# Patient Record
Sex: MALE | Race: WHITE | NOT HISPANIC OR LATINO | Employment: OTHER | URBAN - METROPOLITAN AREA
[De-identification: names, ages, dates, MRNs, and addresses within clinical notes are randomized per-mention and may not be internally consistent; named-entity substitution may affect disease eponyms.]

---

## 2021-04-07 ENCOUNTER — HOSPITAL ENCOUNTER (INPATIENT)
Facility: HOSPITAL | Age: 56
LOS: 2 days | Discharge: HOME | DRG: 392 | End: 2021-04-09
Attending: EMERGENCY MEDICINE | Admitting: STUDENT IN AN ORGANIZED HEALTH CARE EDUCATION/TRAINING PROGRAM

## 2021-04-07 DIAGNOSIS — K57.92 ACUTE DIVERTICULITIS: ICD-10-CM

## 2021-04-07 DIAGNOSIS — K57.20 DIVERTICULITIS OF LARGE INTESTINE WITH ABSCESS WITHOUT BLEEDING: Primary | ICD-10-CM

## 2021-04-07 DIAGNOSIS — R07.9 CHEST PAIN: ICD-10-CM

## 2021-04-07 DIAGNOSIS — R10.31 RIGHT LOWER QUADRANT ABDOMINAL PAIN: ICD-10-CM

## 2021-04-07 LAB
ALBUMIN SERPL BCP-MCNC: 4 G/DL (ref 3.5–5.2)
ALP SERPL-CCNC: 89 U/L (ref 55–135)
ALT SERPL W/O P-5'-P-CCNC: 20 U/L (ref 10–44)
ANION GAP SERPL CALC-SCNC: 10 MMOL/L (ref 8–16)
AST SERPL-CCNC: 13 U/L (ref 10–40)
BASOPHILS # BLD AUTO: 0.03 K/UL (ref 0–0.2)
BASOPHILS NFR BLD: 0.2 % (ref 0–1.9)
BILIRUB SERPL-MCNC: 0.6 MG/DL (ref 0.1–1)
BILIRUB UR QL STRIP: NEGATIVE
BUN SERPL-MCNC: 9 MG/DL (ref 6–20)
CALCIUM SERPL-MCNC: 9.1 MG/DL (ref 8.7–10.5)
CHLORIDE SERPL-SCNC: 100 MMOL/L (ref 95–110)
CLARITY UR: CLEAR
CO2 SERPL-SCNC: 26 MMOL/L (ref 23–29)
COLOR UR: YELLOW
CREAT SERPL-MCNC: 0.8 MG/DL (ref 0.5–1.4)
DIFFERENTIAL METHOD: ABNORMAL
EOSINOPHIL # BLD AUTO: 0.1 K/UL (ref 0–0.5)
EOSINOPHIL NFR BLD: 0.6 % (ref 0–8)
ERYTHROCYTE [DISTWIDTH] IN BLOOD BY AUTOMATED COUNT: 12.3 % (ref 11.5–14.5)
EST. GFR  (AFRICAN AMERICAN): >60 ML/MIN/1.73 M^2
EST. GFR  (NON AFRICAN AMERICAN): >60 ML/MIN/1.73 M^2
GLUCOSE SERPL-MCNC: 132 MG/DL (ref 70–110)
GLUCOSE UR QL STRIP: NEGATIVE
HCT VFR BLD AUTO: 42 % (ref 40–54)
HGB BLD-MCNC: 14.1 G/DL (ref 14–18)
HGB UR QL STRIP: NEGATIVE
IMM GRANULOCYTES # BLD AUTO: 0.05 K/UL (ref 0–0.04)
IMM GRANULOCYTES NFR BLD AUTO: 0.4 % (ref 0–0.5)
KETONES UR QL STRIP: NEGATIVE
LEUKOCYTE ESTERASE UR QL STRIP: NEGATIVE
LIPASE SERPL-CCNC: 5 U/L (ref 4–60)
LYMPHOCYTES # BLD AUTO: 1.4 K/UL (ref 1–4.8)
LYMPHOCYTES NFR BLD: 10.2 % (ref 18–48)
MCH RBC QN AUTO: 31.4 PG (ref 27–31)
MCHC RBC AUTO-ENTMCNC: 33.6 G/DL (ref 32–36)
MCV RBC AUTO: 94 FL (ref 82–98)
MONOCYTES # BLD AUTO: 1 K/UL (ref 0.3–1)
MONOCYTES NFR BLD: 7.3 % (ref 4–15)
NEUTROPHILS # BLD AUTO: 11 K/UL (ref 1.8–7.7)
NEUTROPHILS NFR BLD: 81.3 % (ref 38–73)
NITRITE UR QL STRIP: NEGATIVE
NRBC BLD-RTO: 0 /100 WBC
PH UR STRIP: 7 [PH] (ref 5–8)
PLATELET # BLD AUTO: 214 K/UL (ref 150–450)
PMV BLD AUTO: 9.4 FL (ref 9.2–12.9)
POTASSIUM SERPL-SCNC: 3.8 MMOL/L (ref 3.5–5.1)
PROT SERPL-MCNC: 6.7 G/DL (ref 6–8.4)
PROT UR QL STRIP: NEGATIVE
RBC # BLD AUTO: 4.49 M/UL (ref 4.6–6.2)
SARS-COV-2 RDRP RESP QL NAA+PROBE: NEGATIVE
SODIUM SERPL-SCNC: 136 MMOL/L (ref 136–145)
SP GR UR STRIP: <=1.005 (ref 1–1.03)
URN SPEC COLLECT METH UR: ABNORMAL
UROBILINOGEN UR STRIP-ACNC: NEGATIVE EU/DL
WBC # BLD AUTO: 13.56 K/UL (ref 3.9–12.7)

## 2021-04-07 PROCEDURE — U0002 COVID-19 LAB TEST NON-CDC: HCPCS | Performed by: EMERGENCY MEDICINE

## 2021-04-07 PROCEDURE — 63600175 PHARM REV CODE 636 W HCPCS: Performed by: EMERGENCY MEDICINE

## 2021-04-07 PROCEDURE — 80053 COMPREHEN METABOLIC PANEL: CPT | Performed by: NURSE PRACTITIONER

## 2021-04-07 PROCEDURE — 83690 ASSAY OF LIPASE: CPT | Performed by: NURSE PRACTITIONER

## 2021-04-07 PROCEDURE — 25500020 PHARM REV CODE 255

## 2021-04-07 PROCEDURE — 96365 THER/PROPH/DIAG IV INF INIT: CPT

## 2021-04-07 PROCEDURE — 63600175 PHARM REV CODE 636 W HCPCS: Performed by: STUDENT IN AN ORGANIZED HEALTH CARE EDUCATION/TRAINING PROGRAM

## 2021-04-07 PROCEDURE — 96375 TX/PRO/DX INJ NEW DRUG ADDON: CPT

## 2021-04-07 PROCEDURE — 36415 COLL VENOUS BLD VENIPUNCTURE: CPT | Performed by: EMERGENCY MEDICINE

## 2021-04-07 PROCEDURE — 85025 COMPLETE CBC W/AUTO DIFF WBC: CPT | Performed by: NURSE PRACTITIONER

## 2021-04-07 PROCEDURE — 12000002 HC ACUTE/MED SURGE SEMI-PRIVATE ROOM

## 2021-04-07 PROCEDURE — 96361 HYDRATE IV INFUSION ADD-ON: CPT

## 2021-04-07 PROCEDURE — 87040 BLOOD CULTURE FOR BACTERIA: CPT | Mod: 59 | Performed by: EMERGENCY MEDICINE

## 2021-04-07 PROCEDURE — 99222 1ST HOSP IP/OBS MODERATE 55: CPT | Mod: ,,, | Performed by: SURGERY

## 2021-04-07 PROCEDURE — 99222 PR INITIAL HOSPITAL CARE,LEVL II: ICD-10-PCS | Mod: ,,, | Performed by: SURGERY

## 2021-04-07 PROCEDURE — 36415 COLL VENOUS BLD VENIPUNCTURE: CPT | Performed by: NURSE PRACTITIONER

## 2021-04-07 PROCEDURE — 25000003 PHARM REV CODE 250: Performed by: EMERGENCY MEDICINE

## 2021-04-07 PROCEDURE — 81003 URINALYSIS AUTO W/O SCOPE: CPT | Performed by: NURSE PRACTITIONER

## 2021-04-07 PROCEDURE — 99285 EMERGENCY DEPT VISIT HI MDM: CPT | Mod: 25

## 2021-04-07 RX ORDER — MORPHINE SULFATE 4 MG/ML
4 INJECTION, SOLUTION INTRAMUSCULAR; INTRAVENOUS EVERY 4 HOURS PRN
Status: DISCONTINUED | OUTPATIENT
Start: 2021-04-07 | End: 2021-04-09 | Stop reason: HOSPADM

## 2021-04-07 RX ORDER — IBUPROFEN 200 MG
16 TABLET ORAL
Status: DISCONTINUED | OUTPATIENT
Start: 2021-04-07 | End: 2021-04-09 | Stop reason: HOSPADM

## 2021-04-07 RX ORDER — SODIUM CHLORIDE, SODIUM LACTATE, POTASSIUM CHLORIDE, CALCIUM CHLORIDE 600; 310; 30; 20 MG/100ML; MG/100ML; MG/100ML; MG/100ML
INJECTION, SOLUTION INTRAVENOUS CONTINUOUS
Status: DISCONTINUED | OUTPATIENT
Start: 2021-04-07 | End: 2021-04-09 | Stop reason: HOSPADM

## 2021-04-07 RX ORDER — GLUCAGON 1 MG
1 KIT INJECTION
Status: DISCONTINUED | OUTPATIENT
Start: 2021-04-07 | End: 2021-04-09 | Stop reason: HOSPADM

## 2021-04-07 RX ORDER — SODIUM CHLORIDE 0.9 % (FLUSH) 0.9 %
10 SYRINGE (ML) INJECTION
Status: DISCONTINUED | OUTPATIENT
Start: 2021-04-07 | End: 2021-04-09 | Stop reason: HOSPADM

## 2021-04-07 RX ORDER — HYDROMORPHONE HYDROCHLORIDE 2 MG/ML
1 INJECTION, SOLUTION INTRAMUSCULAR; INTRAVENOUS; SUBCUTANEOUS
Status: COMPLETED | OUTPATIENT
Start: 2021-04-07 | End: 2021-04-07

## 2021-04-07 RX ORDER — IBUPROFEN 200 MG
24 TABLET ORAL
Status: DISCONTINUED | OUTPATIENT
Start: 2021-04-07 | End: 2021-04-09 | Stop reason: HOSPADM

## 2021-04-07 RX ORDER — ONDANSETRON 2 MG/ML
4 INJECTION INTRAMUSCULAR; INTRAVENOUS EVERY 8 HOURS PRN
Status: DISCONTINUED | OUTPATIENT
Start: 2021-04-07 | End: 2021-04-09 | Stop reason: HOSPADM

## 2021-04-07 RX ORDER — HYDROMORPHONE HYDROCHLORIDE 1 MG/ML
1 INJECTION, SOLUTION INTRAMUSCULAR; INTRAVENOUS; SUBCUTANEOUS EVERY 6 HOURS PRN
Status: DISCONTINUED | OUTPATIENT
Start: 2021-04-07 | End: 2021-04-09 | Stop reason: HOSPADM

## 2021-04-07 RX ORDER — MORPHINE SULFATE 4 MG/ML
4 INJECTION, SOLUTION INTRAMUSCULAR; INTRAVENOUS
Status: COMPLETED | OUTPATIENT
Start: 2021-04-07 | End: 2021-04-07

## 2021-04-07 RX ADMIN — MORPHINE SULFATE 4 MG: 4 INJECTION, SOLUTION INTRAMUSCULAR; INTRAVENOUS at 03:04

## 2021-04-07 RX ADMIN — IOHEXOL 100 ML: 350 INJECTION, SOLUTION INTRAVENOUS at 02:04

## 2021-04-07 RX ADMIN — SODIUM CHLORIDE, SODIUM LACTATE, POTASSIUM CHLORIDE, AND CALCIUM CHLORIDE: .6; .31; .03; .02 INJECTION, SOLUTION INTRAVENOUS at 08:04

## 2021-04-07 RX ADMIN — SODIUM CHLORIDE 1000 ML: 0.9 INJECTION, SOLUTION INTRAVENOUS at 03:04

## 2021-04-07 RX ADMIN — MORPHINE SULFATE 4 MG: 4 INJECTION, SOLUTION INTRAMUSCULAR; INTRAVENOUS at 09:04

## 2021-04-07 RX ADMIN — HYDROMORPHONE HYDROCHLORIDE 1 MG: 2 INJECTION INTRAMUSCULAR; INTRAVENOUS; SUBCUTANEOUS at 05:04

## 2021-04-07 RX ADMIN — PIPERACILLIN AND TAZOBACTAM 4.5 G: 4; .5 INJECTION, POWDER, LYOPHILIZED, FOR SOLUTION INTRAVENOUS; PARENTERAL at 05:04

## 2021-04-08 LAB
ALBUMIN SERPL BCP-MCNC: 3.4 G/DL (ref 3.5–5.2)
ALP SERPL-CCNC: 87 U/L (ref 55–135)
ALT SERPL W/O P-5'-P-CCNC: 18 U/L (ref 10–44)
ANION GAP SERPL CALC-SCNC: 9 MMOL/L (ref 8–16)
AST SERPL-CCNC: 12 U/L (ref 10–40)
BASOPHILS # BLD AUTO: 0.03 K/UL (ref 0–0.2)
BASOPHILS NFR BLD: 0.3 % (ref 0–1.9)
BILIRUB SERPL-MCNC: 0.7 MG/DL (ref 0.1–1)
BUN SERPL-MCNC: 8 MG/DL (ref 6–20)
CALCIUM SERPL-MCNC: 8.7 MG/DL (ref 8.7–10.5)
CHLORIDE SERPL-SCNC: 106 MMOL/L (ref 95–110)
CO2 SERPL-SCNC: 25 MMOL/L (ref 23–29)
CREAT SERPL-MCNC: 0.8 MG/DL (ref 0.5–1.4)
DIFFERENTIAL METHOD: ABNORMAL
EOSINOPHIL # BLD AUTO: 0.1 K/UL (ref 0–0.5)
EOSINOPHIL NFR BLD: 0.9 % (ref 0–8)
ERYTHROCYTE [DISTWIDTH] IN BLOOD BY AUTOMATED COUNT: 12.5 % (ref 11.5–14.5)
EST. GFR  (AFRICAN AMERICAN): >60 ML/MIN/1.73 M^2
EST. GFR  (NON AFRICAN AMERICAN): >60 ML/MIN/1.73 M^2
GLUCOSE SERPL-MCNC: 97 MG/DL (ref 70–110)
HCT VFR BLD AUTO: 39.7 % (ref 40–54)
HGB BLD-MCNC: 13.1 G/DL (ref 14–18)
IMM GRANULOCYTES # BLD AUTO: 0.04 K/UL (ref 0–0.04)
IMM GRANULOCYTES NFR BLD AUTO: 0.4 % (ref 0–0.5)
LYMPHOCYTES # BLD AUTO: 1 K/UL (ref 1–4.8)
LYMPHOCYTES NFR BLD: 9.7 % (ref 18–48)
MAGNESIUM SERPL-MCNC: 2 MG/DL (ref 1.6–2.6)
MCH RBC QN AUTO: 31.2 PG (ref 27–31)
MCHC RBC AUTO-ENTMCNC: 33 G/DL (ref 32–36)
MCV RBC AUTO: 95 FL (ref 82–98)
MONOCYTES # BLD AUTO: 0.9 K/UL (ref 0.3–1)
MONOCYTES NFR BLD: 8.6 % (ref 4–15)
NEUTROPHILS # BLD AUTO: 8.3 K/UL (ref 1.8–7.7)
NEUTROPHILS NFR BLD: 80.1 % (ref 38–73)
NRBC BLD-RTO: 0 /100 WBC
PLATELET # BLD AUTO: 209 K/UL (ref 150–450)
PMV BLD AUTO: 10.2 FL (ref 9.2–12.9)
POTASSIUM SERPL-SCNC: 3.9 MMOL/L (ref 3.5–5.1)
PROT SERPL-MCNC: 6.1 G/DL (ref 6–8.4)
RBC # BLD AUTO: 4.2 M/UL (ref 4.6–6.2)
SODIUM SERPL-SCNC: 140 MMOL/L (ref 136–145)
WBC # BLD AUTO: 10.38 K/UL (ref 3.9–12.7)

## 2021-04-08 PROCEDURE — 85025 COMPLETE CBC W/AUTO DIFF WBC: CPT | Performed by: STUDENT IN AN ORGANIZED HEALTH CARE EDUCATION/TRAINING PROGRAM

## 2021-04-08 PROCEDURE — 63600175 PHARM REV CODE 636 W HCPCS: Performed by: STUDENT IN AN ORGANIZED HEALTH CARE EDUCATION/TRAINING PROGRAM

## 2021-04-08 PROCEDURE — 83735 ASSAY OF MAGNESIUM: CPT | Performed by: STUDENT IN AN ORGANIZED HEALTH CARE EDUCATION/TRAINING PROGRAM

## 2021-04-08 PROCEDURE — 25000003 PHARM REV CODE 250: Performed by: STUDENT IN AN ORGANIZED HEALTH CARE EDUCATION/TRAINING PROGRAM

## 2021-04-08 PROCEDURE — 80053 COMPREHEN METABOLIC PANEL: CPT | Performed by: STUDENT IN AN ORGANIZED HEALTH CARE EDUCATION/TRAINING PROGRAM

## 2021-04-08 PROCEDURE — 12000002 HC ACUTE/MED SURGE SEMI-PRIVATE ROOM

## 2021-04-08 PROCEDURE — 36415 COLL VENOUS BLD VENIPUNCTURE: CPT | Performed by: STUDENT IN AN ORGANIZED HEALTH CARE EDUCATION/TRAINING PROGRAM

## 2021-04-08 RX ADMIN — HYDROMORPHONE HYDROCHLORIDE 1 MG: 1 INJECTION, SOLUTION INTRAMUSCULAR; INTRAVENOUS; SUBCUTANEOUS at 08:04

## 2021-04-08 RX ADMIN — HYDROMORPHONE HYDROCHLORIDE 1 MG: 1 INJECTION, SOLUTION INTRAMUSCULAR; INTRAVENOUS; SUBCUTANEOUS at 02:04

## 2021-04-08 RX ADMIN — PIPERACILLIN AND TAZOBACTAM 4.5 G: 4; .5 INJECTION, POWDER, LYOPHILIZED, FOR SOLUTION INTRAVENOUS; PARENTERAL at 05:04

## 2021-04-08 RX ADMIN — PIPERACILLIN AND TAZOBACTAM 4.5 G: 4; .5 INJECTION, POWDER, LYOPHILIZED, FOR SOLUTION INTRAVENOUS; PARENTERAL at 09:04

## 2021-04-08 RX ADMIN — PIPERACILLIN AND TAZOBACTAM 4.5 G: 4; .5 INJECTION, POWDER, LYOPHILIZED, FOR SOLUTION INTRAVENOUS; PARENTERAL at 02:04

## 2021-04-09 VITALS
HEIGHT: 72 IN | TEMPERATURE: 99 F | HEART RATE: 56 BPM | SYSTOLIC BLOOD PRESSURE: 114 MMHG | DIASTOLIC BLOOD PRESSURE: 66 MMHG | BODY MASS INDEX: 27.17 KG/M2 | RESPIRATION RATE: 16 BRPM | WEIGHT: 200.63 LBS | OXYGEN SATURATION: 97 %

## 2021-04-09 PROBLEM — R10.31 RIGHT LOWER QUADRANT ABDOMINAL PAIN: Status: RESOLVED | Noted: 2021-04-07 | Resolved: 2021-04-09

## 2021-04-09 PROBLEM — K57.20 DIVERTICULITIS OF LARGE INTESTINE WITH ABSCESS WITHOUT BLEEDING: Status: ACTIVE | Noted: 2021-04-07

## 2021-04-09 LAB
ALBUMIN SERPL BCP-MCNC: 3 G/DL (ref 3.5–5.2)
ALP SERPL-CCNC: 71 U/L (ref 55–135)
ALT SERPL W/O P-5'-P-CCNC: 15 U/L (ref 10–44)
ANION GAP SERPL CALC-SCNC: 9 MMOL/L (ref 8–16)
AST SERPL-CCNC: 8 U/L (ref 10–40)
BASOPHILS # BLD AUTO: 0.03 K/UL (ref 0–0.2)
BASOPHILS NFR BLD: 0.3 % (ref 0–1.9)
BILIRUB SERPL-MCNC: 0.5 MG/DL (ref 0.1–1)
BUN SERPL-MCNC: 8 MG/DL (ref 6–20)
CALCIUM SERPL-MCNC: 8.6 MG/DL (ref 8.7–10.5)
CHLORIDE SERPL-SCNC: 107 MMOL/L (ref 95–110)
CO2 SERPL-SCNC: 25 MMOL/L (ref 23–29)
CREAT SERPL-MCNC: 0.8 MG/DL (ref 0.5–1.4)
DIFFERENTIAL METHOD: ABNORMAL
EOSINOPHIL # BLD AUTO: 0.2 K/UL (ref 0–0.5)
EOSINOPHIL NFR BLD: 1.9 % (ref 0–8)
ERYTHROCYTE [DISTWIDTH] IN BLOOD BY AUTOMATED COUNT: 12.4 % (ref 11.5–14.5)
EST. GFR  (AFRICAN AMERICAN): >60 ML/MIN/1.73 M^2
EST. GFR  (NON AFRICAN AMERICAN): >60 ML/MIN/1.73 M^2
GLUCOSE SERPL-MCNC: 135 MG/DL (ref 70–110)
HCT VFR BLD AUTO: 38.3 % (ref 40–54)
HGB BLD-MCNC: 12.4 G/DL (ref 14–18)
IMM GRANULOCYTES # BLD AUTO: 0.06 K/UL (ref 0–0.04)
IMM GRANULOCYTES NFR BLD AUTO: 0.6 % (ref 0–0.5)
LYMPHOCYTES # BLD AUTO: 1 K/UL (ref 1–4.8)
LYMPHOCYTES NFR BLD: 10.4 % (ref 18–48)
MAGNESIUM SERPL-MCNC: 1.8 MG/DL (ref 1.6–2.6)
MCH RBC QN AUTO: 31.1 PG (ref 27–31)
MCHC RBC AUTO-ENTMCNC: 32.4 G/DL (ref 32–36)
MCV RBC AUTO: 96 FL (ref 82–98)
MONOCYTES # BLD AUTO: 0.8 K/UL (ref 0.3–1)
MONOCYTES NFR BLD: 7.9 % (ref 4–15)
NEUTROPHILS # BLD AUTO: 7.8 K/UL (ref 1.8–7.7)
NEUTROPHILS NFR BLD: 78.9 % (ref 38–73)
NRBC BLD-RTO: 0 /100 WBC
PLATELET # BLD AUTO: 205 K/UL (ref 150–450)
PMV BLD AUTO: 10 FL (ref 9.2–12.9)
POTASSIUM SERPL-SCNC: 3.7 MMOL/L (ref 3.5–5.1)
PROT SERPL-MCNC: 5.9 G/DL (ref 6–8.4)
RBC # BLD AUTO: 3.99 M/UL (ref 4.6–6.2)
SODIUM SERPL-SCNC: 141 MMOL/L (ref 136–145)
WBC # BLD AUTO: 9.86 K/UL (ref 3.9–12.7)

## 2021-04-09 PROCEDURE — 36415 COLL VENOUS BLD VENIPUNCTURE: CPT | Performed by: STUDENT IN AN ORGANIZED HEALTH CARE EDUCATION/TRAINING PROGRAM

## 2021-04-09 PROCEDURE — 25000003 PHARM REV CODE 250: Performed by: NURSE PRACTITIONER

## 2021-04-09 PROCEDURE — 99232 PR SUBSEQUENT HOSPITAL CARE,LEVL II: ICD-10-PCS | Mod: ,,, | Performed by: PHYSICIAN ASSISTANT

## 2021-04-09 PROCEDURE — 80053 COMPREHEN METABOLIC PANEL: CPT | Performed by: STUDENT IN AN ORGANIZED HEALTH CARE EDUCATION/TRAINING PROGRAM

## 2021-04-09 PROCEDURE — 63600175 PHARM REV CODE 636 W HCPCS: Performed by: STUDENT IN AN ORGANIZED HEALTH CARE EDUCATION/TRAINING PROGRAM

## 2021-04-09 PROCEDURE — 99232 SBSQ HOSP IP/OBS MODERATE 35: CPT | Mod: ,,, | Performed by: PHYSICIAN ASSISTANT

## 2021-04-09 PROCEDURE — 25000003 PHARM REV CODE 250: Performed by: STUDENT IN AN ORGANIZED HEALTH CARE EDUCATION/TRAINING PROGRAM

## 2021-04-09 PROCEDURE — 85025 COMPLETE CBC W/AUTO DIFF WBC: CPT | Performed by: STUDENT IN AN ORGANIZED HEALTH CARE EDUCATION/TRAINING PROGRAM

## 2021-04-09 PROCEDURE — 83735 ASSAY OF MAGNESIUM: CPT | Performed by: STUDENT IN AN ORGANIZED HEALTH CARE EDUCATION/TRAINING PROGRAM

## 2021-04-09 RX ORDER — SIMETHICONE 80 MG
2 TABLET,CHEWABLE ORAL 3 TIMES DAILY PRN
Status: DISCONTINUED | OUTPATIENT
Start: 2021-04-09 | End: 2021-04-09 | Stop reason: HOSPADM

## 2021-04-09 RX ORDER — CIPROFLOXACIN 500 MG/1
500 TABLET ORAL EVERY 12 HOURS
Qty: 10 TABLET | Refills: 0 | Status: SHIPPED | OUTPATIENT
Start: 2021-04-09 | End: 2023-01-13

## 2021-04-09 RX ORDER — METRONIDAZOLE 500 MG/1
500 TABLET ORAL EVERY 6 HOURS
Qty: 20 TABLET | Refills: 0 | Status: SHIPPED | OUTPATIENT
Start: 2021-04-09 | End: 2023-01-13

## 2021-04-09 RX ORDER — SIMETHICONE 80 MG
160 TABLET,CHEWABLE ORAL 3 TIMES DAILY PRN
Qty: 30 TABLET | Refills: 0 | Status: SHIPPED | OUTPATIENT
Start: 2021-04-09 | End: 2023-01-13

## 2021-04-09 RX ADMIN — PIPERACILLIN AND TAZOBACTAM 4.5 G: 4; .5 INJECTION, POWDER, LYOPHILIZED, FOR SOLUTION INTRAVENOUS; PARENTERAL at 10:04

## 2021-04-09 RX ADMIN — SIMETHICONE 160 MG: 80 TABLET, CHEWABLE ORAL at 02:04

## 2021-04-09 RX ADMIN — HYDROMORPHONE HYDROCHLORIDE 1 MG: 1 INJECTION, SOLUTION INTRAMUSCULAR; INTRAVENOUS; SUBCUTANEOUS at 08:04

## 2021-04-09 RX ADMIN — PIPERACILLIN AND TAZOBACTAM 4.5 G: 4; .5 INJECTION, POWDER, LYOPHILIZED, FOR SOLUTION INTRAVENOUS; PARENTERAL at 01:04

## 2021-04-12 LAB
BACTERIA BLD CULT: NORMAL
BACTERIA BLD CULT: NORMAL

## 2023-01-12 ENCOUNTER — OFFICE VISIT (OUTPATIENT)
Dept: FAMILY MEDICINE | Facility: CLINIC | Age: 58
End: 2023-01-12
Payer: COMMERCIAL

## 2023-01-12 VITALS
HEART RATE: 60 BPM | WEIGHT: 208.69 LBS | RESPIRATION RATE: 18 BRPM | BODY MASS INDEX: 28.27 KG/M2 | HEIGHT: 72 IN | OXYGEN SATURATION: 95 % | TEMPERATURE: 98 F

## 2023-01-12 DIAGNOSIS — Z13.1 SCREENING FOR DIABETES MELLITUS: ICD-10-CM

## 2023-01-12 DIAGNOSIS — M25.50 ARTHRALGIA, UNSPECIFIED JOINT: ICD-10-CM

## 2023-01-12 DIAGNOSIS — R03.0 ELEVATED BLOOD PRESSURE READING: ICD-10-CM

## 2023-01-12 DIAGNOSIS — R53.83 FATIGUE, UNSPECIFIED TYPE: ICD-10-CM

## 2023-01-12 DIAGNOSIS — Z12.5 SCREENING FOR PROSTATE CANCER: ICD-10-CM

## 2023-01-12 DIAGNOSIS — Z12.11 SCREENING FOR COLON CANCER: ICD-10-CM

## 2023-01-12 DIAGNOSIS — Z00.00 ANNUAL PHYSICAL EXAM: Primary | ICD-10-CM

## 2023-01-12 DIAGNOSIS — M54.40 LOW BACK PAIN WITH SCIATICA, SCIATICA LATERALITY UNSPECIFIED, UNSPECIFIED BACK PAIN LATERALITY, UNSPECIFIED CHRONICITY: ICD-10-CM

## 2023-01-12 DIAGNOSIS — Z13.220 SCREENING FOR HYPERLIPIDEMIA: ICD-10-CM

## 2023-01-12 PROCEDURE — 3008F BODY MASS INDEX DOCD: CPT | Mod: CPTII,S$GLB,,

## 2023-01-12 PROCEDURE — 1160F RVW MEDS BY RX/DR IN RCRD: CPT | Mod: CPTII,S$GLB,,

## 2023-01-12 PROCEDURE — 1159F PR MEDICATION LIST DOCUMENTED IN MEDICAL RECORD: ICD-10-PCS | Mod: CPTII,S$GLB,,

## 2023-01-12 PROCEDURE — 3008F PR BODY MASS INDEX (BMI) DOCUMENTED: ICD-10-PCS | Mod: CPTII,S$GLB,,

## 2023-01-12 PROCEDURE — 3044F PR MOST RECENT HEMOGLOBIN A1C LEVEL <7.0%: ICD-10-PCS | Mod: CPTII,S$GLB,,

## 2023-01-12 PROCEDURE — 99386 PREV VISIT NEW AGE 40-64: CPT | Mod: S$GLB,,,

## 2023-01-12 PROCEDURE — 1159F MED LIST DOCD IN RCRD: CPT | Mod: CPTII,S$GLB,,

## 2023-01-12 PROCEDURE — 3044F HG A1C LEVEL LT 7.0%: CPT | Mod: CPTII,S$GLB,,

## 2023-01-12 PROCEDURE — 99386 PR PREVENTIVE VISIT,NEW,40-64: ICD-10-PCS | Mod: S$GLB,,,

## 2023-01-12 PROCEDURE — 1160F PR REVIEW ALL MEDS BY PRESCRIBER/CLIN PHARMACIST DOCUMENTED: ICD-10-PCS | Mod: CPTII,S$GLB,,

## 2023-01-12 NOTE — PROGRESS NOTES
"Subjective:       Patient ID: Jerod Barrientos is a 57 y.o. male.    Chief Complaint: Establish Care    New patient presents to clinic to establish care.  He complains of muscle spasms in his right arm.  He also has frequent "yvonne horses" in his right leg.  He had an issue in his right leg with a yvonne horse he felt something pop in his leg and he developed a bruise in his thigh and behind his knee. This has since resolved.  He does continue to get cramps in his legs at night.  Cramping is happening in both leg with right greater than left.  He has lower back pain that does occasionally radiate down his legs. He also complains of dry patches of skin on his elbows that comes and goes. He states he has pain in his joints when this happens. He is concerned he has psoriatic arthritis.    PMHx  Diverticulitis    Surgical History  Neck surgery    No known allergies    Family History  Mother () lung cancer  Father () alcohol abuse  Maternal Grandfather () throat cancer    Social History    Tobacco: smokes cigarettes occasionally  ETOH: never  Career: self employed in construction      Review of patient's allergies indicates:  No Known Allergies  Social Determinants of Health     Tobacco Use: High Risk    Smoking Tobacco Use: Some Days    Smokeless Tobacco Use: Never    Passive Exposure: Not on file   Alcohol Use: Not on file   Financial Resource Strain: Not on file   Food Insecurity: Not on file   Transportation Needs: Not on file   Physical Activity: Not on file   Stress: Not on file   Social Connections: Not on file   Housing Stability: Not on file   Depression: Low Risk     Last PHQ Score: 0      History reviewed. No pertinent past medical history.   Past Surgical History:   Procedure Laterality Date    NECK SURGERY        Social History     Socioeconomic History    Marital status:     Number of children: 2         Current Outpatient Medications:     ciprofloxacin HCl " (CIPRO) 500 MG tablet, Take 1 tablet (500 mg total) by mouth every 12 (twelve) hours., Disp: 10 tablet, Rfl: 0    metroNIDAZOLE (FLAGYL) 500 MG tablet, Take 1 tablet (500 mg total) by mouth every 6 (six) hours., Disp: 20 tablet, Rfl: 0    simethicone (MYLICON) 80 MG chewable tablet, Take 2 tablets (160 mg total) by mouth 3 (three) times daily as needed for Flatulence., Disp: 30 tablet, Rfl: 0    Lab Results   Component Value Date    WBC 5.90 01/13/2023    HGB 15.6 01/13/2023    HCT 47.3 01/13/2023     01/13/2023    CHOL 213 (H) 01/13/2023    TRIG 126 01/13/2023    HDL 40 01/13/2023    ALT 21 01/13/2023    AST 15 01/13/2023     01/13/2023    K 4.2 01/13/2023     01/13/2023    CREATININE 0.8 01/13/2023    BUN 10 01/13/2023    CO2 26 01/13/2023    TSH 1.540 01/13/2023    PSA 0.23 01/13/2023    HGBA1C 5.0 01/13/2023       Review of Systems   Constitutional:  Negative for chills and fever.   Respiratory:  Negative for shortness of breath.    Cardiovascular:  Negative for chest pain.   Musculoskeletal:  Positive for arthralgias, back pain, leg pain and myalgias.   Integumentary:  Positive for rash.   Neurological: Negative.    Psychiatric/Behavioral: Negative.       Objective:      Physical Exam  Vitals reviewed.   Constitutional:       Appearance: Normal appearance.   HENT:      Head: Normocephalic and atraumatic.      Right Ear: Tympanic membrane normal.      Left Ear: Tympanic membrane normal.      Nose: Nose normal.      Mouth/Throat:      Mouth: Mucous membranes are moist.   Eyes:      Conjunctiva/sclera: Conjunctivae normal.      Pupils: Pupils are equal, round, and reactive to light.   Cardiovascular:      Rate and Rhythm: Normal rate and regular rhythm.      Pulses: Normal pulses.           Radial pulses are 2+ on the right side and 2+ on the left side.        Dorsalis pedis pulses are 2+ on the right side and 2+ on the left side.      Heart sounds: Normal heart sounds, S1 normal and S2 normal.    Pulmonary:      Effort: Pulmonary effort is normal.      Breath sounds: Normal breath sounds.   Abdominal:      General: Abdomen is flat. Bowel sounds are normal.      Palpations: Abdomen is soft.      Tenderness: There is no abdominal tenderness.   Musculoskeletal:         General: Normal range of motion.      Lumbar back: Normal.      Right lower leg: Normal. No tenderness.      Left lower leg: Normal. No tenderness.   Skin:     General: Skin is warm and dry.      Capillary Refill: Capillary refill takes less than 2 seconds.      Comments: Dry scaly patches to elbows.     Neurological:      Mental Status: He is alert and oriented to person, place, and time.   Psychiatric:         Mood and Affect: Mood normal.         Behavior: Behavior normal.         Thought Content: Thought content normal.         Judgment: Judgment normal.       Assessment:       1. Annual physical exam    2. Low back pain with sciatica, sciatica laterality unspecified, unspecified back pain laterality, unspecified chronicity    3. Arthralgia, unspecified joint    4. Elevated blood pressure reading    5. Fatigue, unspecified type    6. Screening for diabetes mellitus    7. Screening for hyperlipidemia    8. Screening for prostate cancer    9. Screening for colon cancer          Plan:       Jerod was seen today for [facility] care.    Diagnoses and all orders for this visit:    Annual physical exam  -     CBC Auto Differential; Future  -     Comprehensive Metabolic Panel; Future  -     Hepatitis C Antibody; Future  -     HIV 1/2 Ag/Ab (4th Gen); Future    Low back pain with sciatica, sciatica laterality unspecified, unspecified back pain laterality, unspecified chronicity  -     X-Ray Lumbar Spine AP And Lateral; Future    Arthralgia, unspecified joint  -     C-REACTIVE PROTEIN; Future  -     Sedimentation rate; Future    Elevated blood pressure reading    Fatigue, unspecified type  -     TSH; Future    Screening for diabetes mellitus  -      Hemoglobin A1C; Future    Screening for hyperlipidemia  -     Lipid Panel; Future    Screening for prostate cancer  -     PSA, Screening; Future    Screening for colon cancer  -     Case Request Endoscopy: COLONOSCOPY     Have fasting labs done.  X-ray of lumbar spine to assess lower back pain.  Possible referral to Orthopedics.   Will check inflammatory markers for joint pain.  Elevated B/P reading today.  Patient's states he doesn't usually have high blood pressure. Patient to monitor blood pressures at home and keep a log then return to clinic in 2 weeks for recheck  Colonoscopy ordered  PSA testing discussed with patient  The meaning of a false positive PSA and a false negative PSA has been discussed. He indicates understanding of the limitations of this screening test and wishes  to proceed with screening PSA testing.

## 2023-01-13 ENCOUNTER — HOSPITAL ENCOUNTER (OUTPATIENT)
Dept: RADIOLOGY | Facility: HOSPITAL | Age: 58
Discharge: HOME | End: 2023-01-13
Payer: COMMERCIAL

## 2023-01-13 ENCOUNTER — TELEPHONE (OUTPATIENT)
Dept: GASTROENTEROLOGY | Facility: CLINIC | Age: 58
End: 2023-01-13
Payer: COMMERCIAL

## 2023-01-13 ENCOUNTER — PATIENT MESSAGE (OUTPATIENT)
Dept: GASTROENTEROLOGY | Facility: CLINIC | Age: 58
End: 2023-01-13
Payer: COMMERCIAL

## 2023-01-13 DIAGNOSIS — M54.40 LOW BACK PAIN WITH SCIATICA, SCIATICA LATERALITY UNSPECIFIED, UNSPECIFIED BACK PAIN LATERALITY, UNSPECIFIED CHRONICITY: ICD-10-CM

## 2023-01-13 DIAGNOSIS — M54.40 LOW BACK PAIN WITH SCIATICA, SCIATICA LATERALITY UNSPECIFIED, UNSPECIFIED BACK PAIN LATERALITY, UNSPECIFIED CHRONICITY: Primary | ICD-10-CM

## 2023-01-13 PROCEDURE — 72100 X-RAY EXAM L-S SPINE 2/3 VWS: CPT | Mod: 26,,, | Performed by: RADIOLOGY

## 2023-01-13 PROCEDURE — 72100 XR LUMBAR SPINE AP AND LATERAL: ICD-10-PCS | Mod: 26,,, | Performed by: RADIOLOGY

## 2023-01-13 PROCEDURE — 72100 X-RAY EXAM L-S SPINE 2/3 VWS: CPT | Mod: TC,FY

## 2023-01-16 ENCOUNTER — PATIENT MESSAGE (OUTPATIENT)
Dept: FAMILY MEDICINE | Facility: CLINIC | Age: 58
End: 2023-01-16
Payer: COMMERCIAL

## 2023-01-17 ENCOUNTER — PATIENT MESSAGE (OUTPATIENT)
Dept: GASTROENTEROLOGY | Facility: CLINIC | Age: 58
End: 2023-01-17
Payer: COMMERCIAL

## 2023-01-26 ENCOUNTER — OFFICE VISIT (OUTPATIENT)
Dept: FAMILY MEDICINE | Facility: CLINIC | Age: 58
End: 2023-01-26
Payer: COMMERCIAL

## 2023-01-26 ENCOUNTER — PATIENT MESSAGE (OUTPATIENT)
Dept: FAMILY MEDICINE | Facility: CLINIC | Age: 58
End: 2023-01-26

## 2023-01-26 VITALS
OXYGEN SATURATION: 99 % | TEMPERATURE: 98 F | WEIGHT: 205.38 LBS | HEIGHT: 72 IN | RESPIRATION RATE: 18 BRPM | HEART RATE: 71 BPM | BODY MASS INDEX: 27.82 KG/M2

## 2023-01-26 DIAGNOSIS — F41.9 ANXIETY: ICD-10-CM

## 2023-01-26 DIAGNOSIS — I10 ESSENTIAL HYPERTENSION: Primary | ICD-10-CM

## 2023-01-26 PROCEDURE — 99214 OFFICE O/P EST MOD 30 MIN: CPT | Mod: S$GLB,,,

## 2023-01-26 PROCEDURE — 1159F PR MEDICATION LIST DOCUMENTED IN MEDICAL RECORD: ICD-10-PCS | Mod: CPTII,S$GLB,,

## 2023-01-26 PROCEDURE — 3008F BODY MASS INDEX DOCD: CPT | Mod: CPTII,S$GLB,,

## 2023-01-26 PROCEDURE — 4010F PR ACE/ARB THEARPY RXD/TAKEN: ICD-10-PCS | Mod: CPTII,S$GLB,,

## 2023-01-26 PROCEDURE — 1160F RVW MEDS BY RX/DR IN RCRD: CPT | Mod: CPTII,S$GLB,,

## 2023-01-26 PROCEDURE — 3044F HG A1C LEVEL LT 7.0%: CPT | Mod: CPTII,S$GLB,,

## 2023-01-26 PROCEDURE — 1160F PR REVIEW ALL MEDS BY PRESCRIBER/CLIN PHARMACIST DOCUMENTED: ICD-10-PCS | Mod: CPTII,S$GLB,,

## 2023-01-26 PROCEDURE — 99214 PR OFFICE/OUTPT VISIT, EST, LEVL IV, 30-39 MIN: ICD-10-PCS | Mod: S$GLB,,,

## 2023-01-26 PROCEDURE — 3044F PR MOST RECENT HEMOGLOBIN A1C LEVEL <7.0%: ICD-10-PCS | Mod: CPTII,S$GLB,,

## 2023-01-26 PROCEDURE — 4010F ACE/ARB THERAPY RXD/TAKEN: CPT | Mod: CPTII,S$GLB,,

## 2023-01-26 PROCEDURE — 1159F MED LIST DOCD IN RCRD: CPT | Mod: CPTII,S$GLB,,

## 2023-01-26 PROCEDURE — 3008F PR BODY MASS INDEX (BMI) DOCUMENTED: ICD-10-PCS | Mod: CPTII,S$GLB,,

## 2023-01-26 RX ORDER — LOSARTAN POTASSIUM 25 MG/1
25 TABLET ORAL DAILY
Qty: 30 TABLET | Refills: 1 | Status: SHIPPED | OUTPATIENT
Start: 2023-01-26 | End: 2023-02-14 | Stop reason: DRUGHIGH

## 2023-01-26 RX ORDER — ESCITALOPRAM OXALATE 5 MG/1
5 TABLET ORAL DAILY
Qty: 30 TABLET | Refills: 1 | Status: SHIPPED | OUTPATIENT
Start: 2023-01-26 | End: 2023-03-23

## 2023-01-26 RX ORDER — BUSPIRONE HYDROCHLORIDE 7.5 MG/1
7.5 TABLET ORAL 3 TIMES DAILY
Qty: 90 TABLET | Refills: 1 | Status: SHIPPED | OUTPATIENT
Start: 2023-01-26 | End: 2023-03-23

## 2023-01-26 NOTE — PROGRESS NOTES
Subjective:       Patient ID: Jerod Barrientos is a 57 y.o. male.    Chief Complaint: Follow-up (2 week)    Patient presents to clinic for blood pressure follow up.  Blood pressures at home have been 151/80, 156/80, and 131/82.  Blood pressure in clinic today is elevated at 146/90.  He denies chest pain and shortness of breath.    Has been feeling stressed due to a problem with a neighbor which has led to a law suit.  He feels very overwhelmed. He states he has been at odds with his neighbor who often calls the police on him for no reason. He is feeling on edge all the time.  He denies thoughts of suicide.                  Review of patient's allergies indicates:  No Known Allergies  Social Determinants of Health     Tobacco Use: High Risk    Smoking Tobacco Use: Some Days    Smokeless Tobacco Use: Never    Passive Exposure: Not on file   Alcohol Use: Not on file   Financial Resource Strain: Not on file   Food Insecurity: Not on file   Transportation Needs: Not on file   Physical Activity: Not on file   Stress: Not on file   Social Connections: Not on file   Housing Stability: Not on file   Depression: Low Risk     Last PHQ Score: 0      No past medical history on file.   Past Surgical History:   Procedure Laterality Date    NECK SURGERY        Social History     Socioeconomic History    Marital status:     Number of children: 2         Current Outpatient Medications:     busPIRone (BUSPAR) 7.5 MG tablet, Take 1 tablet (7.5 mg total) by mouth 3 (three) times daily., Disp: 90 tablet, Rfl: 1    EScitalopram oxalate (LEXAPRO) 5 MG Tab, Take 1 tablet (5 mg total) by mouth once daily., Disp: 30 tablet, Rfl: 1    losartan (COZAAR) 25 MG tablet, Take 1 tablet (25 mg total) by mouth once daily., Disp: 30 tablet, Rfl: 1    Lab Results   Component Value Date    WBC 5.90 01/13/2023    HGB 15.6 01/13/2023    HCT 47.3 01/13/2023     01/13/2023    CHOL 213 (H) 01/13/2023    TRIG 126 01/13/2023    HDL 40  01/13/2023    ALT 21 01/13/2023    AST 15 01/13/2023     01/13/2023    K 4.2 01/13/2023     01/13/2023    CREATININE 0.8 01/13/2023    BUN 10 01/13/2023    CO2 26 01/13/2023    TSH 1.540 01/13/2023    PSA 0.23 01/13/2023    HGBA1C 5.0 01/13/2023       Review of Systems   Constitutional:  Negative for chills and fever.   Respiratory:  Negative for shortness of breath.    Cardiovascular:  Negative for chest pain and palpitations.   Psychiatric/Behavioral:  Negative for suicidal ideas. The patient is nervous/anxious.      Objective:      Physical Exam  Vitals reviewed.   Constitutional:       Appearance: Normal appearance.   Cardiovascular:      Rate and Rhythm: Normal rate and regular rhythm.      Pulses: Normal pulses.           Radial pulses are 2+ on the right side and 2+ on the left side.        Dorsalis pedis pulses are 2+ on the right side and 2+ on the left side.      Heart sounds: Normal heart sounds, S1 normal and S2 normal.   Pulmonary:      Effort: Pulmonary effort is normal.      Breath sounds: Normal breath sounds.   Musculoskeletal:      Right lower leg: No edema.      Left lower leg: No edema.   Skin:     General: Skin is warm and dry.   Neurological:      Mental Status: He is alert and oriented to person, place, and time.   Psychiatric:         Attention and Perception: Attention normal.         Mood and Affect: Mood and affect normal.         Speech: Speech normal.         Behavior: Behavior normal. Behavior is cooperative.         Thought Content: Thought content normal. Thought content does not include homicidal or suicidal ideation.         Judgment: Judgment normal.       Assessment:       1. Essential hypertension    2. Anxiety        Plan:       Jerod was seen today for follow-up.    Diagnoses and all orders for this visit:    Essential hypertension  -     losartan (COZAAR) 25 MG tablet; Take 1 tablet (25 mg total) by mouth once daily.    Anxiety  -     EScitalopram oxalate (LEXAPRO)  5 MG Tab; Take 1 tablet (5 mg total) by mouth once daily.  -     busPIRone (BUSPAR) 7.5 MG tablet; Take 1 tablet (7.5 mg total) by mouth 3 (three) times daily.           Hypertension: Will start losartan 25 mg once daily today.   Anxiety: Will start lexapro and Buspar as prescribed.  Potential side effects discussed with patient.  Patient instructed to seek emergency help if he has thoughts of suicide or homicide.   Follow up in 2 weeks.

## 2023-01-26 NOTE — TELEPHONE ENCOUNTER
Let try with just the lexapro and see if that helps.  We will talk about at his 2 week follow up if lexapro isn't helping.

## 2023-01-30 ENCOUNTER — OFFICE VISIT (OUTPATIENT)
Dept: ORTHOPEDICS | Facility: CLINIC | Age: 58
End: 2023-01-30
Payer: COMMERCIAL

## 2023-01-30 VITALS
HEIGHT: 72 IN | SYSTOLIC BLOOD PRESSURE: 112 MMHG | WEIGHT: 207 LBS | BODY MASS INDEX: 28.04 KG/M2 | DIASTOLIC BLOOD PRESSURE: 64 MMHG

## 2023-01-30 DIAGNOSIS — M51.36 DDD (DEGENERATIVE DISC DISEASE), LUMBAR: ICD-10-CM

## 2023-01-30 DIAGNOSIS — M47.816 FACET ARTHRITIS OF LUMBAR REGION: Primary | ICD-10-CM

## 2023-01-30 PROCEDURE — 4010F ACE/ARB THERAPY RXD/TAKEN: CPT | Mod: CPTII,S$GLB,, | Performed by: ORTHOPAEDIC SURGERY

## 2023-01-30 PROCEDURE — 3074F PR MOST RECENT SYSTOLIC BLOOD PRESSURE < 130 MM HG: ICD-10-PCS | Mod: CPTII,S$GLB,, | Performed by: ORTHOPAEDIC SURGERY

## 2023-01-30 PROCEDURE — 3044F PR MOST RECENT HEMOGLOBIN A1C LEVEL <7.0%: ICD-10-PCS | Mod: CPTII,S$GLB,, | Performed by: ORTHOPAEDIC SURGERY

## 2023-01-30 PROCEDURE — 3074F SYST BP LT 130 MM HG: CPT | Mod: CPTII,S$GLB,, | Performed by: ORTHOPAEDIC SURGERY

## 2023-01-30 PROCEDURE — 99203 OFFICE O/P NEW LOW 30 MIN: CPT | Mod: S$GLB,,, | Performed by: ORTHOPAEDIC SURGERY

## 2023-01-30 PROCEDURE — 3078F DIAST BP <80 MM HG: CPT | Mod: CPTII,S$GLB,, | Performed by: ORTHOPAEDIC SURGERY

## 2023-01-30 PROCEDURE — 3008F PR BODY MASS INDEX (BMI) DOCUMENTED: ICD-10-PCS | Mod: CPTII,S$GLB,, | Performed by: ORTHOPAEDIC SURGERY

## 2023-01-30 PROCEDURE — 99203 PR OFFICE/OUTPT VISIT, NEW, LEVL III, 30-44 MIN: ICD-10-PCS | Mod: S$GLB,,, | Performed by: ORTHOPAEDIC SURGERY

## 2023-01-30 PROCEDURE — 4010F PR ACE/ARB THEARPY RXD/TAKEN: ICD-10-PCS | Mod: CPTII,S$GLB,, | Performed by: ORTHOPAEDIC SURGERY

## 2023-01-30 PROCEDURE — 3078F PR MOST RECENT DIASTOLIC BLOOD PRESSURE < 80 MM HG: ICD-10-PCS | Mod: CPTII,S$GLB,, | Performed by: ORTHOPAEDIC SURGERY

## 2023-01-30 PROCEDURE — 3008F BODY MASS INDEX DOCD: CPT | Mod: CPTII,S$GLB,, | Performed by: ORTHOPAEDIC SURGERY

## 2023-01-30 PROCEDURE — 3044F HG A1C LEVEL LT 7.0%: CPT | Mod: CPTII,S$GLB,, | Performed by: ORTHOPAEDIC SURGERY

## 2023-01-30 RX ORDER — MELOXICAM 15 MG/1
15 TABLET ORAL DAILY
Qty: 30 TABLET | Refills: 2 | Status: SHIPPED | OUTPATIENT
Start: 2023-01-30 | End: 2023-02-14

## 2023-01-30 NOTE — PROGRESS NOTES
Subjective:       Patient ID: Jerod Barrientos is a 57 y.o. male.    Chief Complaint: Pain of the Lumbar Spine (Lumbar pain for a few months, states he doesn't have true radiating pain but gets charley horse/cramps in javier legs w/ extended walking. Pain w/ extended sitting, standing. No treatment. Pain is mainly on left side of l/sp. Muscle tightness)      History of Present Illness    Prior to meeting with the patient I reviewed the medical chart in [facility]. This included reviewing the previous progress notes from our office, review of the patient's last appointment with their primary care provider, review of any visits to the emergency room, and review of any pain management appointments or procedures.   57-year-old male, presents to clinic today as a new patient for evaluation of complaints of chronic low back pain.   endorses only a several month duration however the wife states that he has been having back pain for about 6 months or greater.  He does have some bilateral lower extremity pain which is primarily described as cramping or Charley horses but will endorse some numbness and tingling all the way down to the foot.    Current Medications  Current Outpatient Medications   Medication Sig Dispense Refill    busPIRone (BUSPAR) 7.5 MG tablet Take 1 tablet (7.5 mg total) by mouth 3 (three) times daily. 90 tablet 1    EScitalopram oxalate (LEXAPRO) 5 MG Tab Take 1 tablet (5 mg total) by mouth once daily. 30 tablet 1    losartan (COZAAR) 25 MG tablet Take 1 tablet (25 mg total) by mouth once daily. 30 tablet 1     No current facility-administered medications for this visit.       Allergies  Review of patient's allergies indicates:  No Known Allergies    Past Medical History  History reviewed. No pertinent past medical history.    Surgical History  Past Surgical History:   Procedure Laterality Date    NECK SURGERY         Family History:   Family History   Problem Relation Age of Onset    Cancer Mother          lung cancer    Alcohol abuse Father     Cancer Maternal Grandfather         Throat cancer       Social History:   Social History     Socioeconomic History    Marital status:     Number of children: 2   Occupational History    Occupation: Self Employed in Contruction   Tobacco Use    Smoking status: Some Days     Types: Cigarettes    Smokeless tobacco: Never   Substance and Sexual Activity    Alcohol use: Never    Drug use: Never    Sexual activity: Yes     Partners: Female   Social History Narrative        2 dogs living in home       Hospitalization/Major Diagnostic Procedure:     Review of Systems     General/Constitutional:  Chills denies. Fatigue denies. Fever denies. Weight gain denies. Weight loss denies.    Respiratory:  Shortness of breath denies.    Cardiovascular:  Chest pain denies.    Gastrointestinal:  Constipation denies. Diarrhea denies. Nausea denies. Vomiting denies.     Hematology:  Easy bruising denies. Prolonged bleeding denies.     Genitourinary:  Frequent urination denies. Pain in lower back denies. Painful urination denies.     Musculoskeletal:  See HPI for details    Skin:  Rash denies.    Neurologic:  Dizziness denies. Gait abnormalities denies. Seizures denies. Tingling/Numbess denies.    Psychiatric:  Anxiety denies. Depressed mood denies.     Objective:   Vital Signs:   Vitals:    01/30/23 1325   BP: 112/64        Physical Exam      General Examination:     Constitutional: The patient is alert and oriented to lace person and time. Mood is pleasant.     Head/Face: Normal facial features normal eyebrows    Eyes: Normal extraocular motion bilaterally    Lungs: Respirations are equal and unlabored    Gait is coordinated.    Cardiovascular: There are no swelling or varicosities present.    Lymphatic: Negative for adenopathy    Skin: Normal    Neurological: Level of consciousness normal. Oriented to place person and time and situation    Psychiatric: Oriented to time place  person and situation    Examination of the lumbar spine, patient with a nonantalgic gait, not antalgic sit to stand.  No restrictions in range of motion of the lumbar spine.  Forward flexion extension rotation lateral bending without limitation.  She bilateral straight leg raise is negative.  Muscle strength 5+ and symmetrical in the bilateral lower extremities.  Distally neurovascularly intact.    XRAY Report/ Interpretation :   Narrative & Impression  EXAMINATION:  XR LUMBAR SPINE AP AND LATERAL     CLINICAL HISTORY:  Lumbago with sciatica, unspecified side     TECHNIQUE:  AP, lateral and spot images were performed of the lumbar spine.     COMPARISON:  None     FINDINGS:  Alignment: Alignment is maintained.     Vertebrae: Vertebral body heights are maintained.  No suspicious appearing lytic or blastic lesions.     Discs and facets: Degenerative anterior marginal osteophyte formation at L3-4 through L5-S1.  Intervertebral disc space narrowing at L5-S1.  Lower lumbar facet arthropathy.     Miscellaneous: Mild aortic atherosclerosis.     Impression:     Lower lumbar degenerative change without evidence of acute radiographic abnormality.        Electronically signed by: Humphrey Garcia  Date:                                            01/13/2023  Time:                                           08:53           Exam Ended: 01/13/23 08:23 Last Resulted: 01/13/23 08:53              AP pelvis taken today in the office demonstrates no acute osseous abnormalities.  Visualized soft tissues appear normal.      Assessment:       1. Facet arthritis of lumbar region    2. DDD (degenerative disc disease), lumbar          Plan:       Jerod was seen today for pain.    Diagnoses and all orders for this visit:    Facet arthritis of lumbar region    DDD (degenerative disc disease), lumbar  -     Ambulatory referral/consult to Orthopedics  -     X-Ray Pelvis Routine AP         Follow up in about 2 months (around 3/30/2023) for Re-check  "symptoms, PT/OT follow-up, Mon/Wed with Dr. Long.  This is to attest that the physician's assistant Sergio Jin served in the capacity as a scribe" for this patient encounter.  This is also to verify that I have reviewed the patient's history and helped formulate the treatment plan and discussed it with the physician's assistant.  I have actively participated in the evaluation and treatment plan for this patient is visit.  The treatment plan and medical decision-making is outlined below:  57-year-old male presenting to clinic today as a new patient for complaints of low back pain.  He has facet arthritis L3, L4-S1 noted on his x-ray.  I think his primary complaints are axial back pain and most likely it is facet mediated.  Plan would be to do some physical therapy we will treat him with NSAIDs.  Meloxicam daily, follow-up in 2 months.  We discussed MRI if symptoms not responding to conservative measures and possible referral to Pain Management.    Treatment options were discussed with regards to the nature of the medical condition. Conservative pain intervention and surgical options were discussed in detail. The probability of success of each separate treatment option was discussed. The patient expressed a clear understanding of the treatment options. With regards to surgery, the procedure risk, benefits, complications, and outcomes were discussed. No guarantees were given with regards to surgical outcome.   The risk of complications, morbidity, and mortality of patient management decisions have been made at the time of this visit. These are associated with the patient's problems, diagnostic procedures and treatment options. This includes the possible management options selected and those considered but not selected by the patient after shared medical decision making we discussed with the patient.   This note was created using Dragon voice recognition software that occasionally misinterpreted phrases or " words.

## 2023-02-06 ENCOUNTER — PATIENT MESSAGE (OUTPATIENT)
Dept: ORTHOPEDICS | Facility: CLINIC | Age: 58
End: 2023-02-06

## 2023-02-06 ENCOUNTER — CLINICAL SUPPORT (OUTPATIENT)
Dept: REHABILITATION | Facility: HOSPITAL | Age: 58
End: 2023-02-06
Attending: ORTHOPAEDIC SURGERY
Payer: COMMERCIAL

## 2023-02-06 DIAGNOSIS — M51.36 DDD (DEGENERATIVE DISC DISEASE), LUMBAR: Primary | ICD-10-CM

## 2023-02-06 DIAGNOSIS — M47.816 FACET ARTHRITIS OF LUMBAR REGION: ICD-10-CM

## 2023-02-06 DIAGNOSIS — M51.36 DDD (DEGENERATIVE DISC DISEASE), LUMBAR: ICD-10-CM

## 2023-02-06 DIAGNOSIS — Z74.09 IMPAIRED FUNCTIONAL MOBILITY AND ACTIVITY TOLERANCE: ICD-10-CM

## 2023-02-06 DIAGNOSIS — G89.29 CHRONIC LEFT-SIDED LOW BACK PAIN WITHOUT SCIATICA: ICD-10-CM

## 2023-02-06 DIAGNOSIS — M54.50 CHRONIC LEFT-SIDED LOW BACK PAIN WITHOUT SCIATICA: ICD-10-CM

## 2023-02-06 PROCEDURE — 97110 THERAPEUTIC EXERCISES: CPT | Mod: PN

## 2023-02-06 PROCEDURE — 97161 PT EVAL LOW COMPLEX 20 MIN: CPT | Mod: PN

## 2023-02-06 RX ORDER — ORPHENADRINE CITRATE 100 MG/1
100 TABLET, EXTENDED RELEASE ORAL 2 TIMES DAILY
Qty: 60 TABLET | Refills: 0 | Status: SHIPPED | OUTPATIENT
Start: 2023-02-06 | End: 2023-02-14

## 2023-02-06 NOTE — PLAN OF CARE
[facility]   Physical Therapy Initial Evaluation     Name: Jerod Barrientos  Clinic Number: [id number]    Therapy Diagnosis:   Encounter Diagnoses   Name Primary?    DDD (degenerative disc disease), lumbar     Facet arthritis of lumbar region     Chronic left-sided low back pain without sciatica     Impaired functional mobility and activity tolerance      Physician: Siva Long MD     Physician Orders: PT Eval and Treat  Medical Diagnosis from Referral:   M51.36 (ICD-10-CM) - DDD (degenerative disc disease), lumbar   M47.816 (ICD-10-CM) - Facet arthritis of lumbar region     Evaluation Date: 2/6/2023  Authorization Period Expiration: 12/31/23  Plan of Care Expiration: 4/10/2023    Progress Update: 3/4/2023    Visit # / Visits authorized: 1 / 1     FOTO: Visit #1 - 1 / 3     PRECAUTIONS: Standard Precautions     MD Visit: 4/2023    Time In: 710  Time Out: 800  Total Appointment Time (timed & untimed codes): 50 minutes    SUBJECTIVE     Date of onset: Few months    History of current condition - Jerod is a 57 y.o. male whom reports left sided low back pain with an insidious onset. Has constant ache that gets worse with activity.Job requires a heavy physical demand level and awkward positions at times. Medication is not helping. 1st time he has dealt with this issue. He is trying massage, heat stretches at home, that so far is not helping  Jerod's current exercise regiment includes: home stretches not helping.  Seeking Physical Therapy for less pain with work/activity    QIAN: Insidious  Falls: None  Physician Instructions (per patient): none  Other concerns: none    Imaging: No updated imaging for this episode of care:     Prior Therapy: N/A  Social History: Pt lives with their spouse  Living Environment: Stairs   ADLs unable to complete: None just increased pain and effort  Gym/Home Equipment: none  Occupation: Pt is Walking, climbing ladders, 5 gallon buckets of pain, awkward  positions  Prior Level of Function: Independent with all ADLs  Current Level of Function: 70% of PLOF    Pain:  Current 5 /10, worst 9 /10, best 4 /10   Location: Left side lumbar  Description: Aching, Tight, Deep, and Sharp  Aggravating Factors: Activity  Easing Factors: rest    Pts goals: Pt reported goals are less pain with work/activity    _______________________________________________________    Medical History:   No past medical history on file.    Surgical History:   Jerod Barrientos  has a past surgical history that includes Neck surgery.    Medications:   Jerod has a current medication list which includes the following prescription(s): buspirone, escitalopram oxalate, losartan, and meloxicam.    Allergies:   Review of patient's allergies indicates:  No Known Allergies     OBJECTIVE   (x = not tested due to pain and/or inability to obtain test position)    RANGE OF MOTION:    Lumbar AROM/PROM Right  (spine) Left   Goal   Lumbar Flexion (60) 30  55     Lumbar Extension (30) 15  30     Lumbar Side Bending (25) 15 15 20     Lumbar Rotation 10 10 4       Hip AROM/PROM Right   Left   Goal   Hip Flexion (120) 100 100 115     Hip IR (45) 20 20 40     Hip ER (45) 40 40 40           Ankle/Foot AROM/PROM Right   Left   Goal   Dorsiflexion (20) 5 5 15     Plantarflexion (50) 40 40 45       STRENGTH:    L/E MMT Right   Goal   Hip Flexion  5/5 5/5 B    Hip Extension  5/5 5/5 B   Hip Abduction  4+/5 5/5 B   Hip IR 5/5 5/5 B   Hip ER 5/5 5/5 B   Knee Flexion 5/5 5/5 B   Knee Extension 5/5 5/5 B   Ankle DF 5/5 5/5 B   Ankle PF 5/5 5/5 B     L/E MMT Left   Goal   Hip Flexion  5/5 5/5 B    Hip Extension  5/5 5/5 B   Hip Abduction  4+/5 5/5 B   Hip IR 5/5 5/5 B   Hip ER 5/5 5/5 B   Knee Flexion 5/5 5/5 B   Knee Extension 5/5 5/5 B   Ankle DF 5/5 5/5 B   Ankle PF 5/5 5/5 B     MUSCLE LENGTH:     Muscle Tested  Right   Left    Goal   Hip Flexors  decreased decreased Normal B   Quadriceps decreased decreased Normal B    Hamstrings  decreased decreased Normal B   Piriformis  decreased decreased Normal B   Gastrocnemius  decreased decreased Normal B   Soleus  decreased decreased Normal B       SPECIAL TESTS:     Right Left    Goal   Slump negative negative Negative B    SLR negative negative Negative B    Sacral Provocation negative negative Negative B        Sensation:  Sensation is intact to light touch    Palpation: Increased tone and tenderness noted with palpation to: Lumbar paraspinals and QL     Posture:  Pt presents with postural abnormalities which include: forward head, rounded shoulders, and ankle/foot pronation    Forward Round Rounded Shoulder and Increased Thoracic Kyphosis    Gait Analysis: The patient ambulated with the following assistive device: none; the pt presents with the following gait abnormalities: decreased step length, susan, and arm swing; increased forward flexed posture, trunk sway -     Movement Analysis:   Functional Test  Outcome   Double Leg Squat Half depth, with increased pain   Single Leg Step Down NT       Balance: Balance:    RIGHT   LEFT   Goal   Closed NT --- 60 seconds     Tandem 10 10 20 seconds     Single Leg 10 4 20 seconds         FUNCTION:     CMS Impairment/Limitation/Restriction for FOTO Oswestry Survey    Therapist reviewed FOTO scores for Jerod Barrientos on 2/6/2023.   FOTO documents entered into [employer] - see Media section.    Limitation Score: 44%         TREATMENT   Total Treatment time separate from Evaluation: (20) minutes     Jerod received the treatments listed below:      THERAPEUTIC EXERCISES: to develop strength, endurance, ROM, flexibility, posture and core stabilization for (20)  minutes including: x = performed today    TherEx 2/6/2023    Lower trunk rotation with PB     Double Knee To Chest with PhysioBall      Piriformis and hamstring streching     PhysioBall rollout      BOLD = new this visit  Plan for Next Visit:  Dry Needle Lumbar     Posterior pelvic tilt    Open Books  Child pose    Seated hamstring   Seated Piriformis stretch  Seated QL stretch               PATIENT EDUCATION AND HOME EXERCISES     Education/Self-Care provided:  (included in treatment) minutes   Patient educated on the impairments noted above and the effects of physical therapy intervention to improve overall condition and QOL.   Patient was educated on all the above exercise prior/during/after for proper posture, positioning, and execution for safe performance with home exercise program.   Exercise/Activity modifications:   2/6/2023: EVAL:     Written Home Exercises Provided: yes. Prefers: Electronic Copies  Exercises were reviewed and Jerod was able to demonstrate them prior to the end of the session.  Jerod demonstrated good understanding of the education provided. See EMR under Patient Instructions for exercises provided during therapy sessions.    ASSESSMENT     Jerod is a 57 y.o. male referred to outpatient Physical Therapy with a medical diagnosis of chronic low back pain and muscle tightness. Jerod presents with clinical signs and symptoms that support this diagnosis with .     decreased Lumbar ROM, decreased lower extremity strength, Lumbar joint(s) hypomobility, lower extremity neural tension, and impaired functional mobility. Radicular symptoms are not present.Decreased knee and hip ROM, decreased hip girdle, quad, and hamstring Strength, patellar joint hypomobility, increased joint and soft tissue edema, and impaired functional mobility.Decreased foot and ankle ROM, decreased lower extremity strength, impaired joint mobility of talocural, subtalar, and forefoot joints, impaired balance, and impaired functional mobility.    The above impairments will be addressed through manual therapy techniques, therapeutic exercises, functional training, and modalities as necessary. Patient was treated and educated on exercises for home program, progression of therapy, and benefits of therapy to  "achieve full functional mobility. Patient will benefit from skilled physical therapy to meet short and long term goals and return to prior level of function.    Pt prognosis is Fair.   Pt will benefit from skilled outpatient Physical Therapy to address the deficits stated above and in the chart below, provide pt/family education, and to maximize pt's level of independence.     Plan of care discussed with patient: Yes  Pt's spiritual, cultural and educational needs considered and patient is agreeable to the plan of care and goals as stated below:     Anticipated Barriers for therapy: none    Medical Necessity is demonstrated by the following:   History  Co-morbidities and personal factors that may impact the plan of care Co-morbidities:   difficulty sleeping  No past medical history on file.    Personal Factors:   no deficits     low   Examination  Body Structures and Functions, activity limitations and participation restrictions that may impact the plan of care Body Regions:   back  lower extremities  trunk    Body Systems:    gross symmetry  ROM  strength  gross coordinated movement  balance    Participation Restrictions:   See above in "Current Level of Function"     Activity limitations:   Learning and applying knowledge  no deficits    General Tasks and Commands  no deficits    Communication  no deficits    Mobility  lifting and carrying objects  walking    Self care  no deficits    Domestic Life  doing house work (cleaning house, washing dishes, laundry)    Interactions/Relationships  no deficits    Life Areas  no deficits    Community and Social Life  community life  recreation and leisure         low   Clinical Presentation stable and uncomplicated low   Decision Making/ Complexity Score: low       GOALS:  SHORT TERM GOALS: 4 weeks, () Progress   Recent signs and systems trend is improving in order to progress towards LTG's.    Patient will be independent with HEP in order to further progress and return to " maximal function.    Pain rating at Worst: 5/10 in order to progress towards increased independence with activity.    Patient will be able to correct postural deviations in sitting and standing, to decrease pain and promote postural awareness for injury prevention.       LONG TERM GOALS: 8 weeks, () Progress   Patient will return to normal ADL, recreational, and work related activities with less pain and limitation.     Patient will improve AROM to stated goals in order to return to maximal functional potential.     Patient will improve Strength to stated goals of appropriate musculature in order to improve functional independence.     Pain Rating at Best: 1/10 to improve Quality of Life.     Patient will meet predicted functional outcome (FOTO) score: 27% to increase self-worth & perceived functional ability.    Patient will have met/partially met personal goal of: work without increased back pain          PLAN   Plan of care Certification: 2/6/2023 to 4/10/2023    Outpatient Physical Therapy 1 times weekly for 4 weeks to include any combination of the following interventions: virtual visits, dry needling, modalities, electrical stimulation (IFC, Pre-Mod, Attended with Functional Dry Needling), Manual Therapy, Moist Heat/ Ice, Neuromuscular Re-ed, Patient Education, Self Care, Therapeutic Exercise, Functional Training, and Therapeutic Activites     Thank you for this referral.    Eusebio Euceda, PT      I CERTIFY THE NEED FOR THESE SERVICES FURNISHED UNDER THIS PLAN OF TREATMENT AND WHILE UNDER MY CARE   Physician's comments:     Physician's Signature: ___________________________________________________

## 2023-02-09 ENCOUNTER — OFFICE VISIT (OUTPATIENT)
Dept: FAMILY MEDICINE | Facility: CLINIC | Age: 58
End: 2023-02-09
Payer: COMMERCIAL

## 2023-02-09 VITALS
OXYGEN SATURATION: 98 % | WEIGHT: 208 LBS | SYSTOLIC BLOOD PRESSURE: 138 MMHG | DIASTOLIC BLOOD PRESSURE: 83 MMHG | HEIGHT: 72 IN | BODY MASS INDEX: 28.17 KG/M2 | HEART RATE: 62 BPM | TEMPERATURE: 98 F

## 2023-02-09 DIAGNOSIS — F41.9 ANXIETY: ICD-10-CM

## 2023-02-09 DIAGNOSIS — I10 ESSENTIAL HYPERTENSION: Primary | ICD-10-CM

## 2023-02-09 PROCEDURE — 1160F RVW MEDS BY RX/DR IN RCRD: CPT | Mod: CPTII,S$GLB,,

## 2023-02-09 PROCEDURE — 3075F PR MOST RECENT SYSTOLIC BLOOD PRESS GE 130-139MM HG: ICD-10-PCS | Mod: CPTII,S$GLB,,

## 2023-02-09 PROCEDURE — 3008F PR BODY MASS INDEX (BMI) DOCUMENTED: ICD-10-PCS | Mod: CPTII,S$GLB,,

## 2023-02-09 PROCEDURE — 3075F SYST BP GE 130 - 139MM HG: CPT | Mod: CPTII,S$GLB,,

## 2023-02-09 PROCEDURE — 3079F DIAST BP 80-89 MM HG: CPT | Mod: CPTII,S$GLB,,

## 2023-02-09 PROCEDURE — 3044F HG A1C LEVEL LT 7.0%: CPT | Mod: CPTII,S$GLB,,

## 2023-02-09 PROCEDURE — 3044F PR MOST RECENT HEMOGLOBIN A1C LEVEL <7.0%: ICD-10-PCS | Mod: CPTII,S$GLB,,

## 2023-02-09 PROCEDURE — 1159F PR MEDICATION LIST DOCUMENTED IN MEDICAL RECORD: ICD-10-PCS | Mod: CPTII,S$GLB,,

## 2023-02-09 PROCEDURE — 3008F BODY MASS INDEX DOCD: CPT | Mod: CPTII,S$GLB,,

## 2023-02-09 PROCEDURE — 99213 OFFICE O/P EST LOW 20 MIN: CPT | Mod: S$GLB,,,

## 2023-02-09 PROCEDURE — 1160F PR REVIEW ALL MEDS BY PRESCRIBER/CLIN PHARMACIST DOCUMENTED: ICD-10-PCS | Mod: CPTII,S$GLB,,

## 2023-02-09 PROCEDURE — 99213 PR OFFICE/OUTPT VISIT, EST, LEVL III, 20-29 MIN: ICD-10-PCS | Mod: S$GLB,,,

## 2023-02-09 PROCEDURE — 4010F ACE/ARB THERAPY RXD/TAKEN: CPT | Mod: CPTII,S$GLB,,

## 2023-02-09 PROCEDURE — 1159F MED LIST DOCD IN RCRD: CPT | Mod: CPTII,S$GLB,,

## 2023-02-09 PROCEDURE — 3079F PR MOST RECENT DIASTOLIC BLOOD PRESSURE 80-89 MM HG: ICD-10-PCS | Mod: CPTII,S$GLB,,

## 2023-02-09 PROCEDURE — 4010F PR ACE/ARB THEARPY RXD/TAKEN: ICD-10-PCS | Mod: CPTII,S$GLB,,

## 2023-02-09 NOTE — PROGRESS NOTES
Subjective:       Patient ID: Jerod Barrientos is a 57 y.o. male.    Chief Complaint: Follow-up    Patient presents to clinic for 2 week blood pressure follow up.  He has not been taking blood pressures at home because he states he forgot his wrist blood pressure cuff in this office during his last visit. He is not having chest pain or shortness of breath.  He had a very stressful event this morning and feels blood pressure may be do to that.  He was also started on lexapro and Buspar last visit for anxiety since he has been dealing with a stressful situation with a neighbor.  He states he is feeling a little better. He is not suicidal.  Has been on medication for 2 weeks.       Review of patient's allergies indicates:  No Known Allergies  Social Determinants of Health     Tobacco Use: Medium Risk    Smoking Tobacco Use: Former    Smokeless Tobacco Use: Never    Passive Exposure: Not on file   Alcohol Use: Not on file   Financial Resource Strain: Not on file   Food Insecurity: Not on file   Transportation Needs: Not on file   Physical Activity: Not on file   Stress: Not on file   Social Connections: Not on file   Housing Stability: Not on file   Depression: Low Risk     Last PHQ Score: 0      No past medical history on file.   Past Surgical History:   Procedure Laterality Date    NECK SURGERY        Social History     Socioeconomic History    Marital status:     Number of children: 2         Current Outpatient Medications:     busPIRone (BUSPAR) 7.5 MG tablet, Take 1 tablet (7.5 mg total) by mouth 3 (three) times daily., Disp: 90 tablet, Rfl: 1    EScitalopram oxalate (LEXAPRO) 5 MG Tab, Take 1 tablet (5 mg total) by mouth once daily., Disp: 30 tablet, Rfl: 1    losartan (COZAAR) 25 MG tablet, Take 1 tablet (25 mg total) by mouth once daily., Disp: 30 tablet, Rfl: 1    meloxicam (MOBIC) 15 MG tablet, Take 1 tablet (15 mg total) by mouth once daily., Disp: 30 tablet, Rfl: 2    orphenadrine (NORFLEX) 100  mg tablet, Take 1 tablet (100 mg total) by mouth 2 (two) times daily., Disp: 60 tablet, Rfl: 0    Lab Results   Component Value Date    WBC 5.90 01/13/2023    HGB 15.6 01/13/2023    HCT 47.3 01/13/2023     01/13/2023    CHOL 213 (H) 01/13/2023    TRIG 126 01/13/2023    HDL 40 01/13/2023    ALT 21 01/13/2023    AST 15 01/13/2023     01/13/2023    K 4.2 01/13/2023     01/13/2023    CREATININE 0.8 01/13/2023    BUN 10 01/13/2023    CO2 26 01/13/2023    TSH 1.540 01/13/2023    PSA 0.23 01/13/2023    HGBA1C 5.0 01/13/2023       Review of Systems   Constitutional:  Negative for chills and fever.   Respiratory:  Negative for chest tightness and shortness of breath.    Cardiovascular:  Negative for chest pain, palpitations and leg swelling.   Psychiatric/Behavioral:  Negative for suicidal ideas. The patient is nervous/anxious.         Anxiety improving a little     Objective:      Physical Exam  Vitals reviewed.   Constitutional:       Appearance: Normal appearance.   Cardiovascular:      Rate and Rhythm: Normal rate and regular rhythm.      Pulses: Normal pulses.      Heart sounds: Normal heart sounds.   Pulmonary:      Effort: Pulmonary effort is normal.      Breath sounds: Normal breath sounds.   Skin:     General: Skin is warm and dry.   Neurological:      Mental Status: He is alert.   Psychiatric:         Attention and Perception: Attention normal.         Mood and Affect: Mood normal.         Speech: Speech normal.         Behavior: Behavior normal. Behavior is cooperative.         Thought Content: Thought content normal.         Judgment: Judgment normal.       Assessment:       1. Essential hypertension    2. Anxiety        Plan:       Jerod was seen today for follow-up.    Diagnoses and all orders for this visit:    Essential hypertension    Anxiety     Blood pressure still elevated today which could be due to a stressful event at home this morning.  We have no home readings to see if improving.   He agrees to check his blood pressures at home over next 2 weeks and will return to clinic with readings. No medication changes today.  Anxiety is improving.  Will give medication a couple more weeks and will reassess.  Follow up in 2 weeks for blood pressure and anxiety.

## 2023-02-13 ENCOUNTER — CLINICAL SUPPORT (OUTPATIENT)
Dept: REHABILITATION | Facility: HOSPITAL | Age: 58
End: 2023-02-13
Attending: ORTHOPAEDIC SURGERY
Payer: COMMERCIAL

## 2023-02-13 DIAGNOSIS — Z74.09 IMPAIRED FUNCTIONAL MOBILITY AND ACTIVITY TOLERANCE: ICD-10-CM

## 2023-02-13 DIAGNOSIS — G89.29 CHRONIC LEFT-SIDED LOW BACK PAIN WITHOUT SCIATICA: Primary | ICD-10-CM

## 2023-02-13 DIAGNOSIS — M54.50 CHRONIC LEFT-SIDED LOW BACK PAIN WITHOUT SCIATICA: Primary | ICD-10-CM

## 2023-02-13 PROCEDURE — 97140 MANUAL THERAPY 1/> REGIONS: CPT | Mod: PN

## 2023-02-13 PROCEDURE — 97110 THERAPEUTIC EXERCISES: CPT | Mod: PN

## 2023-02-13 PROCEDURE — 20560 NDL INSJ W/O NJX 1 OR 2 MUSC: CPT

## 2023-02-13 NOTE — PROGRESS NOTES
[facility]  Outpatient Physical Therapy Daily Treatment Note      Name: Jerod Barrientos  Clinic Number: [id number]  Visit Date: 2/13/2023    Therapy Diagnosis:   Encounter Diagnoses   Name Primary?    Chronic left-sided low back pain without sciatica Yes    Impaired functional mobility and activity tolerance        Physician: Siva Long MD  Physician Orders: PT Eval and Treat  Medical Diagnosis from Referral:   M51.36 (ICD-10-CM) - DDD (degenerative disc disease), lumbar   M47.816 (ICD-10-CM) - Facet arthritis of lumbar region      Evaluation Date: 2/6/2023  Authorization Period Expiration: 12/31/23  Plan of Care Expiration: 4/10/2023                Progress Update: 3/4/2023                 Visit # / Visits authorized: 1 / 12                       FOTO: Visit #1 - 1 / 3       PTA Visit #: 0/5     FOTO Due Visit 5 -  Follow up  FOTO Due Visit 10 - Follow up    Time In: 0800  Time Out: 0900  Total Billable Timed: 60 minutes  Total Billable Un-timed: 0 minutes    Precautions: Standard    Subjective     The patient presents to therapy Muscle soreness, was sore after first visit. Has muscle relaxer's not helping    Response to previous treatment: Eval  Functional change: None    Pain: 5/10  Location: left back      Objective       Jerod received therapeutic exercises/NMRE to develop strength, endurance, ROM, flexibility, posture, balance, proprioception, muscle coordination and core stabilization for 30 minutes including:    Lower trunk rotation with PhysioBall x 20  Double Knee To Chest with PhysioBall x 20  Ab Isometric with PhysioBall  x 20  Posterior pelvic tilt x 20  +Bridge with ball squeeze  +Child pose    PhysioBall rollout x 20  Hamstring stretch 3 x 30 sec  Piriformis Stretch 3 x 30 sec  +QL stretch  Gastroc slant board 3 x 30 sec      Dry Needling Performed by Eusebio Euceda, PT, Cert. DN: The patient was cleared of all contraindications and educated on the risks and  effects of dry needling, and post needling expectations. The patient was agreeable to dry needling treatment. Pt signed consent form pre needling. Dry Needling was performed using 0.30 x 50mm needles to lumbar paraspinals  with electrical stimulation applied at 2 Hz for 15  minutes . No adverse affects were noted.       Patient Education and HEP     He was compliant with home exercise program.    Education provided:   - DO Home Exercise Program  Stay active after Dry Needle to help with post soreness    Written Home Exercises Provided: Patient instructed to cont prior HEP.  Exercises were reviewed and Jerod was able to demonstrate them prior to the end of the session.  Jerod demonstrated fair  understanding of the education provided.     See EMR under Patient Instructions for exercises provided prior visit.    Assessment     The patient has tightness in bilateral lumbar paraspinals, pain with activity. Increased lordosis. Work is physically demanding. Did tolerate all today, will continue with Dry Needle to elp pain and guarding, need to work on Core to help when he is at work.    Pt will continue to benefit from skilled outpatient physical therapy to address the deficits listed in the problem list box on initial evaluation, provide pt/family education and to maximize pt's level of independence in the home and community environment.     Jerod Is progressing well towards his goals.   Pt prognosis is Fair.     Pt's spiritual, cultural and educational needs considered and pt agreeable to plan of care and goals.    Anticipated barriers to physical therapy: none    Goals:   SHORT TERM GOALS: 4 weeks, () Progress   Recent signs and systems trend is improving in order to progress towards LTG's.  ongoing  2/13/2023     Patient will be independent with HEP in order to further progress and return to maximal function. Ongoing  2/13/2023      Pain rating at Worst: 5/10 in order to progress towards increased independence with  activity. Ongoing  2/13/2023      Patient will be able to correct postural deviations in sitting and standing, to decrease pain and promote postural awareness for injury prevention.  Ongoing  2/13/2023         LONG TERM GOALS: 8 weeks, () Progress   Patient will return to normal ADL, recreational, and work related activities with less pain and limitation.   ongoing  2/13/2023     Patient will improve AROM to stated goals in order to return to maximal functional potential.  Ongoing  2/13/2023      Patient will improve Strength to stated goals of appropriate musculature in order to improve functional independence.  Ongoing  2/13/2023      Pain Rating at Best: 1/10 to improve Quality of Life.   ongoing  2/13/2023     Patient will meet predicted functional outcome (FOTO) score: 27% to increase self-worth & perceived functional ability.  ongoing  2/13/2023     Patient will have met/partially met personal goal of: work without increased back pain Ongoing  2/13/2023         Plan     Continue with the plan of care established per initial evaluation    Eusebio Euceda, PT

## 2023-02-14 ENCOUNTER — PATIENT MESSAGE (OUTPATIENT)
Dept: FAMILY MEDICINE | Facility: CLINIC | Age: 58
End: 2023-02-14
Payer: COMMERCIAL

## 2023-02-14 RX ORDER — TIZANIDINE 4 MG/1
4 TABLET ORAL EVERY 8 HOURS
Qty: 90 TABLET | Refills: 2 | Status: SHIPPED | OUTPATIENT
Start: 2023-02-14 | End: 2023-02-24

## 2023-02-14 RX ORDER — LOSARTAN POTASSIUM 50 MG/1
50 TABLET ORAL DAILY
Qty: 30 TABLET | Refills: 0 | Status: SHIPPED | OUTPATIENT
Start: 2023-02-14 | End: 2023-02-24 | Stop reason: CLARIF

## 2023-02-14 RX ORDER — IBUPROFEN 800 MG/1
800 TABLET ORAL 3 TIMES DAILY
Qty: 90 TABLET | Refills: 2 | Status: SHIPPED | OUTPATIENT
Start: 2023-02-14 | End: 2023-03-10 | Stop reason: CLARIF

## 2023-02-20 ENCOUNTER — CLINICAL SUPPORT (OUTPATIENT)
Dept: REHABILITATION | Facility: HOSPITAL | Age: 58
End: 2023-02-20
Attending: ORTHOPAEDIC SURGERY
Payer: COMMERCIAL

## 2023-02-20 DIAGNOSIS — G89.29 CHRONIC LEFT-SIDED LOW BACK PAIN WITHOUT SCIATICA: Primary | ICD-10-CM

## 2023-02-20 DIAGNOSIS — M54.50 CHRONIC LEFT-SIDED LOW BACK PAIN WITHOUT SCIATICA: Primary | ICD-10-CM

## 2023-02-20 DIAGNOSIS — Z74.09 IMPAIRED FUNCTIONAL MOBILITY AND ACTIVITY TOLERANCE: ICD-10-CM

## 2023-02-20 PROCEDURE — 97140 MANUAL THERAPY 1/> REGIONS: CPT | Mod: PN

## 2023-02-20 PROCEDURE — 20560 NDL INSJ W/O NJX 1 OR 2 MUSC: CPT

## 2023-02-20 PROCEDURE — 97110 THERAPEUTIC EXERCISES: CPT | Mod: PN

## 2023-02-20 NOTE — PROGRESS NOTES
[facility]  Outpatient Physical Therapy Daily Treatment Note      Name: Jerod Barrientos  Clinic Number: [id number]  Visit Date: 2/20/2023    Therapy Diagnosis:   Encounter Diagnoses   Name Primary?    Chronic left-sided low back pain without sciatica Yes    Impaired functional mobility and activity tolerance        Physician: Siva Long MD  Physician Orders: PT Eval and Treat  Medical Diagnosis from Referral:   M51.36 (ICD-10-CM) - DDD (degenerative disc disease), lumbar   M47.816 (ICD-10-CM) - Facet arthritis of lumbar region      Evaluation Date: 2/6/2023  Authorization Period Expiration: 12/31/23  Plan of Care Expiration: 4/10/2023                Progress Update: 3/4/2023                 Visit # / Visits authorized: 2 / 12                       FOTO: Visit #1 - 1 / 3       PTA Visit #: 0/5     FOTO Due Visit 5 -  Follow up  FOTO Due Visit 10 - Follow up    Time In: 0750  Time Out: 0845  Total Billable Timed: 55 minutes  Total Billable Un-timed: 0 minutes    Precautions: Standard    Subjective     The patient presents to therapy had minimal reduction of pain, did have muscle soreness after Dry Needle     Response to previous treatment: Eval  Functional change: None    Pain: 5/10  Location: left back      Objective       Jerod received therapeutic exercises/NMRE to develop strength, endurance, ROM, flexibility, posture, balance, proprioception, muscle coordination and core stabilization for 30 minutes including:    Lower trunk rotation with PhysioBall x 20  Double Knee To Chest with PhysioBall x 20  Ab Isometric with PhysioBall  x 20  Posterior pelvic tilt x 20  +Bridge with ball squeeze  +Child pose    PhysioBall rollout x 20  Hamstring stretch 3 x 30 sec  Piriformis Stretch 3 x 30 sec  +QL stretch  Gastroc slant board 3 x 30 sec      Dry Needling Performed by Eusebio Euceda, PT, Cert. DN: The patient was cleared of all contraindications and educated on the risks and effects of  dry needling, and post needling expectations. The patient was agreeable to dry needling treatment. Pt signed consent form pre needling. Dry Needling was performed using 0.30 x 50mm needles to lumbar paraspinals  with electrical stimulation applied at 2 Hz for 15  minutes . No adverse affects were noted.       Patient Education and HEP     He was compliant with home exercise program.    Education provided:   - DO Home Exercise Program  Stay active after Dry Needle to help with post soreness    Written Home Exercises Provided: Patient instructed to cont prior HEP.  Exercises were reviewed and Jerod was able to demonstrate them prior to the end of the session.  Jerod demonstrated fair  understanding of the education provided.     See EMR under Patient Instructions for exercises provided prior visit.    Assessment     The patient has continued tightness in bilateral lumbar paraspinals, pain with activity. Increased lordosis. Work is physically demanding, with awkward positions, climbing, lifting carrying, stooping, this is contributing to his continued pain    Pt will continue to benefit from skilled outpatient physical therapy to address the deficits listed in the problem list box on initial evaluation, provide pt/family education and to maximize pt's level of independence in the home and community environment.     Jerod Is progressing well towards his goals.   Pt prognosis is Fair.     Pt's spiritual, cultural and educational needs considered and pt agreeable to plan of care and goals.    Anticipated barriers to physical therapy: none    Goals:   SHORT TERM GOALS: 4 weeks, () Progress   Recent signs and systems trend is improving in order to progress towards LTG's.  ongoing  2/20/2023     Patient will be independent with HEP in order to further progress and return to maximal function. Ongoing  2/20/2023      Pain rating at Worst: 5/10 in order to progress towards increased independence with activity.  Ongoing  2/20/2023      Patient will be able to correct postural deviations in sitting and standing, to decrease pain and promote postural awareness for injury prevention.  Ongoing  2/20/2023         LONG TERM GOALS: 8 weeks, () Progress   Patient will return to normal ADL, recreational, and work related activities with less pain and limitation.   ongoing  2/20/2023     Patient will improve AROM to stated goals in order to return to maximal functional potential.  Ongoing  2/20/2023      Patient will improve Strength to stated goals of appropriate musculature in order to improve functional independence.  Ongoing  2/20/2023      Pain Rating at Best: 1/10 to improve Quality of Life.   ongoing  2/20/2023     Patient will meet predicted functional outcome (FOTO) score: 27% to increase self-worth & perceived functional ability.  ongoing  2/20/2023     Patient will have met/partially met personal goal of: work without increased back pain Ongoing  2/20/2023         Plan     Continue with the plan of care established per initial evaluation    Eusebio Euceda, PT

## 2023-02-24 ENCOUNTER — OFFICE VISIT (OUTPATIENT)
Dept: FAMILY MEDICINE | Facility: CLINIC | Age: 58
End: 2023-02-24
Payer: COMMERCIAL

## 2023-02-24 DIAGNOSIS — L40.9 PSORIASIS: ICD-10-CM

## 2023-02-24 DIAGNOSIS — R06.81 APNEA: ICD-10-CM

## 2023-02-24 DIAGNOSIS — I10 ESSENTIAL HYPERTENSION: ICD-10-CM

## 2023-02-24 DIAGNOSIS — R06.83 SNORING: ICD-10-CM

## 2023-02-24 DIAGNOSIS — R53.83 FATIGUE, UNSPECIFIED TYPE: Primary | ICD-10-CM

## 2023-02-24 PROCEDURE — 99214 OFFICE O/P EST MOD 30 MIN: CPT | Mod: S$GLB,,,

## 2023-02-24 PROCEDURE — 1159F MED LIST DOCD IN RCRD: CPT | Mod: CPTII,S$GLB,,

## 2023-02-24 PROCEDURE — 3044F PR MOST RECENT HEMOGLOBIN A1C LEVEL <7.0%: ICD-10-PCS | Mod: CPTII,S$GLB,,

## 2023-02-24 PROCEDURE — 3079F DIAST BP 80-89 MM HG: CPT | Mod: CPTII,S$GLB,,

## 2023-02-24 PROCEDURE — 3079F PR MOST RECENT DIASTOLIC BLOOD PRESSURE 80-89 MM HG: ICD-10-PCS | Mod: CPTII,S$GLB,,

## 2023-02-24 PROCEDURE — 3044F HG A1C LEVEL LT 7.0%: CPT | Mod: CPTII,S$GLB,,

## 2023-02-24 PROCEDURE — 1159F PR MEDICATION LIST DOCUMENTED IN MEDICAL RECORD: ICD-10-PCS | Mod: CPTII,S$GLB,,

## 2023-02-24 PROCEDURE — 3008F BODY MASS INDEX DOCD: CPT | Mod: CPTII,S$GLB,,

## 2023-02-24 PROCEDURE — 3008F PR BODY MASS INDEX (BMI) DOCUMENTED: ICD-10-PCS | Mod: CPTII,S$GLB,,

## 2023-02-24 PROCEDURE — 3075F SYST BP GE 130 - 139MM HG: CPT | Mod: CPTII,S$GLB,,

## 2023-02-24 PROCEDURE — 99214 PR OFFICE/OUTPT VISIT, EST, LEVL IV, 30-39 MIN: ICD-10-PCS | Mod: S$GLB,,,

## 2023-02-24 PROCEDURE — 4010F PR ACE/ARB THEARPY RXD/TAKEN: ICD-10-PCS | Mod: CPTII,S$GLB,,

## 2023-02-24 PROCEDURE — 4010F ACE/ARB THERAPY RXD/TAKEN: CPT | Mod: CPTII,S$GLB,,

## 2023-02-24 PROCEDURE — 3075F PR MOST RECENT SYSTOLIC BLOOD PRESS GE 130-139MM HG: ICD-10-PCS | Mod: CPTII,S$GLB,,

## 2023-02-24 RX ORDER — TRIAMCINOLONE ACETONIDE 1 MG/G
CREAM TOPICAL 2 TIMES DAILY
Qty: 45 G | Refills: 2 | Status: SHIPPED | OUTPATIENT
Start: 2023-02-24

## 2023-02-24 RX ORDER — AMLODIPINE BESYLATE 10 MG/1
10 TABLET ORAL DAILY
Qty: 30 TABLET | Refills: 1 | Status: SHIPPED | OUTPATIENT
Start: 2023-02-24 | End: 2023-03-10 | Stop reason: SDUPTHER

## 2023-02-24 NOTE — PROGRESS NOTES
Subjective:       Patient ID: Jerod Barrientos is a 57 y.o. male.    Chief Complaint: Hypertension, Fatigue, and Snoring    Patient presents to clinic for 2 week follow up on blood pressure.  He was taking losartan 50 mg daily. B/P at home have been 136/80 and has [place] as 152/88.  He states he has been having episodes of dizziness when standing up from a bent over position since his dose was increased to 50 mg.  He denies passing out or falling down.  He was also referred to Dr. Long for lower back pain.  He is doing Physical Therapy along with muscle relaxers and ibuprofen.  He states he is not getting much relief and the muscles in his lower back feel tight all the time.  He also states his wife is concerned because he snores and stops breathing during sleep.  He often falls asleep when sitting down.  They are wanting him tested for sleep apnea.  Patient is concerned because he is not sure if he will be able to tolerate wearing a mask if he does have sleep apnea.  He also complains of dry patches of skin on both his elbows.      Review of patient's allergies indicates:  No Known Allergies  Social Determinants of Health     Tobacco Use: Medium Risk    Smoking Tobacco Use: Former    Smokeless Tobacco Use: Never    Passive Exposure: Not on file   Alcohol Use: Not on file   Financial Resource Strain: Not on file   Food Insecurity: Not on file   Transportation Needs: Not on file   Physical Activity: Not on file   Stress: Not on file   Social Connections: Not on file   Housing Stability: Not on file   Depression: Low Risk     Last PHQ Score: 0      History reviewed. No pertinent past medical history.   Past Surgical History:   Procedure Laterality Date    NECK SURGERY        Social History     Socioeconomic History    Marital status:     Number of children: 2         Current Outpatient Medications:     amLODIPine (NORVASC) 10 MG tablet, Take 1 tablet (10 mg total) by mouth once daily., Disp: 30 tablet,  Rfl: 1    busPIRone (BUSPAR) 7.5 MG tablet, Take 1 tablet (7.5 mg total) by mouth 3 (three) times daily., Disp: 90 tablet, Rfl: 1    EScitalopram oxalate (LEXAPRO) 5 MG Tab, Take 1 tablet (5 mg total) by mouth once daily., Disp: 30 tablet, Rfl: 1    ibuprofen (ADVIL,MOTRIN) 800 MG tablet, Take 1 tablet (800 mg total) by mouth 3 (three) times daily., Disp: 90 tablet, Rfl: 2    triamcinolone acetonide 0.1% (KENALOG) 0.1 % cream, Apply topically 2 (two) times daily., Disp: 45 g, Rfl: 2    Lab Results   Component Value Date    WBC 5.90 01/13/2023    HGB 15.6 01/13/2023    HCT 47.3 01/13/2023     01/13/2023    CHOL 213 (H) 01/13/2023    TRIG 126 01/13/2023    HDL 40 01/13/2023    ALT 21 01/13/2023    AST 15 01/13/2023     01/13/2023    K 4.2 01/13/2023     01/13/2023    CREATININE 0.8 01/13/2023    BUN 10 01/13/2023    CO2 26 01/13/2023    TSH 1.540 01/13/2023    PSA 0.23 01/13/2023    HGBA1C 5.0 01/13/2023       Review of Systems   Constitutional:  Negative for chills and fever.   Respiratory:  Positive for apnea. Negative for shortness of breath.         Snoring   Cardiovascular:  Negative for chest pain.   Musculoskeletal:  Positive for back pain.   Neurological:  Positive for light-headedness.   Psychiatric/Behavioral:  Positive for sleep disturbance.      Objective:      Physical Exam  Vitals reviewed.   Constitutional:       Appearance: Normal appearance.   Cardiovascular:      Rate and Rhythm: Normal rate and regular rhythm.      Pulses: Normal pulses.           Radial pulses are 2+ on the right side and 2+ on the left side.        Posterior tibial pulses are 2+ on the right side and 2+ on the left side.      Heart sounds: Normal heart sounds, S1 normal and S2 normal.      Comments: Orthostatic blood pressures negative  Pulmonary:      Effort: Pulmonary effort is normal.      Breath sounds: Normal breath sounds.   Musculoskeletal:         General: Normal range of motion.      Right lower leg: No  edema.      Left lower leg: No edema.   Skin:     General: Skin is warm and dry.      Findings: Erythema and rash present. Rash is scaling.      Comments: Erythematous scaly plaques to both elbows.    Neurological:      Mental Status: He is alert.       Assessment:       1. Fatigue, unspecified type    2. Snoring    3. Apnea    4. Essential hypertension    5. Psoriasis          Plan:       Jerod was seen today for hypertension, fatigue and snoring.    Diagnoses and all orders for this visit:    Fatigue, unspecified type  -     Home Sleep Study; Future    Snoring  -     Home Sleep Study; Future    Apnea  -     Home Sleep Study; Future    Essential hypertension  -     amLODIPine (NORVASC) 10 MG tablet; Take 1 tablet (10 mg total) by mouth once daily.    Psoriasis  -     triamcinolone acetonide 0.1% (KENALOG) 0.1 % cream; Apply topically 2 (two) times daily.         Stop losartan and start amlodipine as prescribed.  Orthostatic blood pressures were negative.  Will try changing medication as noted.   Sleep study ordered to assess for sleep apnea.  Discussed different mask types with patient if sleep study is positive.  Apply Kenalog to affected areas for psoriasis lesions.    Will follow up in 2 weeks to assess blood pressure.

## 2023-02-26 VITALS
DIASTOLIC BLOOD PRESSURE: 80 MMHG | HEIGHT: 72 IN | RESPIRATION RATE: 16 BRPM | HEART RATE: 62 BPM | BODY MASS INDEX: 27.9 KG/M2 | WEIGHT: 206 LBS | SYSTOLIC BLOOD PRESSURE: 134 MMHG | TEMPERATURE: 98 F

## 2023-03-06 ENCOUNTER — CLINICAL SUPPORT (OUTPATIENT)
Dept: REHABILITATION | Facility: HOSPITAL | Age: 58
End: 2023-03-06
Attending: ORTHOPAEDIC SURGERY
Payer: COMMERCIAL

## 2023-03-06 ENCOUNTER — PATIENT MESSAGE (OUTPATIENT)
Dept: ORTHOPEDICS | Facility: CLINIC | Age: 58
End: 2023-03-06

## 2023-03-06 DIAGNOSIS — M54.50 CHRONIC LEFT-SIDED LOW BACK PAIN WITHOUT SCIATICA: Primary | ICD-10-CM

## 2023-03-06 DIAGNOSIS — G89.29 CHRONIC LEFT-SIDED LOW BACK PAIN WITHOUT SCIATICA: Primary | ICD-10-CM

## 2023-03-06 DIAGNOSIS — Z74.09 IMPAIRED FUNCTIONAL MOBILITY AND ACTIVITY TOLERANCE: ICD-10-CM

## 2023-03-06 PROCEDURE — 97014 ELECTRIC STIMULATION THERAPY: CPT | Mod: PN

## 2023-03-06 PROCEDURE — 97140 MANUAL THERAPY 1/> REGIONS: CPT | Mod: PN

## 2023-03-06 PROCEDURE — 97110 THERAPEUTIC EXERCISES: CPT | Mod: PN

## 2023-03-06 PROCEDURE — 20560 NDL INSJ W/O NJX 1 OR 2 MUSC: CPT | Mod: PN

## 2023-03-06 NOTE — PROGRESS NOTES
[facility]  Outpatient Physical Therapy Daily Treatment Note      Name: Jerod Barrientos  Clinic Number: [id number]  Visit Date: 3/6/2023    Therapy Diagnosis:   Encounter Diagnoses   Name Primary?    Chronic left-sided low back pain without sciatica Yes    Impaired functional mobility and activity tolerance        Physician: Siva Long MD  Physician Orders: PT Eval and Treat  Medical Diagnosis from Referral:   M51.36 (ICD-10-CM) - DDD (degenerative disc disease), lumbar   M47.816 (ICD-10-CM) - Facet arthritis of lumbar region      Evaluation Date: 2/6/2023  Authorization Period Expiration: 12/31/23  Plan of Care Expiration: 4/10/2023                Progress Update: 3/4/2023                 Visit # / Visits authorized: 3 / 12                       FOTO: Visit #1 - 1 / 3       PTA Visit #: 0/5     FOTO Due Visit 5 -  Follow up  FOTO Due Visit 10 - Follow up    Time In: 0800  Time Out: 0855  Total Billable Timed: 55 minutes  Total Billable Un-timed: 0 minutes    Precautions: Standard    Subjective     The patient presents to therapy has increased pain today after going bow [employer] over the weekend    Response to previous treatment: None  Functional change: None    Pain: 7/10  Location: left back      Objective       Jerod received therapeutic exercises/NMRE to develop strength, endurance, ROM, flexibility, posture, balance, proprioception, muscle coordination and core stabilization for 30 minutes including:    Lower trunk rotation with PhysioBall x 20  Double Knee To Chest with PhysioBall x 20  Ab Isometric with PhysioBall  x 20  Posterior pelvic tilt x 20  +Bridge with ball squeeze  +Child pose    PhysioBall rollout x 20  Hamstring stretch 3 x 30 sec  Piriformis Stretch 3 x 30 sec  +QL stretch  Gastroc slant board 3 x 30 sec    Soft Tissue Massage Low back bilateral     Dry Needling Performed by Eusebio Euceda, PT, Cert. DN: The patient was cleared of all contraindications and  educated on the risks and effects of dry needling, and post needling expectations. The patient was agreeable to dry needling treatment. Pt signed consent form pre needling. Dry Needling was performed using 0.30 x 50mm needles to lumbar paraspinals  with electrical stimulation applied at 2 Hz for 15  minutes . No adverse affects were noted.       Patient Education and HEP     He was compliant with home exercise program.    Education provided:   - DO Home Exercise Program  Stay active after Dry Needle to help with post soreness    Written Home Exercises Provided: Patient instructed to cont prior HEP.  Exercises were reviewed and Jerod was able to demonstrate them prior to the end of the session.  Jerod demonstrated fair  understanding of the education provided.     See EMR under Patient Instructions for exercises provided prior visit.    Assessment     The patient has continued tightness in bilateral lumbar paraspinals, pain with activity. Increased lordosis. Presents with increased pain, antalgic gait and  lordosis today. Was able to walk better when leaving today. Work is physically demanding, with awkward positions, climbing, lifting carrying, stooping, this is contributing to his continued pain. Discussed that he may need to follow up with MD sooner as he is not improving and is actually feeling worse.    Pt will continue to benefit from skilled outpatient physical therapy to address the deficits listed in the problem list box on initial evaluation, provide pt/family education and to maximize pt's level of independence in the home and community environment.     Jerod Is progressing well towards his goals.   Pt prognosis is Fair.     Pt's spiritual, cultural and educational needs considered and pt agreeable to plan of care and goals.    Anticipated barriers to physical therapy: none    Goals:   SHORT TERM GOALS: 4 weeks, () Progress   Recent signs and systems trend is improving in order to progress towards LTG's.   ongoing  3/6/2023     Patient will be independent with HEP in order to further progress and return to maximal function. Ongoing  3/6/2023      Pain rating at Worst: 5/10 in order to progress towards increased independence with activity. Ongoing  3/6/2023      Patient will be able to correct postural deviations in sitting and standing, to decrease pain and promote postural awareness for injury prevention.  Ongoing  3/6/2023         LONG TERM GOALS: 8 weeks, () Progress   Patient will return to normal ADL, recreational, and work related activities with less pain and limitation.   ongoing  3/6/2023     Patient will improve AROM to stated goals in order to return to maximal functional potential.  Ongoing  3/6/2023      Patient will improve Strength to stated goals of appropriate musculature in order to improve functional independence.  Ongoing  3/6/2023      Pain Rating at Best: 1/10 to improve Quality of Life.   ongoing  3/6/2023     Patient will meet predicted functional outcome (FOTO) score: 27% to increase self-worth & perceived functional ability.  ongoing  3/6/2023     Patient will have met/partially met personal goal of: work without increased back pain Ongoing  3/6/2023         Plan     Continue with the plan of care established per initial evaluation    Eusebio Euceda, PT

## 2023-03-10 ENCOUNTER — OFFICE VISIT (OUTPATIENT)
Dept: FAMILY MEDICINE | Facility: CLINIC | Age: 58
End: 2023-03-10
Payer: COMMERCIAL

## 2023-03-10 VITALS
OXYGEN SATURATION: 97 % | SYSTOLIC BLOOD PRESSURE: 132 MMHG | WEIGHT: 205.63 LBS | HEART RATE: 68 BPM | HEIGHT: 72 IN | RESPIRATION RATE: 18 BRPM | DIASTOLIC BLOOD PRESSURE: 80 MMHG | BODY MASS INDEX: 27.85 KG/M2 | TEMPERATURE: 99 F

## 2023-03-10 DIAGNOSIS — I10 ESSENTIAL HYPERTENSION: ICD-10-CM

## 2023-03-10 DIAGNOSIS — F41.9 ANXIETY: Primary | ICD-10-CM

## 2023-03-10 DIAGNOSIS — M54.40 LOW BACK PAIN WITH SCIATICA, SCIATICA LATERALITY UNSPECIFIED, UNSPECIFIED BACK PAIN LATERALITY, UNSPECIFIED CHRONICITY: ICD-10-CM

## 2023-03-10 PROCEDURE — 3075F PR MOST RECENT SYSTOLIC BLOOD PRESS GE 130-139MM HG: ICD-10-PCS | Mod: CPTII,S$GLB,,

## 2023-03-10 PROCEDURE — 3079F PR MOST RECENT DIASTOLIC BLOOD PRESSURE 80-89 MM HG: ICD-10-PCS | Mod: CPTII,S$GLB,,

## 2023-03-10 PROCEDURE — 1159F PR MEDICATION LIST DOCUMENTED IN MEDICAL RECORD: ICD-10-PCS | Mod: CPTII,S$GLB,,

## 2023-03-10 PROCEDURE — 3008F PR BODY MASS INDEX (BMI) DOCUMENTED: ICD-10-PCS | Mod: CPTII,S$GLB,,

## 2023-03-10 PROCEDURE — 1159F MED LIST DOCD IN RCRD: CPT | Mod: CPTII,S$GLB,,

## 2023-03-10 PROCEDURE — 4010F ACE/ARB THERAPY RXD/TAKEN: CPT | Mod: CPTII,S$GLB,,

## 2023-03-10 PROCEDURE — 3079F DIAST BP 80-89 MM HG: CPT | Mod: CPTII,S$GLB,,

## 2023-03-10 PROCEDURE — 3008F BODY MASS INDEX DOCD: CPT | Mod: CPTII,S$GLB,,

## 2023-03-10 PROCEDURE — 99214 PR OFFICE/OUTPT VISIT, EST, LEVL IV, 30-39 MIN: ICD-10-PCS | Mod: S$GLB,,,

## 2023-03-10 PROCEDURE — 4010F PR ACE/ARB THEARPY RXD/TAKEN: ICD-10-PCS | Mod: CPTII,S$GLB,,

## 2023-03-10 PROCEDURE — 3044F HG A1C LEVEL LT 7.0%: CPT | Mod: CPTII,S$GLB,,

## 2023-03-10 PROCEDURE — 99214 OFFICE O/P EST MOD 30 MIN: CPT | Mod: S$GLB,,,

## 2023-03-10 PROCEDURE — 3044F PR MOST RECENT HEMOGLOBIN A1C LEVEL <7.0%: ICD-10-PCS | Mod: CPTII,S$GLB,,

## 2023-03-10 PROCEDURE — 3075F SYST BP GE 130 - 139MM HG: CPT | Mod: CPTII,S$GLB,,

## 2023-03-10 RX ORDER — MELOXICAM 15 MG/1
15 TABLET ORAL
COMMUNITY
Start: 2023-02-28 | End: 2023-03-30 | Stop reason: SDUPTHER

## 2023-03-10 RX ORDER — AMLODIPINE BESYLATE 10 MG/1
10 TABLET ORAL DAILY
Qty: 90 TABLET | Refills: 1 | Status: SHIPPED | OUTPATIENT
Start: 2023-03-10 | End: 2023-09-16

## 2023-03-10 NOTE — PROGRESS NOTES
Subjective:       Patient ID: Jerod Barrientos is a 57 y.o. male.    Chief Complaint: Follow-up (2 week)    Patient presents to clinic for 2 week follow up for blood pressure. Was complaining last visit of dizziness so blood pressure medication was changed.  Today he denies and further dizziness.  Blood pressure is improved today. No chest pain or shortness of breath  Back pain: He is doing Physical Therapy along with muscle relaxers and ibuprofen.  He states he is not getting much relief and the muscles in his lower back feel tight all the time. Went fishing recently and was down a whole day due to severe pain. Physical therapist wants him to see Dr. Long sooner than scheduled.  Patient will follow with Dr. Long for appointment.   Hasn't done home sleep study yet. Had to cancel but will reschedule  He is still complaining of an ongoing issue with his neighbor that is causing him stress.  He is taking lexapro and buspar which does help. No thoughts of suicide or homicide.                 Review of patient's allergies indicates:  No Known Allergies  Social Determinants of Health     Tobacco Use: Medium Risk    Smoking Tobacco Use: Former    Smokeless Tobacco Use: Never    Passive Exposure: Not on file   Alcohol Use: Not on file   Financial Resource Strain: Not on file   Food Insecurity: Not on file   Transportation Needs: Not on file   Physical Activity: Not on file   Stress: Not on file   Social Connections: Not on file   Housing Stability: Not on file   Depression: Low Risk     Last PHQ Score: 0      No past medical history on file.   Past Surgical History:   Procedure Laterality Date    NECK SURGERY        Social History     Socioeconomic History    Marital status:     Number of children: 2         Current Outpatient Medications:     busPIRone (BUSPAR) 7.5 MG tablet, Take 1 tablet (7.5 mg total) by mouth 3 (three) times daily., Disp: 90 tablet, Rfl: 1    EScitalopram oxalate (LEXAPRO) 5 MG Tab,  Take 1 tablet (5 mg total) by mouth once daily., Disp: 30 tablet, Rfl: 1    meloxicam (MOBIC) 15 MG tablet, Take 15 mg by mouth., Disp: , Rfl:     triamcinolone acetonide 0.1% (KENALOG) 0.1 % cream, Apply topically 2 (two) times daily., Disp: 45 g, Rfl: 2    amLODIPine (NORVASC) 10 MG tablet, Take 1 tablet (10 mg total) by mouth once daily., Disp: 90 tablet, Rfl: 1    Lab Results   Component Value Date    WBC 5.90 01/13/2023    HGB 15.6 01/13/2023    HCT 47.3 01/13/2023     01/13/2023    CHOL 213 (H) 01/13/2023    TRIG 126 01/13/2023    HDL 40 01/13/2023    ALT 21 01/13/2023    AST 15 01/13/2023     01/13/2023    K 4.2 01/13/2023     01/13/2023    CREATININE 0.8 01/13/2023    BUN 10 01/13/2023    CO2 26 01/13/2023    TSH 1.540 01/13/2023    PSA 0.23 01/13/2023    HGBA1C 5.0 01/13/2023       Review of Systems   Respiratory: Negative.     Cardiovascular: Negative.    Neurological:  Negative for dizziness.   Psychiatric/Behavioral:  Negative for suicidal ideas. The patient is nervous/anxious.      Objective:      Physical Exam  Vitals reviewed.   Constitutional:       Appearance: Normal appearance.   Cardiovascular:      Rate and Rhythm: Normal rate and regular rhythm.      Pulses: Normal pulses.           Radial pulses are 2+ on the right side and 2+ on the left side.        Posterior tibial pulses are 2+ on the right side and 2+ on the left side.      Heart sounds: Normal heart sounds, S1 normal and S2 normal.   Pulmonary:      Effort: Pulmonary effort is normal.      Breath sounds: Normal breath sounds.   Skin:     General: Skin is warm and dry.   Neurological:      Mental Status: He is alert.   Psychiatric:         Attention and Perception: Attention normal.         Mood and Affect: Mood normal.         Speech: Speech normal.         Behavior: Behavior normal. Behavior is cooperative.         Thought Content: Thought content normal. Thought content does not include homicidal or suicidal ideation.          Judgment: Judgment normal.       Assessment:       1. Anxiety    2. Essential hypertension    3. Low back pain with sciatica, sciatica laterality unspecified, unspecified back pain laterality, unspecified chronicity        Plan:       Jerod was seen today for follow-up.    Diagnoses and all orders for this visit:    Anxiety    Essential hypertension  -     amLODIPine (NORVASC) 10 MG tablet; Take 1 tablet (10 mg total) by mouth once daily.    Low back pain with sciatica, sciatica laterality unspecified, unspecified back pain laterality, unspecified chronicity     Blood pressure is improved.  Will continue on current medications. Continue monitoring blood pressures. Goal is < 130/90.   Back pain: continue physical therapy and follow up with Dr. Long as recommended.   Follow up in 3-6 months or sooner if needed.

## 2023-03-12 PROBLEM — F41.9 ANXIETY: Status: ACTIVE | Noted: 2023-03-12

## 2023-03-12 PROBLEM — I10 ESSENTIAL HYPERTENSION: Status: ACTIVE | Noted: 2023-03-12

## 2023-03-12 PROBLEM — M54.40 LOW BACK PAIN WITH SCIATICA: Status: ACTIVE | Noted: 2023-03-12

## 2023-03-14 ENCOUNTER — PROCEDURE VISIT (OUTPATIENT)
Dept: SLEEP MEDICINE | Facility: HOSPITAL | Age: 58
End: 2023-03-14
Payer: COMMERCIAL

## 2023-03-14 DIAGNOSIS — R06.83 SNORING: ICD-10-CM

## 2023-03-14 DIAGNOSIS — R06.81 APNEA: ICD-10-CM

## 2023-03-14 DIAGNOSIS — R53.83 FATIGUE, UNSPECIFIED TYPE: ICD-10-CM

## 2023-03-14 PROCEDURE — 95806 SLEEP STUDY UNATT&RESP EFFT: CPT

## 2023-03-17 ENCOUNTER — PATIENT MESSAGE (OUTPATIENT)
Dept: ORTHOPEDICS | Facility: CLINIC | Age: 58
End: 2023-03-17

## 2023-03-20 RX ORDER — TRAMADOL HYDROCHLORIDE 50 MG/1
50 TABLET ORAL EVERY 6 HOURS PRN
Qty: 28 TABLET | Refills: 0 | Status: SHIPPED | OUTPATIENT
Start: 2023-03-20 | End: 2023-03-30 | Stop reason: SDUPTHER

## 2023-03-20 RX ORDER — METHOCARBAMOL 500 MG/1
500 TABLET, FILM COATED ORAL 4 TIMES DAILY
Qty: 60 TABLET | Refills: 1 | Status: SHIPPED | OUTPATIENT
Start: 2023-03-20 | End: 2023-03-30 | Stop reason: SDUPTHER

## 2023-03-30 ENCOUNTER — PATIENT MESSAGE (OUTPATIENT)
Dept: ORTHOPEDICS | Facility: CLINIC | Age: 58
End: 2023-03-30

## 2023-03-30 DIAGNOSIS — M47.816 FACET ARTHRITIS OF LUMBAR REGION: ICD-10-CM

## 2023-03-30 DIAGNOSIS — M51.36 DDD (DEGENERATIVE DISC DISEASE), LUMBAR: Primary | ICD-10-CM

## 2023-03-31 ENCOUNTER — PATIENT MESSAGE (OUTPATIENT)
Dept: FAMILY MEDICINE | Facility: CLINIC | Age: 58
End: 2023-03-31
Payer: COMMERCIAL

## 2023-03-31 RX ORDER — MELOXICAM 15 MG/1
15 TABLET ORAL DAILY
Qty: 30 TABLET | Refills: 0 | Status: SHIPPED | OUTPATIENT
Start: 2023-03-31 | End: 2023-04-30

## 2023-03-31 RX ORDER — METHOCARBAMOL 500 MG/1
500 TABLET, FILM COATED ORAL 4 TIMES DAILY
Qty: 60 TABLET | Refills: 1 | Status: SHIPPED | OUTPATIENT
Start: 2023-03-31 | End: 2023-04-14 | Stop reason: SDUPTHER

## 2023-03-31 RX ORDER — TRAMADOL HYDROCHLORIDE 50 MG/1
50 TABLET ORAL EVERY 6 HOURS PRN
Qty: 28 TABLET | Refills: 0 | Status: SHIPPED | OUTPATIENT
Start: 2023-03-31 | End: 2023-04-07

## 2023-04-03 ENCOUNTER — OFFICE VISIT (OUTPATIENT)
Dept: ORTHOPEDICS | Facility: CLINIC | Age: 58
End: 2023-04-03
Payer: COMMERCIAL

## 2023-04-03 ENCOUNTER — PATIENT MESSAGE (OUTPATIENT)
Dept: ORTHOPEDICS | Facility: CLINIC | Age: 58
End: 2023-04-03

## 2023-04-03 VITALS — HEIGHT: 72 IN | WEIGHT: 205 LBS | BODY MASS INDEX: 27.77 KG/M2

## 2023-04-03 DIAGNOSIS — M47.816 FACET ARTHRITIS OF LUMBAR REGION: ICD-10-CM

## 2023-04-03 DIAGNOSIS — M54.16 LUMBAR RADICULOPATHY: ICD-10-CM

## 2023-04-03 DIAGNOSIS — M51.36 DDD (DEGENERATIVE DISC DISEASE), LUMBAR: Primary | ICD-10-CM

## 2023-04-03 PROCEDURE — 3008F BODY MASS INDEX DOCD: CPT | Mod: CPTII,S$GLB,, | Performed by: ORTHOPAEDIC SURGERY

## 2023-04-03 PROCEDURE — 99213 PR OFFICE/OUTPT VISIT, EST, LEVL III, 20-29 MIN: ICD-10-PCS | Mod: S$GLB,,, | Performed by: ORTHOPAEDIC SURGERY

## 2023-04-03 PROCEDURE — 1159F MED LIST DOCD IN RCRD: CPT | Mod: CPTII,S$GLB,, | Performed by: ORTHOPAEDIC SURGERY

## 2023-04-03 PROCEDURE — 99213 OFFICE O/P EST LOW 20 MIN: CPT | Mod: S$GLB,,, | Performed by: ORTHOPAEDIC SURGERY

## 2023-04-03 PROCEDURE — 1159F PR MEDICATION LIST DOCUMENTED IN MEDICAL RECORD: ICD-10-PCS | Mod: CPTII,S$GLB,, | Performed by: ORTHOPAEDIC SURGERY

## 2023-04-03 PROCEDURE — 4010F ACE/ARB THERAPY RXD/TAKEN: CPT | Mod: CPTII,S$GLB,, | Performed by: ORTHOPAEDIC SURGERY

## 2023-04-03 PROCEDURE — 4010F PR ACE/ARB THEARPY RXD/TAKEN: ICD-10-PCS | Mod: CPTII,S$GLB,, | Performed by: ORTHOPAEDIC SURGERY

## 2023-04-03 PROCEDURE — 3008F PR BODY MASS INDEX (BMI) DOCUMENTED: ICD-10-PCS | Mod: CPTII,S$GLB,, | Performed by: ORTHOPAEDIC SURGERY

## 2023-04-03 PROCEDURE — 3044F PR MOST RECENT HEMOGLOBIN A1C LEVEL <7.0%: ICD-10-PCS | Mod: CPTII,S$GLB,, | Performed by: ORTHOPAEDIC SURGERY

## 2023-04-03 PROCEDURE — 1160F RVW MEDS BY RX/DR IN RCRD: CPT | Mod: CPTII,S$GLB,, | Performed by: ORTHOPAEDIC SURGERY

## 2023-04-03 PROCEDURE — 1160F PR REVIEW ALL MEDS BY PRESCRIBER/CLIN PHARMACIST DOCUMENTED: ICD-10-PCS | Mod: CPTII,S$GLB,, | Performed by: ORTHOPAEDIC SURGERY

## 2023-04-03 PROCEDURE — 3044F HG A1C LEVEL LT 7.0%: CPT | Mod: CPTII,S$GLB,, | Performed by: ORTHOPAEDIC SURGERY

## 2023-04-03 RX ORDER — HYDROCODONE BITARTRATE AND ACETAMINOPHEN 5; 325 MG/1; MG/1
1 TABLET ORAL EVERY 8 HOURS PRN
Qty: 21 TABLET | Refills: 0 | Status: SHIPPED | OUTPATIENT
Start: 2023-04-03 | End: 2023-04-10

## 2023-04-03 RX ORDER — IBUPROFEN 800 MG/1
800 TABLET ORAL 3 TIMES DAILY
COMMUNITY
Start: 2023-03-14 | End: 2023-05-15

## 2023-04-03 RX ORDER — TRAMADOL HYDROCHLORIDE 50 MG/1
50 TABLET ORAL EVERY 6 HOURS PRN
Qty: 28 TABLET | Refills: 0 | Status: SHIPPED | OUTPATIENT
Start: 2023-04-03 | End: 2023-04-10

## 2023-04-03 NOTE — PROGRESS NOTES
Subjective:       Patient ID: Jerod Barrientos is a 58 y.o. male.    Chief Complaint: Pain of the Spine (Patient is here for a 2 month PT f/up on Lumbar Spine, PT did not help, made his pain worse, has popping and new pain in his hip bursa's, got a new chair that helps some. )      History of Present Illness    Prior to meeting with the patient I reviewed the medical chart in [facility]. This included reviewing the previous progress notes from our office, review of the patient's last appointment with their primary care provider, review of any visits to the emergency room, and review of any pain management appointments or procedures.   Patient comes in today anti-inflammatory did help his symptoms still has back pain with cramping sensations in both legs particularly with walking no bowel or bladder incontinence.  This is a chronic problem    Current Medications  Current Outpatient Medications   Medication Sig Dispense Refill    amLODIPine (NORVASC) 10 MG tablet Take 1 tablet (10 mg total) by mouth once daily. 90 tablet 1    busPIRone (BUSPAR) 7.5 MG tablet TAKE 1 TABLET BY MOUTH 3 TIMES DAILY. 90 tablet 1    EScitalopram oxalate (LEXAPRO) 5 MG Tab TAKE 1 TABLET BY MOUTH EVERY DAY 90 tablet 0    ibuprofen (ADVIL,MOTRIN) 800 MG tablet Take 800 mg by mouth 3 (three) times daily.      meloxicam (MOBIC) 15 MG tablet Take 1 tablet (15 mg total) by mouth once daily. 30 tablet 0    methocarbamoL (ROBAXIN) 500 MG Tab Take 1 tablet (500 mg total) by mouth 4 (four) times daily. for 15 days 60 tablet 1    traMADoL (ULTRAM) 50 mg tablet Take 1 tablet (50 mg total) by mouth every 6 (six) hours as needed for Pain. 28 tablet 0    triamcinolone acetonide 0.1% (KENALOG) 0.1 % cream Apply topically 2 (two) times daily. 45 g 2    traMADoL (ULTRAM) 50 mg tablet Take 1 tablet (50 mg total) by mouth every 6 (six) hours as needed for Pain. 28 tablet 0     No current facility-administered medications for this visit.       Allergies  Review  of patient's allergies indicates:  No Known Allergies    Past Medical History  History reviewed. No pertinent past medical history.    Surgical History  Past Surgical History:   Procedure Laterality Date    NECK SURGERY         Family History:   Family History   Problem Relation Age of Onset    Cancer Mother         lung cancer    Alcohol abuse Father     Cancer Maternal Grandfather         Throat cancer       Social History:   Social History     Socioeconomic History    Marital status:     Number of children: 2   Occupational History    Occupation: Self Employed in [employer]   Tobacco Use    Smoking status: Former     Types: Cigarettes    Smokeless tobacco: Never   Substance and Sexual Activity    Alcohol use: Never    Drug use: Never    Sexual activity: Yes     Partners: Female   Social History Narrative        2 dogs living in home       Hospitalization/Major Diagnostic Procedure:     Review of Systems     General/Constitutional:  Chills denies. Fatigue denies. Fever denies. Weight gain denies. Weight loss denies.    Respiratory:  Shortness of breath denies.    Cardiovascular:  Chest pain denies.    Gastrointestinal:  Constipation denies. Diarrhea denies. Nausea denies. Vomiting denies.     Hematology:  Easy bruising denies. Prolonged bleeding denies.     Genitourinary:  Frequent urination denies. Pain in lower back denies. Painful urination denies.     Musculoskeletal:  See HPI for details    Skin:  Rash denies.    Neurologic:  Dizziness denies. Gait abnormalities denies. Seizures denies. Tingling/Numbess denies.    Psychiatric:  Anxiety denies. Depressed mood denies.     Objective:   Vital Signs: There were no vitals filed for this visit.     Physical Exam      General Examination:     Constitutional: The patient is alert and oriented to lace person and time. Mood is pleasant.     Head/Face: Normal facial features normal eyebrows    Eyes: Normal extraocular motion bilaterally    Lungs:  Respirations are equal and unlabored    Gait is coordinated.    Cardiovascular: There are no swelling or varicosities present.    Lymphatic: Negative for adenopathy    Skin: Normal    Neurological: Level of consciousness normal. Oriented to place person and time and situation    Psychiatric: Oriented to time place person and situation    Unable stand erect moderate tenderness L1 to sacrum paraspinous muscles pain with extension and bending straight leg raising weakly positive right side motor exam intact no edema adenopathy varicosities  XRAY Report/ Interpretation:  We again reviewed his prior x-rays lumbar spine multilevel facet arthritis lower lumbar spine slight narrowing L5-S1      Assessment:       1. DDD (degenerative disc disease), lumbar    2. Facet arthritis of lumbar region    3. Lumbar radiculopathy        Plan:       Jerod was seen today for pain.    Diagnoses and all orders for this visit:    DDD (degenerative disc disease), lumbar    Facet arthritis of lumbar region    Lumbar radiculopathy    Other orders  -     traMADoL (ULTRAM) 50 mg tablet; Take 1 tablet (50 mg total) by mouth every 6 (six) hours as needed for Pain.         Follow up in about 2 weeks (around 4/17/2023) for MRI Lumbar Results.    Patient has classic neurogenic claudication symptoms I think his leg complaints of from spinal stenosis his back pain from facet arthritis because of failure of conservative measures we advised an MRI study of the lumbar spine at this time  Treatment options were discussed with regards to the nature of the medical condition. Conservative pain intervention and surgical options were discussed in detail. The probability of success of each separate treatment option was discussed. The patient expressed a clear understanding of the treatment options. With regards to surgery, the procedure risk, benefits, complications, and outcomes were discussed. No guarantees were given with regards to surgical outcome.   The  risk of complications, morbidity, and mortality of patient management decisions have been made at the time of this visit. These are associated with the patient's problems, diagnostic procedures and treatment options. This includes the possible management options selected and those considered but not selected by the patient after shared medical decision making we discussed with the patient.     This note was created using Dragon voice recognition software that occasionally misinterpreted phrases or words.

## 2023-04-06 ENCOUNTER — TELEPHONE (OUTPATIENT)
Dept: FAMILY MEDICINE | Facility: CLINIC | Age: 58
End: 2023-04-06
Payer: COMMERCIAL

## 2023-04-06 DIAGNOSIS — G47.33 OSA (OBSTRUCTIVE SLEEP APNEA): Primary | ICD-10-CM

## 2023-04-17 ENCOUNTER — PATIENT MESSAGE (OUTPATIENT)
Dept: ORTHOPEDICS | Facility: CLINIC | Age: 58
End: 2023-04-17

## 2023-04-17 DIAGNOSIS — M51.36 DDD (DEGENERATIVE DISC DISEASE), LUMBAR: Primary | ICD-10-CM

## 2023-04-17 DIAGNOSIS — M47.816 FACET ARTHRITIS OF LUMBAR REGION: ICD-10-CM

## 2023-04-17 DIAGNOSIS — M54.16 LUMBAR RADICULOPATHY: ICD-10-CM

## 2023-04-17 RX ORDER — HYDROCODONE BITARTRATE AND ACETAMINOPHEN 5; 325 MG/1; MG/1
1 TABLET ORAL EVERY 8 HOURS PRN
Qty: 21 TABLET | Refills: 0 | Status: SHIPPED | OUTPATIENT
Start: 2023-04-17 | End: 2023-04-28 | Stop reason: SDUPTHER

## 2023-04-17 NOTE — TELEPHONE ENCOUNTER
And 58-year-old male we saw is a new patient in January.  Follow-up appointment in April.  Did better with NSAIDs but was given a prescription for narcotics for persistent pain and is scheduled to have an MRI.  Currently taking Norco 5 mg t.i.d. number 21.    We refilled this today.

## 2023-04-24 ENCOUNTER — TELEPHONE (OUTPATIENT)
Dept: PAIN MEDICINE | Facility: CLINIC | Age: 58
End: 2023-04-24
Payer: COMMERCIAL

## 2023-04-24 ENCOUNTER — ANESTHESIA (OUTPATIENT)
Dept: ENDOSCOPY | Facility: HOSPITAL | Age: 58
End: 2023-04-24
Payer: COMMERCIAL

## 2023-04-24 ENCOUNTER — HOSPITAL ENCOUNTER (OUTPATIENT)
Facility: HOSPITAL | Age: 58
Discharge: HOME | End: 2023-04-24
Attending: INTERNAL MEDICINE | Admitting: INTERNAL MEDICINE
Payer: COMMERCIAL

## 2023-04-24 ENCOUNTER — ANESTHESIA EVENT (OUTPATIENT)
Dept: ENDOSCOPY | Facility: HOSPITAL | Age: 58
End: 2023-04-24
Payer: COMMERCIAL

## 2023-04-24 ENCOUNTER — OFFICE VISIT (OUTPATIENT)
Dept: ORTHOPEDICS | Facility: CLINIC | Age: 58
End: 2023-04-24
Payer: COMMERCIAL

## 2023-04-24 VITALS
WEIGHT: 205 LBS | DIASTOLIC BLOOD PRESSURE: 78 MMHG | BODY MASS INDEX: 27.77 KG/M2 | HEIGHT: 72 IN | SYSTOLIC BLOOD PRESSURE: 128 MMHG

## 2023-04-24 DIAGNOSIS — K63.5 POLYP OF COLON, UNSPECIFIED PART OF COLON, UNSPECIFIED TYPE: Primary | ICD-10-CM

## 2023-04-24 DIAGNOSIS — M51.36 DDD (DEGENERATIVE DISC DISEASE), LUMBAR: ICD-10-CM

## 2023-04-24 DIAGNOSIS — K57.90 DIVERTICULOSIS: ICD-10-CM

## 2023-04-24 DIAGNOSIS — M47.816 FACET ARTHRITIS OF LUMBAR REGION: Primary | ICD-10-CM

## 2023-04-24 DIAGNOSIS — K64.8 INTERNAL HEMORRHOIDS: ICD-10-CM

## 2023-04-24 DIAGNOSIS — Z12.11 SCREENING FOR COLON CANCER: ICD-10-CM

## 2023-04-24 PROCEDURE — 25000003 PHARM REV CODE 250: Performed by: STUDENT IN AN ORGANIZED HEALTH CARE EDUCATION/TRAINING PROGRAM

## 2023-04-24 PROCEDURE — 88305 TISSUE EXAM BY PATHOLOGIST: CPT | Mod: 26,,, | Performed by: PATHOLOGY

## 2023-04-24 PROCEDURE — 1159F PR MEDICATION LIST DOCUMENTED IN MEDICAL RECORD: ICD-10-PCS | Mod: CPTII,S$GLB,, | Performed by: ORTHOPAEDIC SURGERY

## 2023-04-24 PROCEDURE — 27201012 HC FORCEPS, HOT/COLD, DISP: Performed by: INTERNAL MEDICINE

## 2023-04-24 PROCEDURE — D9220A PRA ANESTHESIA: ICD-10-PCS | Mod: 33,CRNA,, | Performed by: STUDENT IN AN ORGANIZED HEALTH CARE EDUCATION/TRAINING PROGRAM

## 2023-04-24 PROCEDURE — 45380 COLONOSCOPY AND BIOPSY: CPT | Mod: PT,59 | Performed by: INTERNAL MEDICINE

## 2023-04-24 PROCEDURE — 3044F HG A1C LEVEL LT 7.0%: CPT | Mod: CPTII,S$GLB,, | Performed by: ORTHOPAEDIC SURGERY

## 2023-04-24 PROCEDURE — D9220A PRA ANESTHESIA: Mod: 33,CRNA,, | Performed by: STUDENT IN AN ORGANIZED HEALTH CARE EDUCATION/TRAINING PROGRAM

## 2023-04-24 PROCEDURE — 25000003 PHARM REV CODE 250: Performed by: INTERNAL MEDICINE

## 2023-04-24 PROCEDURE — 37000009 HC ANESTHESIA EA ADD 15 MINS: Performed by: INTERNAL MEDICINE

## 2023-04-24 PROCEDURE — D9220A PRA ANESTHESIA: Mod: 33,ANES,, | Performed by: ANESTHESIOLOGY

## 2023-04-24 PROCEDURE — 88305 TISSUE EXAM BY PATHOLOGIST: ICD-10-PCS | Mod: 26,,, | Performed by: PATHOLOGY

## 2023-04-24 PROCEDURE — 3078F DIAST BP <80 MM HG: CPT | Mod: CPTII,S$GLB,, | Performed by: ORTHOPAEDIC SURGERY

## 2023-04-24 PROCEDURE — 3074F PR MOST RECENT SYSTOLIC BLOOD PRESSURE < 130 MM HG: ICD-10-PCS | Mod: CPTII,S$GLB,, | Performed by: ORTHOPAEDIC SURGERY

## 2023-04-24 PROCEDURE — 4010F PR ACE/ARB THEARPY RXD/TAKEN: ICD-10-PCS | Mod: CPTII,S$GLB,, | Performed by: ORTHOPAEDIC SURGERY

## 2023-04-24 PROCEDURE — 37000008 HC ANESTHESIA 1ST 15 MINUTES: Performed by: INTERNAL MEDICINE

## 2023-04-24 PROCEDURE — 3008F PR BODY MASS INDEX (BMI) DOCUMENTED: ICD-10-PCS | Mod: CPTII,S$GLB,, | Performed by: ORTHOPAEDIC SURGERY

## 2023-04-24 PROCEDURE — D9220A PRA ANESTHESIA: ICD-10-PCS | Mod: 33,ANES,, | Performed by: ANESTHESIOLOGY

## 2023-04-24 PROCEDURE — 3074F SYST BP LT 130 MM HG: CPT | Mod: CPTII,S$GLB,, | Performed by: ORTHOPAEDIC SURGERY

## 2023-04-24 PROCEDURE — 63600175 PHARM REV CODE 636 W HCPCS: Performed by: STUDENT IN AN ORGANIZED HEALTH CARE EDUCATION/TRAINING PROGRAM

## 2023-04-24 PROCEDURE — 45385 COLONOSCOPY W/LESION REMOVAL: CPT | Mod: 33,,, | Performed by: INTERNAL MEDICINE

## 2023-04-24 PROCEDURE — 88305 TISSUE EXAM BY PATHOLOGIST: CPT | Performed by: PATHOLOGY

## 2023-04-24 PROCEDURE — 45385 COLONOSCOPY W/LESION REMOVAL: CPT | Mod: PT | Performed by: INTERNAL MEDICINE

## 2023-04-24 PROCEDURE — 1160F PR REVIEW ALL MEDS BY PRESCRIBER/CLIN PHARMACIST DOCUMENTED: ICD-10-PCS | Mod: CPTII,S$GLB,, | Performed by: ORTHOPAEDIC SURGERY

## 2023-04-24 PROCEDURE — 99213 PR OFFICE/OUTPT VISIT, EST, LEVL III, 20-29 MIN: ICD-10-PCS | Mod: S$GLB,,, | Performed by: ORTHOPAEDIC SURGERY

## 2023-04-24 PROCEDURE — 45385 PR COLONOSCOPY,REMV LESN,SNARE: ICD-10-PCS | Mod: 33,,, | Performed by: INTERNAL MEDICINE

## 2023-04-24 PROCEDURE — 99213 OFFICE O/P EST LOW 20 MIN: CPT | Mod: S$GLB,,, | Performed by: ORTHOPAEDIC SURGERY

## 2023-04-24 PROCEDURE — 3008F BODY MASS INDEX DOCD: CPT | Mod: CPTII,S$GLB,, | Performed by: ORTHOPAEDIC SURGERY

## 2023-04-24 PROCEDURE — 45380 COLONOSCOPY AND BIOPSY: CPT | Mod: 33,59,, | Performed by: INTERNAL MEDICINE

## 2023-04-24 PROCEDURE — 1160F RVW MEDS BY RX/DR IN RCRD: CPT | Mod: CPTII,S$GLB,, | Performed by: ORTHOPAEDIC SURGERY

## 2023-04-24 PROCEDURE — 45380 PR COLONOSCOPY,BIOPSY: ICD-10-PCS | Mod: 33,59,, | Performed by: INTERNAL MEDICINE

## 2023-04-24 PROCEDURE — 3078F PR MOST RECENT DIASTOLIC BLOOD PRESSURE < 80 MM HG: ICD-10-PCS | Mod: CPTII,S$GLB,, | Performed by: ORTHOPAEDIC SURGERY

## 2023-04-24 PROCEDURE — 27201089 HC SNARE, DISP (ANY): Performed by: INTERNAL MEDICINE

## 2023-04-24 PROCEDURE — 1159F MED LIST DOCD IN RCRD: CPT | Mod: CPTII,S$GLB,, | Performed by: ORTHOPAEDIC SURGERY

## 2023-04-24 PROCEDURE — 3044F PR MOST RECENT HEMOGLOBIN A1C LEVEL <7.0%: ICD-10-PCS | Mod: CPTII,S$GLB,, | Performed by: ORTHOPAEDIC SURGERY

## 2023-04-24 PROCEDURE — 4010F ACE/ARB THERAPY RXD/TAKEN: CPT | Mod: CPTII,S$GLB,, | Performed by: ORTHOPAEDIC SURGERY

## 2023-04-24 RX ORDER — PROPOFOL 10 MG/ML
VIAL (ML) INTRAVENOUS
Status: DISCONTINUED | OUTPATIENT
Start: 2023-04-24 | End: 2023-04-24

## 2023-04-24 RX ORDER — SODIUM CHLORIDE 9 MG/ML
INJECTION, SOLUTION INTRAVENOUS CONTINUOUS
Status: DISCONTINUED | OUTPATIENT
Start: 2023-04-24 | End: 2023-04-24 | Stop reason: HOSPADM

## 2023-04-24 RX ORDER — LIDOCAINE HYDROCHLORIDE 20 MG/ML
INJECTION INTRAVENOUS
Status: DISCONTINUED | OUTPATIENT
Start: 2023-04-24 | End: 2023-04-24

## 2023-04-24 RX ADMIN — PROPOFOL 50 MG: 10 INJECTION, EMULSION INTRAVENOUS at 01:04

## 2023-04-24 RX ADMIN — SODIUM CHLORIDE: 9 INJECTION, SOLUTION INTRAVENOUS at 11:04

## 2023-04-24 RX ADMIN — PROPOFOL 40 MG: 10 INJECTION, EMULSION INTRAVENOUS at 01:04

## 2023-04-24 RX ADMIN — PROPOFOL 80 MG: 10 INJECTION, EMULSION INTRAVENOUS at 01:04

## 2023-04-24 RX ADMIN — PROPOFOL 30 MG: 10 INJECTION, EMULSION INTRAVENOUS at 01:04

## 2023-04-24 RX ADMIN — LIDOCAINE HYDROCHLORIDE 40 MG: 20 INJECTION, SOLUTION INTRAVENOUS at 01:04

## 2023-04-24 NOTE — ANESTHESIA PREPROCEDURE EVALUATION
04/24/2023  Jerod Barrientos is a 58 y.o., male.      Pre-op Assessment    I have reviewed the Patient Summary Reports.     I have reviewed the Nursing Notes. I have reviewed the NPO Status.   I have reviewed the Medications.     Review of Systems  Anesthesia Hx:  No problems with previous Anesthesia    Social:  Former Smoker    Hematology/Oncology:  Hematology Normal   Oncology Normal     EENT/Dental:EENT/Dental Normal   Cardiovascular:   Hypertension    Pulmonary:  Pulmonary Normal    Hepatic/GI:   Bowel Prep.    Neurological:   Neuromuscular Disease,    Psych:   anxiety          Physical Exam  General: Well nourished, Cooperative, Alert and Oriented    Airway:  Mallampati: II   Mouth Opening: Normal  TM Distance: Normal  Tongue: Normal  Neck ROM: Normal ROM    Dental:  Dentures        Anesthesia Plan  Type of Anesthesia, risks & benefits discussed:    Anesthesia Type: Gen Natural Airway  Intra-op Monitoring Plan: Standard ASA Monitors  Induction:  IV  Informed Consent: Informed consent signed with the Patient and all parties understand the risks and agree with anesthesia plan.  All questions answered.   ASA Score: 2    Ready For Surgery From Anesthesia Perspective.     .

## 2023-04-24 NOTE — PATIENT INSTRUCTIONS
[facility]  [facility]      STEP 1: Diagnostic Medial Branch Blocks (Facet Nerve Blocks)    What are medial branch nerves? Why are medial branch blocks helpful?     Medial branch nerves are very small nerve branches that communicate pain caused by the facet joints in the spine. These nerves do not control any muscles or sensation in the arms or legs. They are located along a bony groove in the spine.     If this procedure has been scheduled, there is strong evidence to suspect that the facet joints are the source of your pain. Benefit may be obtained from having these medial branch nerves temporarily blocked with an anesthetic to see if a more permanent way of blocking these nerves would provide pain relief for a longer term. Blocking these medial branch nerves temporarily stops the transmission of pain signals from the facet joints to the brain. If you receive temporary relief of a portion of your pain after these injections you will likely benefit from radiofrequency ablation of the same nerves. Radiofrequency ablation provides the same amount of relief as the diagnostic blocks, but lasts approximately 6-12 months.     What happens during medial branch blocks?    An IV will be started, so that sedation can be given. You will be placed on the x-ray table and positioned in such a way that the physician can best visualize the bony areas where the medial branch nerves pass. The skin is cleansed using sterile scrub (soap). Next the physician numbs a small area of skin with numbing medicine. The medicine stings for several seconds. Using x-ray guidance, your physician directs a small needle, near the specific nerve or nerves being tested. A small mixture of numbing medicine (anesthetic) and anti-inflammatory (cortisone/steroid) is then injected.    What happens after the procedure?  Immediately after the procedure, you will go back to the recovery area where you will be monitored for  30-60 minutes. You will be asked to report the immediate percentage of pain relief and record the relief experienced during that day on a post injection evaluation sheet (pain diary). You must call and set up a follow up appointment for two weeks after the procedure to discuss the results from this block and determine if you will proceed to step 2 of the treatment of your facet nerves.

## 2023-04-24 NOTE — ANESTHESIA POSTPROCEDURE EVALUATION
Anesthesia Post Evaluation    Patient: Jerod Barrientos    Procedure(s) Performed: Procedure(s) (LRB):  COLONOSCOPY (N/A)    Final Anesthesia Type: general      Patient location during evaluation: PACU  Patient participation: Yes- Able to Participate  Level of consciousness: awake and alert  Post-procedure vital signs: reviewed and stable  Pain management: adequate  Airway patency: patent    PONV status at discharge: No PONV  Anesthetic complications: no      Cardiovascular status: hemodynamically stable  Respiratory status: unassisted and room air  Hydration status: euvolemic  Follow-up not needed.          Vitals Value Taken Time   /75 04/24/23 1350     04/24/23 1416   Pulse 52 04/24/23 1350   Resp 16 04/24/23 1350   SpO2 98 % 04/24/23 1350         Event Time   Out of Recovery 13:56:12         Pain/Sanket Score: Sanket Score: 10 (4/24/2023  1:50 PM)

## 2023-04-24 NOTE — H&P
CC: Screening for colorectal cancer - first occurrence    58 year old male with above. States that symptoms are absent, no alleviating/exacerbating factors. No family history of colorectal CA. No personal history of polyps. No bleeding or weight loss.     ROS:  No headache, no fever/chills, no chest pain/SOB, no nausea/vomiting/diarrhea/constipation/GI bleeding/abdominal pain, no dysuria/hematuria.    VSSAF   Exam:   Alert and oriented x 3; no apparent distress   PERRLA, sclera anicteric  CV: Regular rate/rhythm, normal PMI   Lungs: Clear bilaterally with no wheeze/rales   Abdomen: Soft, NT/ND, normal bowel sounds   Ext: No cyanosis, clubbing     Impression:   As above    Plan:   Proceed with endoscopy. Further recs to follow.

## 2023-04-24 NOTE — PROGRESS NOTES
Subjective:       Patient ID: Jerod Barrientos is a 58 y.o. male.    Chief Complaint: Pain of the Lumbar Spine (Here for MRI results Lumbar, pain varies)      History of Present Illness    Prior to meeting with the patient I reviewed the medical chart in [facility]. This included reviewing the previous progress notes from our office, review of the patient's last appointment with their primary care provider, review of any visits to the emergency room, and review of any pain management appointments or procedures.   Patient 1st follow-up still has persistent back pain even at rest and clicking sensations in his back on left side no true radicular complaints he discuss results MRI    Current Medications  Current Outpatient Medications   Medication Sig Dispense Refill    amLODIPine (NORVASC) 10 MG tablet Take 1 tablet (10 mg total) by mouth once daily. 90 tablet 1    busPIRone (BUSPAR) 7.5 MG tablet TAKE 1 TABLET BY MOUTH 3 TIMES DAILY. 90 tablet 1    EScitalopram oxalate (LEXAPRO) 5 MG Tab TAKE 1 TABLET BY MOUTH EVERY DAY 90 tablet 0    ibuprofen (ADVIL,MOTRIN) 800 MG tablet Take 800 mg by mouth 3 (three) times daily.      meloxicam (MOBIC) 15 MG tablet Take 1 tablet (15 mg total) by mouth once daily. 30 tablet 0    methocarbamoL (ROBAXIN) 500 MG Tab Take 1 tablet (500 mg total) by mouth 4 (four) times daily. for 15 days 60 tablet 1    triamcinolone acetonide 0.1% (KENALOG) 0.1 % cream Apply topically 2 (two) times daily. 45 g 2    HYDROcodone-acetaminophen (NORCO) 5-325 mg per tablet Take 1 tablet by mouth every 8 (eight) hours as needed for Pain. (Patient not taking: Reported on 4/24/2023) 21 tablet 0     No current facility-administered medications for this visit.       Allergies  Review of patient's allergies indicates:  No Known Allergies    Past Medical History  History reviewed. No pertinent past medical history.    Surgical History  Past Surgical History:   Procedure Laterality Date    NECK SURGERY         Family  History:   Family History   Problem Relation Age of Onset    Cancer Mother         lung cancer    Alcohol abuse Father     Cancer Maternal Grandfather         Throat cancer       Social History:   Social History     Socioeconomic History    Marital status:     Number of children: 2   Occupational History    Occupation: Self Employed in [employer]   Tobacco Use    Smoking status: Former     Types: Cigarettes    Smokeless tobacco: Never   Substance and Sexual Activity    Alcohol use: Never    Drug use: Never    Sexual activity: Yes     Partners: Female   Social History Narrative        2 dogs living in home       Hospitalization/Major Diagnostic Procedure:     Review of Systems     General/Constitutional:  Chills denies. Fatigue denies. Fever denies. Weight gain denies. Weight loss denies.    Respiratory:  Shortness of breath denies.    Cardiovascular:  Chest pain denies.    Gastrointestinal:  Constipation denies. Diarrhea denies. Nausea denies. Vomiting denies.     Hematology:  Easy bruising denies. Prolonged bleeding denies.     Genitourinary:  Frequent urination denies. Pain in lower back denies. Painful urination denies.     Musculoskeletal:  See HPI for details    Skin:  Rash denies.    Neurologic:  Dizziness denies. Gait abnormalities denies. Seizures denies. Tingling/Numbess denies.    Psychiatric:  Anxiety denies. Depressed mood denies.     Objective:   Vital Signs:   Vitals:    04/24/23 0827   BP: 128/78        Physical Exam      General Examination:     Constitutional: The patient is alert and oriented to lace person and time. Mood is pleasant.     Head/Face: Normal facial features normal eyebrows    Eyes: Normal extraocular motion bilaterally    Lungs: Respirations are equal and unlabored    Gait is coordinated.    Cardiovascular: There are no swelling or varicosities present.    Lymphatic: Negative for adenopathy    Skin: Normal    Neurological: Level of consciousness normal. Oriented to  place person and time and situation    Psychiatric: Oriented to time place person and situation    Moderate tenderness paraspinous muscles left side mid lumbar area to lumbosacral junction no spasm straight leg raising causes back pain only motor exam intact  XRAY Report/ Interpretation:  Lumbar MRI personally reviewed as well as reports slight narrowing L5-S1 disc space multilevel fluid facet joint arthritis is noted      Assessment:       1. Facet arthritis of lumbar region    2. DDD (degenerative disc disease), lumbar        Plan:       Jerod was seen today for pain.    Diagnoses and all orders for this visit:    Facet arthritis of lumbar region    DDD (degenerative disc disease), lumbar         No follow-ups on file.    Symptoms are more mechanical back pain probably from facet joint arthritis rather than degenerative L5-S1 disc I recommend referral to pain management to be evaluated for possible left-sided medial branch blocks and if indicated rhizotomy on the left side if this is unsuccessful then we will probably consider discogram at L5-S1 if he feels surgery is the only a alternative.  I am hopeful at the medial branch blocks and progress rhizotomy on the left side will suffice patient agrees with treatment plan      Treatment options were discussed with regards to the nature of the medical condition. Conservative pain intervention and surgical options were discussed in detail. The probability of success of each separate treatment option was discussed. The patient expressed a clear understanding of the treatment options. With regards to surgery, the procedure risk, benefits, complications, and outcomes were discussed. No guarantees were given with regards to surgical outcome.   The risk of complications, morbidity, and mortality of patient management decisions have been made at the time of this visit. These are associated with the patient's problems, diagnostic procedures and treatment options. This  includes the possible management options selected and those considered but not selected by the patient after shared medical decision making we discussed with the patient.     This note was created using Dragon voice recognition software that occasionally misinterpreted phrases or words.

## 2023-04-24 NOTE — TELEPHONE ENCOUNTER
Provider Comments 04/24/2023  9:23 AM Siva Long MD Provider Comments -   Note:    Eval and treat lumbar MBB     Ok to schedule consult, with Cara?

## 2023-04-24 NOTE — PLAN OF CARE
Vss, dave po fluids, denies pain, ambulates easily. IV removed, catheter intact. Discharge instructions provided and states understanding. States ready to go home.  Discharged from facility with family per wheelchair.

## 2023-04-24 NOTE — TRANSFER OF CARE
Anesthesia Transfer of Care Note    Patient: Jerod Barrientos    Procedure(s) Performed: Procedure(s) (LRB):  COLONOSCOPY (N/A)    Patient location: GI    Anesthesia Type: general    Transport from OR: Transported from OR on room air with adequate spontaneous ventilation    Post pain: adequate analgesia    Post assessment: no apparent anesthetic complications    Post vital signs: stable    Level of consciousness: awake and alert    Nausea/Vomiting: no nausea/vomiting    Complications: none    Transfer of care protocol was followed      Last vitals:   Visit Vitals  BP (!) 147/83 (BP Location: Left arm, Patient Position: Lying)   Pulse 67   Temp 36.8 °C (98.2 °F) (Skin)   Resp 18   Ht 6' (1.829 m)   Wt 93 kg (205 lb)   SpO2 95%   BMI 27.80 kg/m²

## 2023-04-24 NOTE — PROVATION PATIENT INSTRUCTIONS
Discharge Summary/Instructions after an Endoscopic Procedure  Patient Name: Jerod Penaloza  Patient MRN: [id number]  Patient YOB: 1965  Monday, April 24, 2023  Yunior Diallo MD  Dear patient,  As a result of recent federal legislation (The Federal Cures Act), you may   receive lab or pathology results from your procedure in your [facility]   account before your physician is able to contact you. Your physician or   their representative will relay the results to you with their   recommendations at their soonest availability.  Thank you,  RESTRICTIONS:  During your procedure today, you received medications for sedation.  These   medications may affect your judgment, balance and coordination.  Therefore,   for 24 hours, you have the following restrictions:   - DO NOT drive a car, operate machinery, make legal/financial decisions,   sign important papers or drink alcohol.    ACTIVITY:  Today: no heavy lifting, straining or running due to procedural   sedation/anesthesia.  The following day: return to full activity including work.  DIET:  Eat and drink normally unless instructed otherwise.     TREATMENT FOR COMMON SIDE EFFECTS:  - Mild abdominal pain, nausea, belching, bloating or excessive gas:  rest,   eat lightly and use a heating pad.  - Sore Throat: treat with throat lozenges and/or gargle with warm salt   water.  - Because air was used during the procedure, expelling large amounts of air   from your rectum or belching is normal.  - If a bowel prep was taken, you may not have a bowel movement for 1-3 days.    This is normal.  SYMPTOMS TO WATCH FOR AND REPORT TO YOUR PHYSICIAN:  1. Abdominal pain or bloating, other than gas cramps.  2. Chest pain.  3. Back pain.  4. Signs of infection such as: chills or fever occurring within 24 hours   after the procedure.  5. Rectal bleeding, which would show as bright red, maroon, or black stools.   (A tablespoon of blood from the rectum is not serious, especially if    hemorrhoids are present.)  6. Vomiting.  7. Weakness or dizziness.  GO DIRECTLY TO THE NEAREST EMERGENCY ROOM IF YOU HAVE ANY OF THE FOLLOWING:      Difficulty breathing              Chills and/or fever over 101 F   Persistent vomiting and/or vomiting blood   Severe abdominal pain   Severe chest pain   Black, tarry stools   Bleeding- more than one tablespoon   Any other symptom or condition that you feel may need urgent attention  Your doctor recommends these additional instructions:  If any biopsies were taken, your doctors clinic will contact you in 1 to 2   weeks with any results.  - Patient has a contact number available for emergencies.  The signs and   symptoms of potential delayed complications were discussed with the   patient.  Return to normal activities tomorrow.  Written discharge   instructions were provided to the patient.   - High fiber diet.   - Continue present medications.   - Await pathology results.   - Repeat colonoscopy in 3 years for surveillance.   - Discharge patient to home (ambulatory).   - Return to GI office after studies are complete.  For questions, problems or results please call your physician - Yunior Diallo MD at Work:  [phone].  [facility] EM[facility] RO[facility] PHONE NUMBER: [phone]  IF A COMPLICATION OR EMERGENCY SITUATION ARISES AND YOU ARE UNABLE TO REACH   YOUR PHYSICIAN - GO DIRECTLY TO THE EMERGENCY ROOM.  Yunior Diallo MD  4/24/2023 1:32:51 PM  This report has been verified and signed electronically.  Dear patient,  As a result of recent federal legislation (The Federal Cures Act), you may   receive lab or pathology results from your procedure in your [facility]   account before your physician is able to contact you. Your physician or   their representative will relay the results to you with their   recommendations at their soonest availability.  Thank you,  PROVATION

## 2023-04-25 VITALS
BODY MASS INDEX: 27.77 KG/M2 | RESPIRATION RATE: 16 BRPM | HEIGHT: 72 IN | WEIGHT: 205 LBS | OXYGEN SATURATION: 98 % | DIASTOLIC BLOOD PRESSURE: 75 MMHG | HEART RATE: 52 BPM | SYSTOLIC BLOOD PRESSURE: 119 MMHG | TEMPERATURE: 98 F

## 2023-04-25 NOTE — TELEPHONE ENCOUNTER
----- Message from Ellen Lugo sent at 4/24/2023  4:21 PM CDT -----  Type:  Patient Returning Call    Who Called:  pt     Best Call Back Number:  [phone]    Additional Information:  please advise .

## 2023-04-26 ENCOUNTER — OFFICE VISIT (OUTPATIENT)
Dept: PAIN MEDICINE | Facility: CLINIC | Age: 58
End: 2023-04-26
Payer: COMMERCIAL

## 2023-04-26 ENCOUNTER — PATIENT MESSAGE (OUTPATIENT)
Dept: PAIN MEDICINE | Facility: CLINIC | Age: 58
End: 2023-04-26

## 2023-04-26 ENCOUNTER — TELEPHONE (OUTPATIENT)
Dept: PAIN MEDICINE | Facility: CLINIC | Age: 58
End: 2023-04-26
Payer: COMMERCIAL

## 2023-04-26 VITALS
HEIGHT: 73 IN | BODY MASS INDEX: 27.17 KG/M2 | SYSTOLIC BLOOD PRESSURE: 135 MMHG | WEIGHT: 205 LBS | DIASTOLIC BLOOD PRESSURE: 75 MMHG | HEART RATE: 64 BPM

## 2023-04-26 DIAGNOSIS — M47.896 OTHER SPONDYLOSIS, LUMBAR REGION: Primary | ICD-10-CM

## 2023-04-26 DIAGNOSIS — M51.36 DDD (DEGENERATIVE DISC DISEASE), LUMBAR: ICD-10-CM

## 2023-04-26 DIAGNOSIS — M47.816 FACET ARTHRITIS OF LUMBAR REGION: ICD-10-CM

## 2023-04-26 PROCEDURE — 1159F PR MEDICATION LIST DOCUMENTED IN MEDICAL RECORD: ICD-10-PCS | Mod: CPTII,S$GLB,, | Performed by: PHYSICIAN ASSISTANT

## 2023-04-26 PROCEDURE — 3078F PR MOST RECENT DIASTOLIC BLOOD PRESSURE < 80 MM HG: ICD-10-PCS | Mod: CPTII,S$GLB,, | Performed by: PHYSICIAN ASSISTANT

## 2023-04-26 PROCEDURE — 3008F BODY MASS INDEX DOCD: CPT | Mod: CPTII,S$GLB,, | Performed by: PHYSICIAN ASSISTANT

## 2023-04-26 PROCEDURE — 99204 OFFICE O/P NEW MOD 45 MIN: CPT | Mod: S$GLB,,, | Performed by: PHYSICIAN ASSISTANT

## 2023-04-26 PROCEDURE — 99204 PR OFFICE/OUTPT VISIT, NEW, LEVL IV, 45-59 MIN: ICD-10-PCS | Mod: S$GLB,,, | Performed by: PHYSICIAN ASSISTANT

## 2023-04-26 PROCEDURE — 4010F PR ACE/ARB THEARPY RXD/TAKEN: ICD-10-PCS | Mod: CPTII,S$GLB,, | Performed by: PHYSICIAN ASSISTANT

## 2023-04-26 PROCEDURE — 3008F PR BODY MASS INDEX (BMI) DOCUMENTED: ICD-10-PCS | Mod: CPTII,S$GLB,, | Performed by: PHYSICIAN ASSISTANT

## 2023-04-26 PROCEDURE — 4010F ACE/ARB THERAPY RXD/TAKEN: CPT | Mod: CPTII,S$GLB,, | Performed by: PHYSICIAN ASSISTANT

## 2023-04-26 PROCEDURE — 99999 PR PBB SHADOW E&M-EST. PATIENT-LVL III: CPT | Mod: PBBFAC,,, | Performed by: PHYSICIAN ASSISTANT

## 2023-04-26 PROCEDURE — 99999 PR PBB SHADOW E&M-EST. PATIENT-LVL III: ICD-10-PCS | Mod: PBBFAC,,, | Performed by: PHYSICIAN ASSISTANT

## 2023-04-26 PROCEDURE — 3078F DIAST BP <80 MM HG: CPT | Mod: CPTII,S$GLB,, | Performed by: PHYSICIAN ASSISTANT

## 2023-04-26 PROCEDURE — 3044F PR MOST RECENT HEMOGLOBIN A1C LEVEL <7.0%: ICD-10-PCS | Mod: CPTII,S$GLB,, | Performed by: PHYSICIAN ASSISTANT

## 2023-04-26 PROCEDURE — 3075F PR MOST RECENT SYSTOLIC BLOOD PRESS GE 130-139MM HG: ICD-10-PCS | Mod: CPTII,S$GLB,, | Performed by: PHYSICIAN ASSISTANT

## 2023-04-26 PROCEDURE — 3075F SYST BP GE 130 - 139MM HG: CPT | Mod: CPTII,S$GLB,, | Performed by: PHYSICIAN ASSISTANT

## 2023-04-26 PROCEDURE — 1159F MED LIST DOCD IN RCRD: CPT | Mod: CPTII,S$GLB,, | Performed by: PHYSICIAN ASSISTANT

## 2023-04-26 PROCEDURE — 3044F HG A1C LEVEL LT 7.0%: CPT | Mod: CPTII,S$GLB,, | Performed by: PHYSICIAN ASSISTANT

## 2023-04-26 RX ORDER — TIZANIDINE HYDROCHLORIDE 4 MG/1
1 CAPSULE, GELATIN COATED ORAL EVERY 8 HOURS PRN
Qty: 30 CAPSULE | Refills: 0 | Status: SHIPPED | OUTPATIENT
Start: 2023-04-26 | End: 2023-04-27

## 2023-04-26 RX ORDER — METAXALONE 400 MG/1
400 TABLET ORAL 3 TIMES DAILY
Qty: 30 TABLET | Refills: 0 | Status: SHIPPED | OUTPATIENT
Start: 2023-04-26 | End: 2023-04-27

## 2023-04-26 NOTE — TELEPHONE ENCOUNTER
Encounter Provider:Cara Sullivan PA-C [[id number]]   Authorizing Provider: Cara Sullivan PA-C [[id number]]   Supervising Provider:RANDY LEE [[id number]]   Type of Supervision:Supervision Required   Department:[facility] PAIN MANAGEMENT[[id number]]      Common Order Information   Procedure -> Medial Branch Block (Specify level and laterality) Cmt: left L3,              4, 5      Pre-op Diagnosis -> Other spondylosis, lumbar regio   n       Order Specific Information   Order: Procedure Order to Pain Management [Custom: [id number]]  Order #:           [id number]: 1 FUTURE     Priority: Routine  Class: Clinic Performed     Future Order Information       Expires on:04/26/2024            Expected by:04/2

## 2023-04-26 NOTE — PROGRESS NOTES
Chronic Pain - New Consult    Referring Physician: Siva Long MD    Chief Complaint:   Chief Complaint   Patient presents with    Low-back Pain     3 to 4 month //Eval and treat         SUBJECTIVE:    Jeord Barrientos presents to the clinic for the evaluation of left sided low back pain. The pain started 4 months ago without injury and symptoms have been unchanged.The pain is located in the lumbar area on the left without radiatin.  The pain is described as aching, sharp, shooting, and tight band and is rated as 6/10. The pain is rated with a score of  0/10 on the BEST day and a score of 6/10 on the WORST day.  Symptoms interfere with daily activity, sleeping, and work. The pain is exacerbated by Sitting, Standing, Laying, Bending, and Walking.  The pain is mitigated by heat and laying down.     Patient denies urinary incontinence, bowel incontinence, significant motor weakness, and loss of sensations.    Physical Therapy/Home Exercise: yes  5 visits with dry needling no significant improvement     Pain Disability Index Review:  Last 3 PDI Scores 4/26/2023   Pain Disability Index (PDI) 36       Pain Medications:    - Adjuvant Medications: Robaxin ( Methocarbamol)     report:  Reviewed and consistent with medication use as prescribed.    Pain Procedures: none     Imaging: XRAY Report/ Interpretation:  Lumbar MRI personally reviewed as well as reports slight narrowing L5-S1 disc space multilevel fluid facet joint arthritis is noted    No past medical history on file.  Past Surgical History:   Procedure Laterality Date    COLONOSCOPY N/A 4/24/2023    Procedure: COLONOSCOPY;  Surgeon: Yunior Major MD;  Location: [facility];  Service: Endoscopy;  Laterality: N/A;  lm/ppm    NECK SURGERY       Social History     Socioeconomic History    Marital status:     Number of children: 2   Occupational History    Occupation: Self Employed in [employer]   Tobacco Use    Smoking status: Former     Types:  Cigarettes    Smokeless tobacco: Never   Substance and Sexual Activity    Alcohol use: Never    Drug use: Never    Sexual activity: Yes     Partners: Female   Social History Narrative        2 dogs living in home     Family History   Problem Relation Age of Onset    Cancer Mother         lung cancer    Alcohol abuse Father     Cancer Maternal Grandfather         Throat cancer       Review of patient's allergies indicates:  No Known Allergies    Current Outpatient Medications   Medication Sig    amLODIPine (NORVASC) 10 MG tablet Take 1 tablet (10 mg total) by mouth once daily.    busPIRone (BUSPAR) 7.5 MG tablet TAKE 1 TABLET BY MOUTH THREE TIMES A DAY    EScitalopram oxalate (LEXAPRO) 5 MG Tab TAKE 1 TABLET BY MOUTH EVERY DAY    HYDROcodone-acetaminophen (NORCO) 5-325 mg per tablet Take 1 tablet by mouth every 8 (eight) hours as needed for Pain.    ibuprofen (ADVIL,MOTRIN) 800 MG tablet Take 800 mg by mouth 3 (three) times daily.    meloxicam (MOBIC) 15 MG tablet Take 1 tablet (15 mg total) by mouth once daily.    methocarbamoL (ROBAXIN) 500 MG Tab Take 1 tablet (500 mg total) by mouth 4 (four) times daily. for 15 days    triamcinolone acetonide 0.1% (KENALOG) 0.1 % cream Apply topically 2 (two) times daily.    tiZANidine 4 mg Cap Take 1 capsule by mouth every 8 (eight) hours as needed (muscle pain).     No current facility-administered medications for this visit.       REVIEW OF SYSTEMS:    GENERAL:  No weight loss, malaise or fevers.  HEENT:  Negative for frequent or significant headaches.  NECK:  Negative for lumps, goiter, pain and significant neck swelling.  RESPIRATORY:  Negative for cough, wheezing or shortness of breath.  CARDIOVASCULAR:  Negative for chest pain, leg swelling or palpitations.  GI:  Negative for abdominal discomfort, blood in stools or black stools or change in bowel habits.  MUSCULOSKELETAL:  See HPI.  SKIN:  Negative for lesions, rash, and itching.  PSYCH:  Negative for sleep  "disturbance, mood disorder and recent psychosocial stressors.  HEMATOLOGY/LYMPHOLOGY:  Negative for prolonged bleeding, bruising easily or swollen nodes.  NEURO:   No history of headaches, syncope, paralysis, seizures or tremors.  All other reviewed and negative other than HPI.    OBJECTIVE:    /75   Pulse 64   Ht 6' 1" (1.854 m)   Wt 93 kg (205 lb)   BMI 27.05 kg/m²     PHYSICAL EXAMINATION:    General appearance: Well appearing, in no acute distress, alert and oriented x3.  Psych:  Mood and affect appropriate.  Skin: Skin color, texture, turgor normal, no rashes or lesions, in both upper and lower body.  Head/face:  Normocephalic, atraumatic. No palpable lymph nodes.  Neck: No pain to palpation over the cervical paraspinous muscles. Spurling Negative. No pain with neck flexion, extension, or lateral flexion.   Cor: RRR  Pulm: CTA  GI:  Soft and non-tender.  Back: Straight leg raising in the sitting and supine positions is negative to radicular pain. No pain to palpation over the spine or costovertebral angles. Normal range of motion with pain on extension. TTP left lumbar Paraspinous muscles.   Extremities: Peripheral joint ROM is full and pain free without obvious instability or laxity in all four extremities. No deformities, edema, or skin discoloration. Good capillary refill.  Musculoskeletal: Shoulder, hip, sacroiliac and knee provocative maneuvers are negative. Bilateral upper and lower extremity strength is normal and symmetric.  No atrophy or tone abnormalities are noted.  Neuro: Bilateral upper and lower extremity coordination and muscle stretch reflexes are physiologic and symmetric.  Plantar response are downgoing. No loss of sensation is noted.  Gait: normal.    ASSESSMENT: 58 y.o. year old male with low back pain, consistent with     1. Other spondylosis, lumbar region  Procedure Order to Pain Management      2. Facet arthritis of lumbar region  Ambulatory referral/consult to Pain Clinic    "   3. DDD (degenerative disc disease), lumbar  Ambulatory referral/consult to Pain Clinic            PLAN:    I have stressed the importance of physical activity and a home exercise plan to help with pain and improve overall health.  Consider longer course of PT  Schedule lumbar MBB on left at L3, 4, 5. I have explained the risks, benefits, and alternatives of the procedure in detail. The patient voices understanding and all questions have been answered. The patient agrees to proceed as planned. Written Consent obtained.   Discontinue Robaxin. Start Zanaflex 4 mg q 8 h prn   Counseled patient regarding the importance of activity modification, constant sleeping habits, and physical therapy.  6. If positive MBB, will schedule 2nd block. Otherwise Mr. Barrientos will f/u with Dr. Long to discuss discogram    Cara Sullivan  04/26/2023

## 2023-04-26 NOTE — H&P (VIEW-ONLY)
Chronic Pain - New Consult    Referring Physician: Siav Long MD    Chief Complaint:   Chief Complaint   Patient presents with    Low-back Pain     3 to 4 month //Eval and treat         SUBJECTIVE:    Jerod Barrientos presents to the clinic for the evaluation of left sided low back pain. The pain started 4 months ago without injury and symptoms have been unchanged.The pain is located in the lumbar area on the left without radiatin.  The pain is described as aching, sharp, shooting, and tight band and is rated as 6/10. The pain is rated with a score of  0/10 on the BEST day and a score of 6/10 on the WORST day.  Symptoms interfere with daily activity, sleeping, and work. The pain is exacerbated by Sitting, Standing, Laying, Bending, and Walking.  The pain is mitigated by heat and laying down.     Patient denies urinary incontinence, bowel incontinence, significant motor weakness, and loss of sensations.    Physical Therapy/Home Exercise: yes  5 visits with dry needling no significant improvement     Pain Disability Index Review:  Last 3 PDI Scores 4/26/2023   Pain Disability Index (PDI) 36       Pain Medications:    - Adjuvant Medications: Robaxin ( Methocarbamol)     report:  Reviewed and consistent with medication use as prescribed.    Pain Procedures: none     Imaging: XRAY Report/ Interpretation:  Lumbar MRI personally reviewed as well as reports slight narrowing L5-S1 disc space multilevel fluid facet joint arthritis is noted    No past medical history on file.  Past Surgical History:   Procedure Laterality Date    COLONOSCOPY N/A 4/24/2023    Procedure: COLONOSCOPY;  Surgeon: Yunior Major MD;  Location: [facility];  Service: Endoscopy;  Laterality: N/A;  lm/ppm    NECK SURGERY       Social History     Socioeconomic History    Marital status:     Number of children: 2   Occupational History    Occupation: Self Employed in Cont[employer]   Tobacco Use    Smoking status: Former     Types:  Cigarettes    Smokeless tobacco: Never   Substance and Sexual Activity    Alcohol use: Never    Drug use: Never    Sexual activity: Yes     Partners: Female   Social History Narrative        2 dogs living in home     Family History   Problem Relation Age of Onset    Cancer Mother         lung cancer    Alcohol abuse Father     Cancer Maternal Grandfather         Throat cancer       Review of patient's allergies indicates:  No Known Allergies    Current Outpatient Medications   Medication Sig    amLODIPine (NORVASC) 10 MG tablet Take 1 tablet (10 mg total) by mouth once daily.    busPIRone (BUSPAR) 7.5 MG tablet TAKE 1 TABLET BY MOUTH THREE TIMES A DAY    EScitalopram oxalate (LEXAPRO) 5 MG Tab TAKE 1 TABLET BY MOUTH EVERY DAY    HYDROcodone-acetaminophen (NORCO) 5-325 mg per tablet Take 1 tablet by mouth every 8 (eight) hours as needed for Pain.    ibuprofen (ADVIL,MOTRIN) 800 MG tablet Take 800 mg by mouth 3 (three) times daily.    meloxicam (MOBIC) 15 MG tablet Take 1 tablet (15 mg total) by mouth once daily.    methocarbamoL (ROBAXIN) 500 MG Tab Take 1 tablet (500 mg total) by mouth 4 (four) times daily. for 15 days    triamcinolone acetonide 0.1% (KENALOG) 0.1 % cream Apply topically 2 (two) times daily.    tiZANidine 4 mg Cap Take 1 capsule by mouth every 8 (eight) hours as needed (muscle pain).     No current facility-administered medications for this visit.       REVIEW OF SYSTEMS:    GENERAL:  No weight loss, malaise or fevers.  HEENT:  Negative for frequent or significant headaches.  NECK:  Negative for lumps, goiter, pain and significant neck swelling.  RESPIRATORY:  Negative for cough, wheezing or shortness of breath.  CARDIOVASCULAR:  Negative for chest pain, leg swelling or palpitations.  GI:  Negative for abdominal discomfort, blood in stools or black stools or change in bowel habits.  MUSCULOSKELETAL:  See HPI.  SKIN:  Negative for lesions, rash, and itching.  PSYCH:  Negative for sleep  "disturbance, mood disorder and recent psychosocial stressors.  HEMATOLOGY/LYMPHOLOGY:  Negative for prolonged bleeding, bruising easily or swollen nodes.  NEURO:   No history of headaches, syncope, paralysis, seizures or tremors.  All other reviewed and negative other than HPI.    OBJECTIVE:    /75   Pulse 64   Ht 6' 1" (1.854 m)   Wt 93 kg (205 lb)   BMI 27.05 kg/m²     PHYSICAL EXAMINATION:    General appearance: Well appearing, in no acute distress, alert and oriented x3.  Psych:  Mood and affect appropriate.  Skin: Skin color, texture, turgor normal, no rashes or lesions, in both upper and lower body.  Head/face:  Normocephalic, atraumatic. No palpable lymph nodes.  Neck: No pain to palpation over the cervical paraspinous muscles. Spurling Negative. No pain with neck flexion, extension, or lateral flexion.   Cor: RRR  Pulm: CTA  GI:  Soft and non-tender.  Back: Straight leg raising in the sitting and supine positions is negative to radicular pain. No pain to palpation over the spine or costovertebral angles. Normal range of motion with pain on extension. TTP left lumbar Paraspinous muscles.   Extremities: Peripheral joint ROM is full and pain free without obvious instability or laxity in all four extremities. No deformities, edema, or skin discoloration. Good capillary refill.  Musculoskeletal: Shoulder, hip, sacroiliac and knee provocative maneuvers are negative. Bilateral upper and lower extremity strength is normal and symmetric.  No atrophy or tone abnormalities are noted.  Neuro: Bilateral upper and lower extremity coordination and muscle stretch reflexes are physiologic and symmetric.  Plantar response are downgoing. No loss of sensation is noted.  Gait: normal.    ASSESSMENT: 58 y.o. year old male with low back pain, consistent with     1. Other spondylosis, lumbar region  Procedure Order to Pain Management      2. Facet arthritis of lumbar region  Ambulatory referral/consult to Pain Clinic    "   3. DDD (degenerative disc disease), lumbar  Ambulatory referral/consult to Pain Clinic            PLAN:    I have stressed the importance of physical activity and a home exercise plan to help with pain and improve overall health.  Consider longer course of PT  Schedule lumbar MBB on left at L3, 4, 5. I have explained the risks, benefits, and alternatives of the procedure in detail. The patient voices understanding and all questions have been answered. The patient agrees to proceed as planned. Written Consent obtained.   Discontinue Robaxin. Start Zanaflex 4 mg q 8 h prn   Counseled patient regarding the importance of activity modification, constant sleeping habits, and physical therapy.  6. If positive MBB, will schedule 2nd block. Otherwise Mr. Barrientos will f/u with Dr. Long to discuss discogram    Cara Sullivan  04/26/2023     normal...

## 2023-04-27 ENCOUNTER — PATIENT MESSAGE (OUTPATIENT)
Dept: PAIN MEDICINE | Facility: CLINIC | Age: 58
End: 2023-04-27
Payer: COMMERCIAL

## 2023-04-27 ENCOUNTER — TELEPHONE (OUTPATIENT)
Dept: FAMILY MEDICINE | Facility: CLINIC | Age: 58
End: 2023-04-27
Payer: COMMERCIAL

## 2023-04-27 ENCOUNTER — PATIENT MESSAGE (OUTPATIENT)
Dept: GASTROENTEROLOGY | Facility: CLINIC | Age: 58
End: 2023-04-27
Payer: COMMERCIAL

## 2023-04-27 RX ORDER — METAXALONE 400 MG/1
400 TABLET ORAL 3 TIMES DAILY
Qty: 30 TABLET | Refills: 0 | OUTPATIENT
Start: 2023-04-27 | End: 2023-05-07

## 2023-04-27 RX ORDER — CYCLOBENZAPRINE HCL 10 MG
10 TABLET ORAL 3 TIMES DAILY PRN
Qty: 30 TABLET | Refills: 0 | Status: SHIPPED | OUTPATIENT
Start: 2023-04-27 | End: 2023-05-07

## 2023-04-27 NOTE — TELEPHONE ENCOUNTER
----- Message from Cipriano Cristobal sent at 4/27/2023  3:56 PM CDT -----  Regarding: Return Call  Contact: Daisha BEAR with [employer]  Type:  Patient Returning Call    Who Called:Daisha with [employer]  Who Left Message for Patient:office staff please call  Does the patient know what this is regarding?:medication order faxed over to be signed/dated  Would the patient rather a call back or a response via [facility]? fax  Best Call Back Number:Fax: [phone] ph: [phone] exp [phone]  Additional Information: Daisha with [employer] called regarding a fax she sent needing to be signed and dated and sent back please.

## 2023-04-28 DIAGNOSIS — M51.36 DDD (DEGENERATIVE DISC DISEASE), LUMBAR: ICD-10-CM

## 2023-04-28 DIAGNOSIS — R10.10 UPPER ABDOMINAL PAIN: Primary | ICD-10-CM

## 2023-04-28 DIAGNOSIS — R19.5 DARK STOOLS: ICD-10-CM

## 2023-04-28 DIAGNOSIS — M54.16 LUMBAR RADICULOPATHY: ICD-10-CM

## 2023-04-28 DIAGNOSIS — M47.816 FACET ARTHRITIS OF LUMBAR REGION: ICD-10-CM

## 2023-04-28 RX ORDER — HYDROCODONE BITARTRATE AND ACETAMINOPHEN 5; 325 MG/1; MG/1
1 TABLET ORAL EVERY 8 HOURS PRN
Qty: 21 TABLET | Refills: 0 | Status: SHIPPED | OUTPATIENT
Start: 2023-04-28 | End: 2023-05-05

## 2023-04-28 NOTE — TELEPHONE ENCOUNTER
Recommend continuing muscle relaxant and ibuprofen for inflammation. I will send in a short prescription for Hydrocodone but this should not be continued consistently and this will be his 3rd week.

## 2023-04-30 LAB
FINAL PATHOLOGIC DIAGNOSIS: NORMAL
GROSS: NORMAL
Lab: NORMAL

## 2023-05-04 NOTE — PROGRESS NOTES
Please advise patient that polyp pathology was reviewed and is benign and is the sessile serrated type which is precancerous/risk factor for cancer.  Repeat colonoscopy recommended in 3 years.  Place reminder in system for repeat colonoscopy.

## 2023-05-08 ENCOUNTER — PATIENT MESSAGE (OUTPATIENT)
Dept: PAIN MEDICINE | Facility: CLINIC | Age: 58
End: 2023-05-08
Payer: COMMERCIAL

## 2023-05-09 RX ORDER — GABAPENTIN 300 MG/1
300 CAPSULE ORAL 3 TIMES DAILY
Qty: 90 CAPSULE | Refills: 0 | Status: SHIPPED | OUTPATIENT
Start: 2023-05-09 | End: 2023-08-03

## 2023-05-09 RX ORDER — DULOXETIN HYDROCHLORIDE 30 MG/1
30 CAPSULE, DELAYED RELEASE ORAL DAILY
Qty: 30 CAPSULE | Refills: 0 | Status: SHIPPED | OUTPATIENT
Start: 2023-05-16 | End: 2023-08-03

## 2023-05-09 NOTE — TELEPHONE ENCOUNTER
He has been on opiate medication for > 3 weeks and he is not getting much relief. He can discontinue that and we will start Gabapentin. Recommend restarting PT and completing an entire course.  I believe we have exhausted all available muscle relaxants. He will need to decide which one was the most effective with the most tolerable Side effects.

## 2023-05-09 NOTE — TELEPHONE ENCOUNTER
Spoke with pt he still declines PT , explained MBB and how it works for him. Advised opiates are not for over 3 weeks use, he is willing to try Gabapentin and Cymbalta one week apart please order. Thank you.

## 2023-05-15 RX ORDER — IBUPROFEN 800 MG/1
TABLET ORAL
Qty: 90 TABLET | Refills: 2 | Status: ON HOLD | OUTPATIENT
Start: 2023-05-15 | End: 2023-05-16 | Stop reason: HOSPADM

## 2023-05-16 ENCOUNTER — HOSPITAL ENCOUNTER (OUTPATIENT)
Facility: AMBULARY SURGERY CENTER | Age: 58
Discharge: HOME | End: 2023-05-16
Attending: ANESTHESIOLOGY | Admitting: ANESTHESIOLOGY
Payer: COMMERCIAL

## 2023-05-16 DIAGNOSIS — M47.896 OTHER SPONDYLOSIS, LUMBAR REGION: ICD-10-CM

## 2023-05-16 PROCEDURE — 64493 INJ PARAVERT F JNT L/S 1 LEV: CPT | Mod: LT,,, | Performed by: ANESTHESIOLOGY

## 2023-05-16 PROCEDURE — 64493 PR INJ DX/THER AGNT PARAVERT FACET JOINT,IMG GUIDE,LUMBAR/SAC,1ST LVL: ICD-10-PCS | Mod: LT,,, | Performed by: ANESTHESIOLOGY

## 2023-05-16 PROCEDURE — 64493 INJ PARAVERT F JNT L/S 1 LEV: CPT | Mod: LT | Performed by: ANESTHESIOLOGY

## 2023-05-16 PROCEDURE — 64494 INJ PARAVERT F JNT L/S 2 LEV: CPT | Mod: LT | Performed by: ANESTHESIOLOGY

## 2023-05-16 PROCEDURE — 64494 PR INJ DX/THER AGNT PARAVERT FACET JOINT,IMG GUIDE,LUMBAR/SAC, 2ND LEVEL: ICD-10-PCS | Mod: LT,,, | Performed by: ANESTHESIOLOGY

## 2023-05-16 PROCEDURE — 64494 INJ PARAVERT F JNT L/S 2 LEV: CPT | Mod: LT,,, | Performed by: ANESTHESIOLOGY

## 2023-05-16 RX ORDER — BUPIVACAINE HYDROCHLORIDE 5 MG/ML
INJECTION, SOLUTION EPIDURAL; INTRACAUDAL
Status: DISCONTINUED | OUTPATIENT
Start: 2023-05-16 | End: 2023-05-17 | Stop reason: HOSPADM

## 2023-05-16 RX ORDER — SODIUM CHLORIDE, SODIUM LACTATE, POTASSIUM CHLORIDE, CALCIUM CHLORIDE 600; 310; 30; 20 MG/100ML; MG/100ML; MG/100ML; MG/100ML
INJECTION, SOLUTION INTRAVENOUS ONCE AS NEEDED
Status: COMPLETED | OUTPATIENT
Start: 2023-05-16 | End: 2023-05-16

## 2023-05-16 RX ORDER — MIDAZOLAM HYDROCHLORIDE 1 MG/ML
INJECTION INTRAMUSCULAR; INTRAVENOUS
Status: DISCONTINUED | OUTPATIENT
Start: 2023-05-16 | End: 2023-05-17 | Stop reason: HOSPADM

## 2023-05-16 RX ADMIN — SODIUM CHLORIDE, SODIUM LACTATE, POTASSIUM CHLORIDE, CALCIUM CHLORIDE: 600; 310; 30; 20 INJECTION, SOLUTION INTRAVENOUS at 10:05

## 2023-05-16 NOTE — OP NOTE
PROCEDURE DATE: 5/16/2023    PROCEDURE:  Left L3,4,5 medial branch nerve blocks under fluoroscopy (corresponds to left L4/5 and L5/S1 facets)    DIAGNOSIS:  Other lumbar spondylosis    Post Op diagnosis: Same    PHYSICIAN: Syed Betancourt MD    MEDICATIONS INJECTED: 0.5% bupivicaine, 0.5 ml at each level    SEDATION MEDICATIONS:RN IV Versed    LOCAL ANESTHETIC USED: None    ESTIMATED BLOOD LOSS:  NOne    COMPLICATIONS:  NOne    TECHNIQUE: A time out was taken to identify the patient, procedure and side of the procedure. The patient was placed in a prone position, then prepped and draped in the usual sterile fashion using ChloraPrep and sterile towels.  The levels were determined under fluoroscopic guidance and then marked.  A 25-gauge 3.5 inch needle was introduced to the anatomic location of the L3,4,5 medial branch nerves on the left side. The above medication was then injected. The patient tolerated the procedure well.     The patient was monitored after the procedure. Patient was given pain diary to record pain levels at home.     If found to have greater than a 50% recovery and so will be scheduled for a radiofrequency ablation of the corresponding nerves.  Patient was given post procedure and discharge instructions to follow at home.  The patient was discharged in a stable condition.

## 2023-05-16 NOTE — DISCHARGE SUMMARY
[facility]  Discharge Note  Short Stay    Procedure(s) (LRB):  Block-nerve-medial branch-lumbar (Left)      OUTCOME: Patient tolerated treatment/procedure well without complication and is now ready for discharge.    DISPOSITION: Home or Self Care    FINAL DIAGNOSIS:  <principal problem not specified>    FOLLOWUP: In clinic    DISCHARGE INSTRUCTIONS:    Discharge Procedure Orders   Notify your health care provider if you experience any of the following:  temperature >100.4     Notify your health care provider if you experience any of the following:  severe uncontrolled pain     Notify your health care provider if you experience any of the following:  redness, tenderness, or signs of infection (pain, swelling, redness, odor or green/yellow discharge around incision site)     Activity as tolerated        TIME SPENT ON DISCHARGE:   30 minutes

## 2023-05-17 ENCOUNTER — TELEPHONE (OUTPATIENT)
Dept: PAIN MEDICINE | Facility: CLINIC | Age: 58
End: 2023-05-17
Payer: COMMERCIAL

## 2023-05-17 ENCOUNTER — PATIENT MESSAGE (OUTPATIENT)
Dept: PAIN MEDICINE | Facility: CLINIC | Age: 58
End: 2023-05-17
Payer: COMMERCIAL

## 2023-05-17 VITALS
SYSTOLIC BLOOD PRESSURE: 138 MMHG | HEART RATE: 53 BPM | WEIGHT: 205 LBS | TEMPERATURE: 98 F | OXYGEN SATURATION: 93 % | BODY MASS INDEX: 27.17 KG/M2 | RESPIRATION RATE: 20 BRPM | DIASTOLIC BLOOD PRESSURE: 76 MMHG | HEIGHT: 73 IN

## 2023-05-17 NOTE — TELEPHONE ENCOUNTER
Please advise, MBB did not work and pt has been informed that narcotics are not appropriate  past 3 weeks. Pt does has appt with you 06/05 he did not wish to see Cara.

## 2023-05-18 RX ORDER — BACLOFEN 10 MG/1
10 TABLET ORAL 3 TIMES DAILY
Qty: 90 TABLET | Refills: 1 | Status: SHIPPED | OUTPATIENT
Start: 2023-05-18 | End: 2023-06-12 | Stop reason: SDUPTHER

## 2023-05-19 DIAGNOSIS — F41.9 ANXIETY: ICD-10-CM

## 2023-06-01 RX ORDER — BUSPIRONE HYDROCHLORIDE 7.5 MG/1
TABLET ORAL
Qty: 90 TABLET | Refills: 1 | Status: SHIPPED | OUTPATIENT
Start: 2023-06-01 | End: 2023-06-29

## 2023-06-01 NOTE — TELEPHONE ENCOUNTER
Refill Routing Note   Medication(s) are not appropriate for processing by [facility] for the following reason(s):      Medication outside of protocol    [facility] action(s):    None identified            Appointments  past 12m or future 3m with PCP    Date Provider   Last Visit   Visit date not found Sai Mccracken III, MD   Next Visit   5/29/2023 Sai Mccracken III, MD   ED visits in past 90 days: 0        Note composed:3:26 PM 06/01/2023

## 2023-06-12 RX ORDER — BACLOFEN 10 MG/1
TABLET ORAL
Qty: 90 TABLET | Refills: 1 | Status: SHIPPED | OUTPATIENT
Start: 2023-06-12

## 2023-06-19 DIAGNOSIS — F41.9 ANXIETY: ICD-10-CM

## 2023-06-19 RX ORDER — ESCITALOPRAM OXALATE 5 MG/1
TABLET ORAL
Qty: 90 TABLET | Refills: 0 | OUTPATIENT
Start: 2023-06-19

## 2023-06-19 NOTE — TELEPHONE ENCOUNTER
Refill Routing Note   Medication(s) are not appropriate for processing by [facility] for the following reason(s):      Patient not seen by PCP within 15 months  No active prescription written by PCP    [facility] action(s):  Defer None identified          Appointments  past 12m or future 3m with PCP    Date Provider   Last Visit   Visit date not found Sai Mccracken III, MD   Next Visit   Visit date not found Sai Mccracken III, MD   ED visits in past 90 days: 0        Note composed:5:14 PM 06/19/2023

## 2023-06-29 DIAGNOSIS — F41.9 ANXIETY: ICD-10-CM

## 2023-06-29 RX ORDER — BUSPIRONE HYDROCHLORIDE 7.5 MG/1
TABLET ORAL
Qty: 90 TABLET | Refills: 1 | Status: SHIPPED | OUTPATIENT
Start: 2023-06-29 | End: 2023-09-07

## 2023-07-06 ENCOUNTER — HOSPITAL ENCOUNTER (OUTPATIENT)
Facility: HOSPITAL | Age: 58
Discharge: HOME | End: 2023-07-06
Attending: INTERNAL MEDICINE | Admitting: INTERNAL MEDICINE
Payer: COMMERCIAL

## 2023-07-06 ENCOUNTER — ANESTHESIA EVENT (OUTPATIENT)
Dept: ENDOSCOPY | Facility: HOSPITAL | Age: 58
End: 2023-07-06
Payer: COMMERCIAL

## 2023-07-06 ENCOUNTER — ANESTHESIA (OUTPATIENT)
Dept: ENDOSCOPY | Facility: HOSPITAL | Age: 58
End: 2023-07-06
Payer: COMMERCIAL

## 2023-07-06 DIAGNOSIS — K22.10 EROSIVE ESOPHAGITIS: ICD-10-CM

## 2023-07-06 DIAGNOSIS — K44.9 HIATAL HERNIA: Primary | ICD-10-CM

## 2023-07-06 DIAGNOSIS — R10.9 ABDOMINAL PAIN: ICD-10-CM

## 2023-07-06 DIAGNOSIS — K22.2 SCHATZKI'S RING: ICD-10-CM

## 2023-07-06 PROCEDURE — D9220A PRA ANESTHESIA: Mod: CRNA,,, | Performed by: NURSE ANESTHETIST, CERTIFIED REGISTERED

## 2023-07-06 PROCEDURE — 63600175 PHARM REV CODE 636 W HCPCS: Performed by: NURSE ANESTHETIST, CERTIFIED REGISTERED

## 2023-07-06 PROCEDURE — 25000003 PHARM REV CODE 250: Performed by: NURSE ANESTHETIST, CERTIFIED REGISTERED

## 2023-07-06 PROCEDURE — D9220A PRA ANESTHESIA: ICD-10-PCS | Mod: CRNA,,, | Performed by: NURSE ANESTHETIST, CERTIFIED REGISTERED

## 2023-07-06 PROCEDURE — 37000009 HC ANESTHESIA EA ADD 15 MINS: Performed by: INTERNAL MEDICINE

## 2023-07-06 PROCEDURE — 43239 PR EGD, FLEX, W/BIOPSY, SGL/MULTI: ICD-10-PCS | Mod: ,,, | Performed by: INTERNAL MEDICINE

## 2023-07-06 PROCEDURE — 37000008 HC ANESTHESIA 1ST 15 MINUTES: Performed by: INTERNAL MEDICINE

## 2023-07-06 PROCEDURE — 25000003 PHARM REV CODE 250: Performed by: INTERNAL MEDICINE

## 2023-07-06 PROCEDURE — C1769 GUIDE WIRE: HCPCS | Performed by: INTERNAL MEDICINE

## 2023-07-06 PROCEDURE — 27201012 HC FORCEPS, HOT/COLD, DISP: Performed by: INTERNAL MEDICINE

## 2023-07-06 PROCEDURE — 43239 EGD BIOPSY SINGLE/MULTIPLE: CPT | Mod: ,,, | Performed by: INTERNAL MEDICINE

## 2023-07-06 PROCEDURE — 43239 EGD BIOPSY SINGLE/MULTIPLE: CPT | Performed by: INTERNAL MEDICINE

## 2023-07-06 PROCEDURE — D9220A PRA ANESTHESIA: Mod: ANES,,, | Performed by: ANESTHESIOLOGY

## 2023-07-06 PROCEDURE — D9220A PRA ANESTHESIA: ICD-10-PCS | Mod: ANES,,, | Performed by: ANESTHESIOLOGY

## 2023-07-06 RX ORDER — PANTOPRAZOLE SODIUM 40 MG/1
40 TABLET, DELAYED RELEASE ORAL 2 TIMES DAILY
Qty: 180 TABLET | Refills: 3 | Status: SHIPPED | OUTPATIENT
Start: 2023-07-06 | End: 2024-07-05

## 2023-07-06 RX ORDER — PROPOFOL 10 MG/ML
VIAL (ML) INTRAVENOUS
Status: DISCONTINUED | OUTPATIENT
Start: 2023-07-06 | End: 2023-07-06

## 2023-07-06 RX ORDER — LIDOCAINE HYDROCHLORIDE 20 MG/ML
INJECTION INTRAVENOUS
Status: DISCONTINUED | OUTPATIENT
Start: 2023-07-06 | End: 2023-07-06

## 2023-07-06 RX ORDER — SODIUM CHLORIDE 9 MG/ML
INJECTION, SOLUTION INTRAVENOUS CONTINUOUS
Status: DISCONTINUED | OUTPATIENT
Start: 2023-07-06 | End: 2023-07-06 | Stop reason: HOSPADM

## 2023-07-06 RX ORDER — SUCRALFATE 1 G/10ML
1 SUSPENSION ORAL 4 TIMES DAILY
Qty: 400 ML | Refills: 0 | Status: SHIPPED | OUTPATIENT
Start: 2023-07-06 | End: 2023-07-16

## 2023-07-06 RX ADMIN — PROPOFOL 40 MG: 10 INJECTION, EMULSION INTRAVENOUS at 11:07

## 2023-07-06 RX ADMIN — PROPOFOL 130 MG: 10 INJECTION, EMULSION INTRAVENOUS at 11:07

## 2023-07-06 RX ADMIN — SODIUM CHLORIDE: 9 INJECTION, SOLUTION INTRAVENOUS at 09:07

## 2023-07-06 RX ADMIN — PROPOFOL 30 MG: 10 INJECTION, EMULSION INTRAVENOUS at 11:07

## 2023-07-06 RX ADMIN — LIDOCAINE HYDROCHLORIDE 50 MG: 20 INJECTION, SOLUTION INTRAVENOUS at 11:07

## 2023-07-06 NOTE — ANESTHESIA PREPROCEDURE EVALUATION
07/06/2023  Jerod Barrientos is a 58 y.o., male.      Pre-op Assessment    I have reviewed the Patient Summary Reports.     I have reviewed the Nursing Notes. I have reviewed the NPO Status.   I have reviewed the Medications.     Review of Systems  Anesthesia Hx:  No problems with previous Anesthesia    Social:  Former Smoker    Hematology/Oncology:  Hematology Normal   Oncology Normal     EENT/Dental:EENT/Dental Normal   Cardiovascular:   Hypertension    Pulmonary:   Sleep Apnea    Neurological:   Neuromuscular Disease,    Psych:   anxiety          Physical Exam  General: Well nourished, Cooperative, Alert and Oriented    Airway:  Mallampati: II   Mouth Opening: Normal  TM Distance: Normal  Tongue: Normal  Neck ROM: Normal ROM    Dental:  Dentures        Anesthesia Plan  Type of Anesthesia, risks & benefits discussed:    Anesthesia Type: Gen Natural Airway  Intra-op Monitoring Plan: Standard ASA Monitors  Induction:  IV  Informed Consent: Informed consent signed with the Patient and all parties understand the risks and agree with anesthesia plan.  All questions answered.   ASA Score: 2    Ready For Surgery From Anesthesia Perspective.     .

## 2023-07-06 NOTE — TRANSFER OF CARE
Anesthesia Transfer of Care Note    Patient: Jerod Barrientos    Procedure(s) Performed: Procedure(s) (LRB):  EGD (ESOPHAGOGASTRODUODENOSCOPY) (N/A)    Patient location: GI    Anesthesia Type: general    Transport from OR: Transported from OR on 2-3 L/min O2 by NC with adequate spontaneous ventilation    Post pain: adequate analgesia    Post assessment: no apparent anesthetic complications    Post vital signs: stable    Level of consciousness: awake    Nausea/Vomiting: no nausea/vomiting    Complications: none    Transfer of care protocol was followed      Last vitals:   Visit Vitals  /72 (BP Location: Left arm, Patient Position: Lying)   Pulse 61   Temp 36.8 °C (98.2 °F) (Skin)   Resp 16   Ht 6' (1.829 m)   Wt 93 kg (205 lb)   SpO2 97%   BMI 27.80 kg/m²

## 2023-07-06 NOTE — H&P
CC: Epigastric pain, black stool    58 year old male with above. States that symptoms are stable, no alleviating/exacerbating factors. No active bleeding or weight loss.     ROS:  No headache, no fever/chills, no chest pain/SOB, no nausea/vomiting/diarrhea/constipation/GI bleeding/abdominal pain, no dysuria/hematuria.    VSSAF   Exam:   Alert and oriented x 3; no apparent distress   PERRLA, sclera anicteric  CV: Regular rate/rhythm, normal PMI   Lungs: Clear bilaterally with no wheeze/rales   Abdomen: Soft, NT/ND, normal bowel sounds   Ext: No cyanosis, clubbing     Impression:   As above    Plan:   Proceed with endoscopy. Further recs to follow.

## 2023-07-06 NOTE — PR[employer]ATION PATIENT INSTRUCTIONS
Discharge Summary/Instructions after an Endoscopic Procedure  Patient Name: Jerod Penaloza  Patient MRN: [id number]  Patient YOB: 1965  Thursday, July 6, 2023  Yunior Diallo MD  Dear patient,  As a result of recent federal legislation (The Federal Cures Act), you may   receive lab or pathology results from your procedure in your [facility]   account before your physician is able to contact you. Your physician or   their representative will relay the results to you with their   recommendations at their soonest availability.  Thank you,  RESTRICTIONS:  During your procedure today, you received medications for sedation.  These   medications may affect your judgment, balance and coordination.  Therefore,   for 24 hours, you have the following restrictions:   - DO NOT drive a car, operate machinery, make legal/financial decisions,   sign important papers or drink alcohol.    ACTIVITY:  Today: no heavy lifting, straining or running due to procedural   sedation/anesthesia.  The following day: return to full activity including work.  DIET:  Eat and drink normally unless instructed otherwise.     TREATMENT FOR COMMON SIDE EFFECTS:  - Mild abdominal pain, nausea, belching, bloating or excessive gas:  rest,   eat lightly and use a heating pad.  - Sore Throat: treat with throat lozenges and/or gargle with warm salt   water.  - Because air was used during the procedure, expelling large amounts of air   from your rectum or belching is normal.  - If a bowel prep was taken, you may not have a bowel movement for 1-3 days.    This is normal.  SYMPTOMS TO WATCH FOR AND REPORT TO YOUR PHYSICIAN:  1. Abdominal pain or bloating, other than gas cramps.  2. Chest pain.  3. Back pain.  4. Signs of infection such as: chills or fever occurring within 24 hours   after the procedure.  5. Rectal bleeding, which would show as bright red, maroon, or black stools.   (A tablespoon of blood from the rectum is not serious, especially if    hemorrhoids are present.)  6. Vomiting.  7. Weakness or dizziness.  GO DIRECTLY TO THE NEAREST EMERGENCY ROOM IF YOU HAVE ANY OF THE FOLLOWING:      Difficulty breathing              Chills and/or fever over 101 F   Persistent vomiting and/or vomiting blood   Severe abdominal pain   Severe chest pain   Black, tarry stools   Bleeding- more than one tablespoon   Any other symptom or condition that you feel may need urgent attention  Your doctor recommends these additional instructions:  If any biopsies were taken, your doctors clinic will contact you in 1 to 2   weeks with any results.  - Patient has a contact number available for emergencies.  The signs and   symptoms of potential delayed complications were discussed with the   patient.  Return to normal activities tomorrow.  Written discharge   instructions were provided to the patient.   - Resume previous diet.   - Continue present medications.   - No aspirin, ibuprofen, naproxen, or other non-steroidal anti-inflammatory   drugs.   - Await pathology results.   - Discharge patient to home (ambulatory).   - Follow an antireflux regimen.   - Use Protonix (pantoprazole) 40 mg PO BID.   - Use sucralfate suspension 1 gram PO QID.   - Return to GI office after studies are complete.  For questions, problems or results please call your physician - Yunior Diallo MD at Work:  [phone].  [facility], EMERGENCY ROOM PHONE NUMBER: [phone]  IF A COMPLICATION OR EMERGENCY SITUATION ARISES AND YOU ARE UNABLE TO REACH   YOUR PHYSICIAN - GO DIRECTLY TO THE EMERGENCY ROOM.  Yunior Diallo MD  7/6/2023 11:20:28 AM  This report has been verified and signed electronically.  Dear patient,  As a result of recent federal legislation (The Federal Cures Act), you may   receive lab or pathology results from your procedure in your [facility]   account before your physician is able to contact you. Your physician or   their representative will relay the results to you with  their   recommendations at their soonest availability.  Thank you,  [employer]ATION

## 2023-07-07 VITALS
HEART RATE: 51 BPM | WEIGHT: 205 LBS | RESPIRATION RATE: 18 BRPM | SYSTOLIC BLOOD PRESSURE: 119 MMHG | TEMPERATURE: 98 F | DIASTOLIC BLOOD PRESSURE: 77 MMHG | HEIGHT: 72 IN | OXYGEN SATURATION: 97 % | BODY MASS INDEX: 27.77 KG/M2

## 2023-08-03 ENCOUNTER — OFFICE VISIT (OUTPATIENT)
Dept: FAMILY MEDICINE | Facility: CLINIC | Age: 58
End: 2023-08-03
Payer: COMMERCIAL

## 2023-08-03 VITALS
BODY MASS INDEX: 28.16 KG/M2 | HEIGHT: 72 IN | TEMPERATURE: 98 F | WEIGHT: 207.88 LBS | OXYGEN SATURATION: 96 % | SYSTOLIC BLOOD PRESSURE: 124 MMHG | HEART RATE: 65 BPM | DIASTOLIC BLOOD PRESSURE: 72 MMHG

## 2023-08-03 DIAGNOSIS — Z98.1 S/P CERVICAL SPINAL FUSION: ICD-10-CM

## 2023-08-03 DIAGNOSIS — G89.29 OTHER CHRONIC PAIN: ICD-10-CM

## 2023-08-03 DIAGNOSIS — F41.0 PANIC: ICD-10-CM

## 2023-08-03 DIAGNOSIS — K43.9 VENTRAL HERNIA WITHOUT OBSTRUCTION OR GANGRENE: ICD-10-CM

## 2023-08-03 DIAGNOSIS — F33.9 DEPRESSION, RECURRENT: ICD-10-CM

## 2023-08-03 DIAGNOSIS — G47.33 OSA ON CPAP: ICD-10-CM

## 2023-08-03 DIAGNOSIS — K57.20 DIVERTICULITIS OF LARGE INTESTINE WITH ABSCESS WITHOUT BLEEDING: ICD-10-CM

## 2023-08-03 DIAGNOSIS — K63.5 POLYP OF COLON, UNSPECIFIED PART OF COLON, UNSPECIFIED TYPE: ICD-10-CM

## 2023-08-03 DIAGNOSIS — I10 HYPERTENSION, ESSENTIAL: Primary | ICD-10-CM

## 2023-08-03 DIAGNOSIS — K42.9 UMBILICAL HERNIA WITHOUT OBSTRUCTION AND WITHOUT GANGRENE: ICD-10-CM

## 2023-08-03 DIAGNOSIS — F98.8 ATTENTION DEFICIT DISORDER, UNSPECIFIED HYPERACTIVITY PRESENCE: ICD-10-CM

## 2023-08-03 DIAGNOSIS — F41.9 ANXIETY: ICD-10-CM

## 2023-08-03 DIAGNOSIS — F17.210 SMOKES WITH GREATER THAN 30 PACK YEAR HISTORY: ICD-10-CM

## 2023-08-03 PROCEDURE — 1160F RVW MEDS BY RX/DR IN RCRD: CPT | Mod: CPTII,S$GLB,, | Performed by: FAMILY MEDICINE

## 2023-08-03 PROCEDURE — 3078F DIAST BP <80 MM HG: CPT | Mod: CPTII,S$GLB,, | Performed by: FAMILY MEDICINE

## 2023-08-03 PROCEDURE — 1159F PR MEDICATION LIST DOCUMENTED IN MEDICAL RECORD: ICD-10-PCS | Mod: CPTII,S$GLB,, | Performed by: FAMILY MEDICINE

## 2023-08-03 PROCEDURE — 3008F PR BODY MASS INDEX (BMI) DOCUMENTED: ICD-10-PCS | Mod: CPTII,S$GLB,, | Performed by: FAMILY MEDICINE

## 2023-08-03 PROCEDURE — 3078F PR MOST RECENT DIASTOLIC BLOOD PRESSURE < 80 MM HG: ICD-10-PCS | Mod: CPTII,S$GLB,, | Performed by: FAMILY MEDICINE

## 2023-08-03 PROCEDURE — 4010F ACE/ARB THERAPY RXD/TAKEN: CPT | Mod: CPTII,S$GLB,, | Performed by: FAMILY MEDICINE

## 2023-08-03 PROCEDURE — 1159F MED LIST DOCD IN RCRD: CPT | Mod: CPTII,S$GLB,, | Performed by: FAMILY MEDICINE

## 2023-08-03 PROCEDURE — 3074F SYST BP LT 130 MM HG: CPT | Mod: CPTII,S$GLB,, | Performed by: FAMILY MEDICINE

## 2023-08-03 PROCEDURE — 3044F PR MOST RECENT HEMOGLOBIN A1C LEVEL <7.0%: ICD-10-PCS | Mod: CPTII,S$GLB,, | Performed by: FAMILY MEDICINE

## 2023-08-03 PROCEDURE — 3044F HG A1C LEVEL LT 7.0%: CPT | Mod: CPTII,S$GLB,, | Performed by: FAMILY MEDICINE

## 2023-08-03 PROCEDURE — 99214 PR OFFICE/OUTPT VISIT, EST, LEVL IV, 30-39 MIN: ICD-10-PCS | Mod: S$GLB,,, | Performed by: FAMILY MEDICINE

## 2023-08-03 PROCEDURE — 1160F PR REVIEW ALL MEDS BY PRESCRIBER/CLIN PHARMACIST DOCUMENTED: ICD-10-PCS | Mod: CPTII,S$GLB,, | Performed by: FAMILY MEDICINE

## 2023-08-03 PROCEDURE — 3074F PR MOST RECENT SYSTOLIC BLOOD PRESSURE < 130 MM HG: ICD-10-PCS | Mod: CPTII,S$GLB,, | Performed by: FAMILY MEDICINE

## 2023-08-03 PROCEDURE — 3008F BODY MASS INDEX DOCD: CPT | Mod: CPTII,S$GLB,, | Performed by: FAMILY MEDICINE

## 2023-08-03 PROCEDURE — 4010F PR ACE/ARB THEARPY RXD/TAKEN: ICD-10-PCS | Mod: CPTII,S$GLB,, | Performed by: FAMILY MEDICINE

## 2023-08-03 PROCEDURE — 99214 OFFICE O/P EST MOD 30 MIN: CPT | Mod: S$GLB,,, | Performed by: FAMILY MEDICINE

## 2023-08-03 RX ORDER — LAMOTRIGINE 25 MG/1
25 TABLET ORAL 2 TIMES DAILY
Qty: 60 TABLET | Refills: 1 | Status: SHIPPED | OUTPATIENT
Start: 2023-08-03 | End: 2023-09-07

## 2023-08-03 RX ORDER — ESCITALOPRAM OXALATE 10 MG/1
10 TABLET ORAL DAILY
Qty: 30 TABLET | Refills: 2 | Status: SHIPPED | OUTPATIENT
Start: 2023-08-03 | End: 2023-09-29 | Stop reason: DRUGHIGH

## 2023-08-03 RX ORDER — LAMOTRIGINE 25 MG/1
25 TABLET ORAL DAILY
COMMUNITY
End: 2023-08-03 | Stop reason: SDUPTHER

## 2023-08-04 NOTE — PROGRESS NOTES
Subjective:       Patient ID: Jerod Barrientos is a 58 y.o. male.    Chief Complaint: Follow-up    New patient to me.  Multiple issues today.  Feels he may have ADD.  History of problems in school as a child.  Jumps from task to task without finishing.  Cervical fusion in the past.  Tonsillectomy in the past.  Hypertension which is controlled.  Has had diverticulitis.  Colon polyp.  Repeat colonoscopy in 3 years done in April of this year will be due in 2026.  Chronic pain on hydrocodone and Lyrica.  Has ventral hernia and umbilical hernia.  No major discomfort with them.  Has anxiety and panic issues anger and aggression issues been going on for few years now.  Obstructive sleep apnea using his CPAP regularly.  BMI is 38.    Social history former smoker pack per day.  Quit for 5 years now.  Works as a .  Does she rock work.    Family history mother lung cancer.  Father  in his sleep.  Siblings unknown health.    Physical examination.  Vital signs noted.  No acute distress mood and affect normal.  Neck is supple without bruit.  Chest clear.  Heart regular rate and rhythm.  Abdomen bowel sounds are positive soft nontender umbilical hernia and another hernia up above this into the right.  Both are nontender.  Extremities without edema 2+ pedal pulses.          Objective:        Assessment:       1. Hypertension, essential    2. Diverticulitis of large intestine with abscess without bleeding    3. Polyp of colon, unspecified part of colon, unspecified type    4. S/P cervical spinal fusion    5. Other chronic pain    6. Anxiety    7. Panic    8. BERNARD on CPAP    9. Attention deficit disorder, unspecified hyperactivity presence    10. BMI 28.0-28.9,adult    11. Ventral hernia without obstruction or gangrene    12. Umbilical hernia without obstruction and without gangrene    13. Smokes with greater than 30 pack year history    14. Depression, recurrent        Plan:       Hypertension, essential  -      Lipid Panel; Future; Expected date: 08/03/2023  -     Comprehensive Metabolic Panel; Future; Expected date: 08/03/2023  -     Hemoglobin A1C; Future; Expected date: 08/03/2023    Diverticulitis of large intestine with abscess without bleeding    Polyp of colon, unspecified part of colon, unspecified type    S/P cervical spinal fusion    Other chronic pain    Anxiety  -     TSH; Future; Expected date: 08/03/2023    Panic  -     TSH; Future; Expected date: 08/03/2023    BERNARD on CPAP    Attention deficit disorder, unspecified hyperactivity presence    BMI 28.0-28.9,adult    Ventral hernia without obstruction or gangrene    Umbilical hernia without obstruction and without gangrene    Smokes with greater than 30 pack year history  -     CT Chest Lung Screening Low Dose; Future; Expected date: 08/03/2023    Depression, recurrent    Other orders  -     EScitalopram oxalate (LEXAPRO) 10 MG tablet; Take 1 tablet (10 mg total) by mouth once daily.  Dispense: 30 tablet; Refill: 2  -     lamoTRIgine (LAMICTAL) 25 MG tablet; Take 1 tablet (25 mg total) by mouth 2 (two) times daily.  Dispense: 60 tablet; Refill: 1      See General surgery regarding the hernias.  CT of the chest low-dose screening.  Lipid CMP TSH A1c.  Increase his Lexapro to 10 mg daily discontinue the BuSpar.  Add Lamictal 25 b.i.d. 60 pills with a refill.  Follow-up in 3 weeks.

## 2023-08-23 ENCOUNTER — HOSPITAL ENCOUNTER (OUTPATIENT)
Dept: RADIOLOGY | Facility: HOSPITAL | Age: 58
Discharge: HOME | End: 2023-08-23
Attending: FAMILY MEDICINE
Payer: COMMERCIAL

## 2023-08-23 DIAGNOSIS — F17.210 SMOKES WITH GREATER THAN 30 PACK YEAR HISTORY: ICD-10-CM

## 2023-08-23 PROCEDURE — 71271 CT THORAX LUNG CANCER SCR C-: CPT | Mod: TC,PO

## 2023-08-24 ENCOUNTER — LAB VISIT (OUTPATIENT)
Dept: LAB | Facility: HOSPITAL | Age: 58
End: 2023-08-24
Attending: FAMILY MEDICINE
Payer: COMMERCIAL

## 2023-08-24 DIAGNOSIS — I10 HYPERTENSION, ESSENTIAL: ICD-10-CM

## 2023-08-24 DIAGNOSIS — F41.0 PANIC: ICD-10-CM

## 2023-08-24 DIAGNOSIS — F41.9 ANXIETY: ICD-10-CM

## 2023-08-24 LAB
ALBUMIN SERPL BCP-MCNC: 3.9 G/DL (ref 3.5–5.2)
ALP SERPL-CCNC: 91 U/L (ref 55–135)
ALT SERPL W/O P-5'-P-CCNC: 16 U/L (ref 10–44)
ANION GAP SERPL CALC-SCNC: 9 MMOL/L (ref 8–16)
AST SERPL-CCNC: 13 U/L (ref 10–40)
BILIRUB SERPL-MCNC: 0.4 MG/DL (ref 0.1–1)
BUN SERPL-MCNC: 12 MG/DL (ref 6–20)
CALCIUM SERPL-MCNC: 9.1 MG/DL (ref 8.7–10.5)
CHLORIDE SERPL-SCNC: 106 MMOL/L (ref 95–110)
CHOLEST SERPL-MCNC: 195 MG/DL (ref 120–199)
CHOLEST/HDLC SERPL: 5.6 {RATIO} (ref 2–5)
CO2 SERPL-SCNC: 25 MMOL/L (ref 23–29)
CREAT SERPL-MCNC: 0.7 MG/DL (ref 0.5–1.4)
EST. GFR  (NO RACE VARIABLE): >60 ML/MIN/1.73 M^2
ESTIMATED AVG GLUCOSE: 94 MG/DL (ref 68–131)
GLUCOSE SERPL-MCNC: 93 MG/DL (ref 70–110)
HBA1C MFR BLD: 4.9 % (ref 4–5.6)
HDLC SERPL-MCNC: 35 MG/DL (ref 40–75)
HDLC SERPL: 17.9 % (ref 20–50)
LDLC SERPL CALC-MCNC: 137.6 MG/DL (ref 63–159)
NONHDLC SERPL-MCNC: 160 MG/DL
POTASSIUM SERPL-SCNC: 4.1 MMOL/L (ref 3.5–5.1)
PROT SERPL-MCNC: 6.6 G/DL (ref 6–8.4)
SODIUM SERPL-SCNC: 140 MMOL/L (ref 136–145)
TRIGL SERPL-MCNC: 112 MG/DL (ref 30–150)
TSH SERPL DL<=0.005 MIU/L-ACNC: 1.86 UIU/ML (ref 0.4–4)

## 2023-08-24 PROCEDURE — 83036 HEMOGLOBIN GLYCOSYLATED A1C: CPT | Performed by: FAMILY MEDICINE

## 2023-08-24 PROCEDURE — 80061 LIPID PANEL: CPT | Performed by: FAMILY MEDICINE

## 2023-08-24 PROCEDURE — 80053 COMPREHEN METABOLIC PANEL: CPT | Performed by: FAMILY MEDICINE

## 2023-08-24 PROCEDURE — 84443 ASSAY THYROID STIM HORMONE: CPT | Performed by: FAMILY MEDICINE

## 2023-08-24 PROCEDURE — 36415 COLL VENOUS BLD VENIPUNCTURE: CPT | Mod: PO | Performed by: FAMILY MEDICINE

## 2023-09-07 ENCOUNTER — OFFICE VISIT (OUTPATIENT)
Dept: FAMILY MEDICINE | Facility: CLINIC | Age: 58
End: 2023-09-07
Payer: COMMERCIAL

## 2023-09-07 VITALS
BODY MASS INDEX: 28.66 KG/M2 | TEMPERATURE: 98 F | HEIGHT: 72 IN | DIASTOLIC BLOOD PRESSURE: 68 MMHG | OXYGEN SATURATION: 95 % | SYSTOLIC BLOOD PRESSURE: 120 MMHG | WEIGHT: 211.63 LBS | HEART RATE: 59 BPM

## 2023-09-07 DIAGNOSIS — G47.33 OSA ON CPAP: ICD-10-CM

## 2023-09-07 DIAGNOSIS — F41.0 PANIC: ICD-10-CM

## 2023-09-07 DIAGNOSIS — R91.1 PULMONARY NODULE: ICD-10-CM

## 2023-09-07 DIAGNOSIS — R91.1 SOLITARY PULMONARY NODULE: ICD-10-CM

## 2023-09-07 DIAGNOSIS — I10 HYPERTENSION, ESSENTIAL: Primary | ICD-10-CM

## 2023-09-07 DIAGNOSIS — I25.10 ASCVD (ARTERIOSCLEROTIC CARDIOVASCULAR DISEASE): ICD-10-CM

## 2023-09-07 DIAGNOSIS — F41.9 ANXIETY: ICD-10-CM

## 2023-09-07 PROCEDURE — 3008F BODY MASS INDEX DOCD: CPT | Mod: CPTII,S$GLB,, | Performed by: FAMILY MEDICINE

## 2023-09-07 PROCEDURE — 4010F ACE/ARB THERAPY RXD/TAKEN: CPT | Mod: CPTII,S$GLB,, | Performed by: FAMILY MEDICINE

## 2023-09-07 PROCEDURE — 4010F PR ACE/ARB THEARPY RXD/TAKEN: ICD-10-PCS | Mod: CPTII,S$GLB,, | Performed by: FAMILY MEDICINE

## 2023-09-07 PROCEDURE — 1159F PR MEDICATION LIST DOCUMENTED IN MEDICAL RECORD: ICD-10-PCS | Mod: CPTII,S$GLB,, | Performed by: FAMILY MEDICINE

## 2023-09-07 PROCEDURE — 99214 OFFICE O/P EST MOD 30 MIN: CPT | Mod: S$GLB,,, | Performed by: FAMILY MEDICINE

## 2023-09-07 PROCEDURE — 3044F PR MOST RECENT HEMOGLOBIN A1C LEVEL <7.0%: ICD-10-PCS | Mod: CPTII,S$GLB,, | Performed by: FAMILY MEDICINE

## 2023-09-07 PROCEDURE — 3074F PR MOST RECENT SYSTOLIC BLOOD PRESSURE < 130 MM HG: ICD-10-PCS | Mod: CPTII,S$GLB,, | Performed by: FAMILY MEDICINE

## 2023-09-07 PROCEDURE — 3074F SYST BP LT 130 MM HG: CPT | Mod: CPTII,S$GLB,, | Performed by: FAMILY MEDICINE

## 2023-09-07 PROCEDURE — 1160F PR REVIEW ALL MEDS BY PRESCRIBER/CLIN PHARMACIST DOCUMENTED: ICD-10-PCS | Mod: CPTII,S$GLB,, | Performed by: FAMILY MEDICINE

## 2023-09-07 PROCEDURE — 3078F DIAST BP <80 MM HG: CPT | Mod: CPTII,S$GLB,, | Performed by: FAMILY MEDICINE

## 2023-09-07 PROCEDURE — 3008F PR BODY MASS INDEX (BMI) DOCUMENTED: ICD-10-PCS | Mod: CPTII,S$GLB,, | Performed by: FAMILY MEDICINE

## 2023-09-07 PROCEDURE — 3078F PR MOST RECENT DIASTOLIC BLOOD PRESSURE < 80 MM HG: ICD-10-PCS | Mod: CPTII,S$GLB,, | Performed by: FAMILY MEDICINE

## 2023-09-07 PROCEDURE — 99214 PR OFFICE/OUTPT VISIT, EST, LEVL IV, 30-39 MIN: ICD-10-PCS | Mod: S$GLB,,, | Performed by: FAMILY MEDICINE

## 2023-09-07 PROCEDURE — 1159F MED LIST DOCD IN RCRD: CPT | Mod: CPTII,S$GLB,, | Performed by: FAMILY MEDICINE

## 2023-09-07 PROCEDURE — 3044F HG A1C LEVEL LT 7.0%: CPT | Mod: CPTII,S$GLB,, | Performed by: FAMILY MEDICINE

## 2023-09-07 PROCEDURE — 1160F RVW MEDS BY RX/DR IN RCRD: CPT | Mod: CPTII,S$GLB,, | Performed by: FAMILY MEDICINE

## 2023-09-07 RX ORDER — PREGABALIN 150 MG/1
150 CAPSULE ORAL
COMMUNITY
Start: 2023-08-18

## 2023-09-07 RX ORDER — LAMOTRIGINE 25 MG/1
50 TABLET ORAL 2 TIMES DAILY
Qty: 120 TABLET | Refills: 1 | Status: SHIPPED | OUTPATIENT
Start: 2023-09-07 | End: 2023-10-30

## 2023-09-07 RX ORDER — CELECOXIB 200 MG/1
200 CAPSULE ORAL 2 TIMES DAILY
COMMUNITY
Start: 2023-08-16

## 2023-09-07 RX ORDER — HYDROCODONE BITARTRATE AND ACETAMINOPHEN 7.5; 325 MG/1; MG/1
1 TABLET ORAL
COMMUNITY
Start: 2023-08-19

## 2023-09-10 NOTE — PROGRESS NOTES
Subjective:       Patient ID: Jerod Barrientos is a 58 y.o. male.    Chief Complaint: Follow-up    Hypertension is doing okay.  Her surgery done.  Follow-up next week.  Anxiety and panic.  No better.  Blows up mood swings.  BMI is 28.7 up 4 lb.  Pulmonary nodule 7 mm right upper lobe checked August 23rd repeat will need to be done in February.  Sound in years a whooshing sound.  No earache.  Does not hear at all the time.  ASCVD.  Obstructive sleep apnea on his CPAP.  TSH 1.86.  A1c 4.9 CMP normal cholesterol 195 HDL 35 and LDL of 138.    Physical examination.  Alert oriented.  Tympanic membranes without any erythema.  Does have carotid bruit.  Chest clear.  Heart regular rate rhythm.  Extremities without edema positive pulses.        Objective:        Assessment:       1. Hypertension, essential    2. BERNARD on CPAP    3. Anxiety    4. Panic    5. BMI 28.0-28.9,adult    6. Pulmonary nodule    7. ASCVD (arteriosclerotic cardiovascular disease)    8. Solitary pulmonary nodule        Plan:       Hypertension, essential    BERNARD on CPAP    Anxiety    Panic    BMI 28.0-28.9,adult    Pulmonary nodule    ASCVD (arteriosclerotic cardiovascular disease)  -     US Carotid Bilateral; Future; Expected date: 09/07/2023    Solitary pulmonary nodule  -     CT Chest Without Contrast; Future; Expected date: 09/07/2023    Other orders  -     lamoTRIgine (LAMICTAL) 25 MG tablet; Take 2 tablets (50 mg total) by mouth 2 (two) times daily.  Dispense: 120 tablet; Refill: 1    CT chest in February.  Increase Lamictal to 50 mg b.i.d. to 25 twice a day.  Get a carotid ultrasound.  Problems with his CPAP mask fit.  He does not know what company he uses for supplies.  He will have to look this up when he gets home and contact them about getting an alternate mask.  Follow-up in a month.

## 2023-09-15 ENCOUNTER — HOSPITAL ENCOUNTER (OUTPATIENT)
Dept: RADIOLOGY | Facility: HOSPITAL | Age: 58
Discharge: HOME | End: 2023-09-15
Attending: FAMILY MEDICINE
Payer: COMMERCIAL

## 2023-09-15 DIAGNOSIS — I10 ESSENTIAL HYPERTENSION: ICD-10-CM

## 2023-09-15 DIAGNOSIS — R91.1 SOLITARY PULMONARY NODULE: ICD-10-CM

## 2023-09-15 DIAGNOSIS — I25.10 ASCVD (ARTERIOSCLEROTIC CARDIOVASCULAR DISEASE): ICD-10-CM

## 2023-09-15 PROCEDURE — 71250 CT THORAX DX C-: CPT | Mod: TC

## 2023-09-15 PROCEDURE — 93880 EXTRACRANIAL BILAT STUDY: CPT | Mod: TC

## 2023-09-16 RX ORDER — AMLODIPINE BESYLATE 10 MG/1
10 TABLET ORAL
Qty: 90 TABLET | Refills: 1 | Status: SHIPPED | OUTPATIENT
Start: 2023-09-16

## 2023-09-20 ENCOUNTER — TELEPHONE (OUTPATIENT)
Dept: FAMILY MEDICINE | Facility: CLINIC | Age: 58
End: 2023-09-20
Payer: COMMERCIAL

## 2023-09-20 NOTE — TELEPHONE ENCOUNTER
----- Message from Sai Mccracken III, MD sent at 9/16/2023  8:38 PM CDT -----  FOLLOW-UP AS RECOMMENDED

## 2023-09-20 NOTE — TELEPHONE ENCOUNTER
----- Message from Sai Mccracken III, MD sent at 9/16/2023  8:54 PM CDT -----  I ordered ct to be done in feb  FOLLOW-UP AS RECOMMENDED

## 2023-09-27 ENCOUNTER — PATIENT MESSAGE (OUTPATIENT)
Dept: FAMILY MEDICINE | Facility: CLINIC | Age: 58
End: 2023-09-27
Payer: COMMERCIAL

## 2023-09-29 ENCOUNTER — OFFICE VISIT (OUTPATIENT)
Dept: FAMILY MEDICINE | Facility: CLINIC | Age: 58
End: 2023-09-29
Payer: COMMERCIAL

## 2023-09-29 VITALS
SYSTOLIC BLOOD PRESSURE: 132 MMHG | HEART RATE: 61 BPM | WEIGHT: 207.38 LBS | OXYGEN SATURATION: 96 % | DIASTOLIC BLOOD PRESSURE: 78 MMHG | HEIGHT: 72 IN | TEMPERATURE: 99 F | BODY MASS INDEX: 28.09 KG/M2

## 2023-09-29 DIAGNOSIS — G47.33 OSA ON CPAP: ICD-10-CM

## 2023-09-29 DIAGNOSIS — I10 HYPERTENSION, ESSENTIAL: Primary | ICD-10-CM

## 2023-09-29 DIAGNOSIS — F33.9 DEPRESSION, RECURRENT: ICD-10-CM

## 2023-09-29 PROCEDURE — 99213 OFFICE O/P EST LOW 20 MIN: CPT | Mod: S$GLB,,, | Performed by: FAMILY MEDICINE

## 2023-09-29 PROCEDURE — 1160F RVW MEDS BY RX/DR IN RCRD: CPT | Mod: CPTII,S$GLB,, | Performed by: FAMILY MEDICINE

## 2023-09-29 PROCEDURE — 3008F BODY MASS INDEX DOCD: CPT | Mod: CPTII,S$GLB,, | Performed by: FAMILY MEDICINE

## 2023-09-29 PROCEDURE — 3078F PR MOST RECENT DIASTOLIC BLOOD PRESSURE < 80 MM HG: ICD-10-PCS | Mod: CPTII,S$GLB,, | Performed by: FAMILY MEDICINE

## 2023-09-29 PROCEDURE — 4010F PR ACE/ARB THEARPY RXD/TAKEN: ICD-10-PCS | Mod: CPTII,S$GLB,, | Performed by: FAMILY MEDICINE

## 2023-09-29 PROCEDURE — 1160F PR REVIEW ALL MEDS BY PRESCRIBER/CLIN PHARMACIST DOCUMENTED: ICD-10-PCS | Mod: CPTII,S$GLB,, | Performed by: FAMILY MEDICINE

## 2023-09-29 PROCEDURE — 3075F PR MOST RECENT SYSTOLIC BLOOD PRESS GE 130-139MM HG: ICD-10-PCS | Mod: CPTII,S$GLB,, | Performed by: FAMILY MEDICINE

## 2023-09-29 PROCEDURE — 99213 PR OFFICE/OUTPT VISIT, EST, LEVL III, 20-29 MIN: ICD-10-PCS | Mod: S$GLB,,, | Performed by: FAMILY MEDICINE

## 2023-09-29 PROCEDURE — 1159F MED LIST DOCD IN RCRD: CPT | Mod: CPTII,S$GLB,, | Performed by: FAMILY MEDICINE

## 2023-09-29 PROCEDURE — 1159F PR MEDICATION LIST DOCUMENTED IN MEDICAL RECORD: ICD-10-PCS | Mod: CPTII,S$GLB,, | Performed by: FAMILY MEDICINE

## 2023-09-29 PROCEDURE — 3044F PR MOST RECENT HEMOGLOBIN A1C LEVEL <7.0%: ICD-10-PCS | Mod: CPTII,S$GLB,, | Performed by: FAMILY MEDICINE

## 2023-09-29 PROCEDURE — 3044F HG A1C LEVEL LT 7.0%: CPT | Mod: CPTII,S$GLB,, | Performed by: FAMILY MEDICINE

## 2023-09-29 PROCEDURE — 3075F SYST BP GE 130 - 139MM HG: CPT | Mod: CPTII,S$GLB,, | Performed by: FAMILY MEDICINE

## 2023-09-29 PROCEDURE — 3008F PR BODY MASS INDEX (BMI) DOCUMENTED: ICD-10-PCS | Mod: CPTII,S$GLB,, | Performed by: FAMILY MEDICINE

## 2023-09-29 PROCEDURE — 4010F ACE/ARB THERAPY RXD/TAKEN: CPT | Mod: CPTII,S$GLB,, | Performed by: FAMILY MEDICINE

## 2023-09-29 PROCEDURE — 3078F DIAST BP <80 MM HG: CPT | Mod: CPTII,S$GLB,, | Performed by: FAMILY MEDICINE

## 2023-09-29 RX ORDER — QUETIAPINE FUMARATE 50 MG/1
50 TABLET, FILM COATED ORAL NIGHTLY
Qty: 30 TABLET | Refills: 0 | Status: SHIPPED | OUTPATIENT
Start: 2023-09-29 | End: 2024-09-28

## 2023-09-29 RX ORDER — ESCITALOPRAM OXALATE 20 MG/1
20 TABLET ORAL DAILY
Qty: 30 TABLET | Refills: 2 | Status: SHIPPED | OUTPATIENT
Start: 2023-09-29 | End: 2024-09-28

## 2023-10-02 NOTE — PROGRESS NOTES
Subjective:       Patient ID: Jerod Barrientos is a 58 y.o. male.    Chief Complaint: Follow-up    Hypertension.  Blood pressure okay today.  Tremendous amount of stress secondary to conflict with neighbors and court case.  Carotid ultrasound was done it was okay.  Obstructive sleep apnea on CPAP.  Does not know the name of his supplier.  Depression.  Discontinue the BuSpar.  Anxiety over court.  That will be coming up in December.  Lexapro and Lamictal 25 b.i.d. increase to 2 b.i.d..  No improvement.  On edge temper flares.  Getting up few times per night.    Physical examination.  Vital signs noted.  Chest clear.  Heart regular rate rhythm.  Extremities without edema.  Mood and affect are normal at this time.        Objective:        Assessment:       1. Hypertension, essential    2. BERNARD on CPAP    3. Depression, recurrent        Plan:       Hypertension, essential    BERNARD on CPAP    Depression, recurrent    Other orders  -     QUEtiapine (SEROQUEL) 50 MG tablet; Take 1 tablet (50 mg total) by mouth every evening.  Dispense: 30 tablet; Refill: 0  -     EScitalopram oxalate (LEXAPRO) 20 MG tablet; Take 1 tablet (20 mg total) by mouth once daily.  Dispense: 30 tablet; Refill: 2      Same antihypertensive medication.  Stay off BuSpar.  Decrease Lamictal to 1 b.i.d. for 7 days then discontinue it.  Start Seroquel 50 mg HS 30 pills.  Increase Lexapro to 20 mg daily.  Follow-up in 1 month.

## 2023-10-23 NOTE — TELEPHONE ENCOUNTER
- Shara Bush. - Please call Dr. Berdie Cogan No care due was identified.  [facility] Embedded Care Due Messages. Reference number: [id number].   10/23/2023 3:18:31 PM CDT   [email or web address] Enter [phone] ([phone]) and [phone] ([phone]). 

## 2023-10-24 RX ORDER — ESCITALOPRAM OXALATE 10 MG/1
TABLET ORAL
Qty: 30 TABLET | Refills: 2 | OUTPATIENT
Start: 2023-10-24

## 2023-10-24 NOTE — TELEPHONE ENCOUNTER
Refill Decision Note   Jerod Barrientos  is requesting a refill authorization.  Brief Assessment and Rationale for Refill:  Quick Discontinue     Medication Therapy Plan:  discontinued on 9/29/2023 by Padma Jones MA for the following reason: Dose adjustment.    Medication Reconciliation Completed: No   Comments:     No Care Gaps recommended.     Note composed:10:46 AM 10/24/2023

## 2023-10-30 RX ORDER — LAMOTRIGINE 25 MG/1
50 TABLET ORAL 2 TIMES DAILY
Qty: 120 TABLET | Refills: 1 | Status: SHIPPED | OUTPATIENT
Start: 2023-10-30

## 2023-12-30 NOTE — TELEPHONE ENCOUNTER
No care due was identified.  [facility] Embedded Care Due Messages. Reference number: [id number].   12/30/2023 10:13:49 AM CST

## 2024-01-01 RX ORDER — ESCITALOPRAM OXALATE 20 MG/1
20 TABLET ORAL
Qty: 30 TABLET | Refills: 1 | OUTPATIENT
Start: 2024-01-01

## 2024-01-01 RX ORDER — LAMOTRIGINE 25 MG/1
50 TABLET ORAL 2 TIMES DAILY
Qty: 120 TABLET | Refills: 1 | OUTPATIENT
Start: 2024-01-01

## 2024-01-01 NOTE — TELEPHONE ENCOUNTER
Refill Routing Note   Medication(s) are not appropriate for processing by [facility] for the following reason(s):        Outside of protocol  New or recently adjusted medication    [facility] action(s):  Defer  Route               Appointments  past 12m or future 3m with PCP    Date Provider   Last Visit   9/29/2023 Sai Mccracken III, MD   Next Visit   Visit date not found Sai Mccracken III, MD   ED visits in past 90 days: 0        Note composed:12:56 AM 01/01/2024

## 2024-04-13 DIAGNOSIS — I10 ESSENTIAL HYPERTENSION: ICD-10-CM

## 2024-04-13 RX ORDER — AMLODIPINE BESYLATE 10 MG/1
10 TABLET ORAL
Qty: 90 TABLET | Refills: 1 | Status: SHIPPED | OUTPATIENT
Start: 2024-04-13

## 2024-04-13 NOTE — TELEPHONE ENCOUNTER
No care due was identified.  [facility] Embedded Care Due Messages. Reference number: [id number].   4/13/2024 12:48:42 AM CDT

## 2024-04-14 NOTE — TELEPHONE ENCOUNTER
Refill Decision Note   Jerod Floyd  is requesting a refill authorization.  Brief Assessment and Rationale for Refill:  Approve     Medication Therapy Plan:         Comments:     Note composed:8:01 PM 04/13/2024

## 2024-06-28 ENCOUNTER — HOSPITAL ENCOUNTER (OUTPATIENT)
Facility: HOSPITAL | Age: 59
Discharge: HOME | End: 2024-07-01
Attending: EMERGENCY MEDICINE | Admitting: INTERNAL MEDICINE
Payer: COMMERCIAL

## 2024-06-28 DIAGNOSIS — R07.9 CHEST PAIN: ICD-10-CM

## 2024-06-28 DIAGNOSIS — K92.2 GIB (GASTROINTESTINAL BLEEDING): ICD-10-CM

## 2024-06-28 DIAGNOSIS — D62 ACUTE BLOOD LOSS ANEMIA: Primary | ICD-10-CM

## 2024-06-28 PROBLEM — E44.0 MALNUTRITION OF MODERATE DEGREE: Status: ACTIVE | Noted: 2024-06-28

## 2024-06-28 LAB
ABO + RH BLD: NORMAL
ALBUMIN SERPL BCP-MCNC: 3.4 G/DL (ref 3.5–5.2)
ALP SERPL-CCNC: 77 U/L (ref 55–135)
ALT SERPL W/O P-5'-P-CCNC: 12 U/L (ref 10–44)
ANION GAP SERPL CALC-SCNC: 11 MMOL/L (ref 8–16)
AST SERPL-CCNC: 9 U/L (ref 10–40)
BASOPHILS # BLD AUTO: 0.04 K/UL (ref 0–0.2)
BASOPHILS NFR BLD: 0.4 % (ref 0–1.9)
BILIRUB SERPL-MCNC: 0.3 MG/DL (ref 0.1–1)
BLD GP AB SCN CELLS X3 SERPL QL: NORMAL
BNP SERPL-MCNC: <10 PG/ML (ref 0–99)
BUN SERPL-MCNC: 20 MG/DL (ref 6–20)
CALCIUM SERPL-MCNC: 8.7 MG/DL (ref 8.7–10.5)
CHLORIDE SERPL-SCNC: 106 MMOL/L (ref 95–110)
CO2 SERPL-SCNC: 21 MMOL/L (ref 23–29)
CREAT SERPL-MCNC: 0.8 MG/DL (ref 0.5–1.4)
DIFFERENTIAL METHOD BLD: ABNORMAL
EOSINOPHIL # BLD AUTO: 0.1 K/UL (ref 0–0.5)
EOSINOPHIL NFR BLD: 1 % (ref 0–8)
ERYTHROCYTE [DISTWIDTH] IN BLOOD BY AUTOMATED COUNT: 13.2 % (ref 11.5–14.5)
EST. GFR  (NO RACE VARIABLE): >60 ML/MIN/1.73 M^2
GLUCOSE SERPL-MCNC: 126 MG/DL (ref 70–110)
HCT VFR BLD AUTO: 29.2 % (ref 40–54)
HGB BLD-MCNC: 9.8 G/DL (ref 14–18)
IMM GRANULOCYTES # BLD AUTO: 0.19 K/UL (ref 0–0.04)
IMM GRANULOCYTES NFR BLD AUTO: 1.9 % (ref 0–0.5)
LIPASE SERPL-CCNC: 18 U/L (ref 4–60)
LYMPHOCYTES # BLD AUTO: 2.2 K/UL (ref 1–4.8)
LYMPHOCYTES NFR BLD: 21.6 % (ref 18–48)
MCH RBC QN AUTO: 31.9 PG (ref 27–31)
MCHC RBC AUTO-ENTMCNC: 33.6 G/DL (ref 32–36)
MCV RBC AUTO: 95 FL (ref 82–98)
MONOCYTES # BLD AUTO: 0.5 K/UL (ref 0.3–1)
MONOCYTES NFR BLD: 5.1 % (ref 4–15)
NEUTROPHILS # BLD AUTO: 7.1 K/UL (ref 1.8–7.7)
NEUTROPHILS NFR BLD: 70 % (ref 38–73)
NRBC BLD-RTO: 0 /100 WBC
PLATELET # BLD AUTO: 267 K/UL (ref 150–450)
PMV BLD AUTO: 9.5 FL (ref 9.2–12.9)
POTASSIUM SERPL-SCNC: 4.1 MMOL/L (ref 3.5–5.1)
PROT SERPL-MCNC: 5.7 G/DL (ref 6–8.4)
RBC # BLD AUTO: 3.07 M/UL (ref 4.6–6.2)
SODIUM SERPL-SCNC: 138 MMOL/L (ref 136–145)
SPECIMEN OUTDATE: NORMAL
TROPONIN I SERPL DL<=0.01 NG/ML-MCNC: <0.006 NG/ML (ref 0–0.03)
WBC # BLD AUTO: 10.13 K/UL (ref 3.9–12.7)

## 2024-06-28 PROCEDURE — 96376 TX/PRO/DX INJ SAME DRUG ADON: CPT | Mod: 59

## 2024-06-28 PROCEDURE — 85025 COMPLETE CBC W/AUTO DIFF WBC: CPT | Performed by: NURSE PRACTITIONER

## 2024-06-28 PROCEDURE — 63600175 PHARM REV CODE 636 W HCPCS: Performed by: EMERGENCY MEDICINE

## 2024-06-28 PROCEDURE — 86850 RBC ANTIBODY SCREEN: CPT | Performed by: NURSE PRACTITIONER

## 2024-06-28 PROCEDURE — C9113 INJ PANTOPRAZOLE SODIUM, VIA: HCPCS | Performed by: NURSE PRACTITIONER

## 2024-06-28 PROCEDURE — 93010 ELECTROCARDIOGRAM REPORT: CPT | Mod: ,,, | Performed by: INTERNAL MEDICINE

## 2024-06-28 PROCEDURE — 80053 COMPREHEN METABOLIC PANEL: CPT | Performed by: NURSE PRACTITIONER

## 2024-06-28 PROCEDURE — C9113 INJ PANTOPRAZOLE SODIUM, VIA: HCPCS | Performed by: EMERGENCY MEDICINE

## 2024-06-28 PROCEDURE — 94761 N-INVAS EAR/PLS OXIMETRY MLT: CPT

## 2024-06-28 PROCEDURE — G0378 HOSPITAL OBSERVATION PER HR: HCPCS

## 2024-06-28 PROCEDURE — 86901 BLOOD TYPING SEROLOGIC RH(D): CPT | Performed by: NURSE PRACTITIONER

## 2024-06-28 PROCEDURE — 99285 EMERGENCY DEPT VISIT HI MDM: CPT | Mod: 25

## 2024-06-28 PROCEDURE — 25500020 PHARM REV CODE 255

## 2024-06-28 PROCEDURE — 63600175 PHARM REV CODE 636 W HCPCS: Performed by: NURSE PRACTITIONER

## 2024-06-28 PROCEDURE — 36415 COLL VENOUS BLD VENIPUNCTURE: CPT | Performed by: NURSE PRACTITIONER

## 2024-06-28 PROCEDURE — 84484 ASSAY OF TROPONIN QUANT: CPT | Performed by: NURSE PRACTITIONER

## 2024-06-28 PROCEDURE — 96375 TX/PRO/DX INJ NEW DRUG ADDON: CPT | Mod: 59

## 2024-06-28 PROCEDURE — 93005 ELECTROCARDIOGRAM TRACING: CPT

## 2024-06-28 PROCEDURE — 96374 THER/PROPH/DIAG INJ IV PUSH: CPT

## 2024-06-28 PROCEDURE — 83880 ASSAY OF NATRIURETIC PEPTIDE: CPT | Performed by: NURSE PRACTITIONER

## 2024-06-28 PROCEDURE — 96361 HYDRATE IV INFUSION ADD-ON: CPT

## 2024-06-28 PROCEDURE — 83690 ASSAY OF LIPASE: CPT | Performed by: NURSE PRACTITIONER

## 2024-06-28 RX ORDER — SODIUM CHLORIDE, SODIUM LACTATE, POTASSIUM CHLORIDE, CALCIUM CHLORIDE 600; 310; 30; 20 MG/100ML; MG/100ML; MG/100ML; MG/100ML
INJECTION, SOLUTION INTRAVENOUS CONTINUOUS
Status: DISCONTINUED | OUTPATIENT
Start: 2024-06-28 | End: 2024-07-01 | Stop reason: HOSPADM

## 2024-06-28 RX ORDER — MORPHINE SULFATE 4 MG/ML
4 INJECTION, SOLUTION INTRAMUSCULAR; INTRAVENOUS
Status: COMPLETED | OUTPATIENT
Start: 2024-06-28 | End: 2024-06-28

## 2024-06-28 RX ORDER — TIZANIDINE 4 MG/1
4 TABLET ORAL 2 TIMES DAILY
Status: ON HOLD | COMMUNITY
Start: 2024-06-24 | End: 2024-07-06 | Stop reason: HOSPADM

## 2024-06-28 RX ORDER — OXYCODONE AND ACETAMINOPHEN 7.5; 325 MG/1; MG/1
1 TABLET ORAL 3 TIMES DAILY
COMMUNITY
Start: 2024-06-08

## 2024-06-28 RX ORDER — PANTOPRAZOLE SODIUM 40 MG/10ML
40 INJECTION, POWDER, LYOPHILIZED, FOR SOLUTION INTRAVENOUS
Status: COMPLETED | OUTPATIENT
Start: 2024-06-28 | End: 2024-06-28

## 2024-06-28 RX ORDER — PREGABALIN 200 MG/1
200 CAPSULE ORAL DAILY
Status: ON HOLD | COMMUNITY
Start: 2024-06-24 | End: 2024-07-06 | Stop reason: HOSPADM

## 2024-06-28 RX ORDER — METHYLPREDNISOLONE 4 MG/1
4 TABLET ORAL SEE ADMIN INSTRUCTIONS
COMMUNITY
Start: 2024-06-27 | End: 2024-07-10

## 2024-06-28 RX ORDER — PANTOPRAZOLE SODIUM 40 MG/10ML
40 INJECTION, POWDER, LYOPHILIZED, FOR SOLUTION INTRAVENOUS 2 TIMES DAILY
Status: DISCONTINUED | OUTPATIENT
Start: 2024-06-28 | End: 2024-07-01 | Stop reason: HOSPADM

## 2024-06-28 RX ADMIN — IOHEXOL 75 ML: 350 INJECTION, SOLUTION INTRAVENOUS at 06:06

## 2024-06-28 RX ADMIN — MORPHINE SULFATE 4 MG: 4 INJECTION INTRAVENOUS at 09:06

## 2024-06-28 RX ADMIN — MORPHINE SULFATE 4 MG: 4 INJECTION INTRAVENOUS at 06:06

## 2024-06-28 RX ADMIN — PANTOPRAZOLE SODIUM 40 MG: 40 INJECTION, POWDER, LYOPHILIZED, FOR SOLUTION INTRAVENOUS at 05:06

## 2024-06-28 RX ADMIN — SODIUM CHLORIDE, POTASSIUM CHLORIDE, SODIUM LACTATE AND CALCIUM CHLORIDE: 600; 310; 30; 20 INJECTION, SOLUTION INTRAVENOUS at 10:06

## 2024-06-28 RX ADMIN — PANTOPRAZOLE SODIUM 40 MG: 40 INJECTION, POWDER, LYOPHILIZED, FOR SOLUTION INTRAVENOUS at 09:06

## 2024-06-28 NOTE — CARE UPDATE
06/28/24 1616   Patient Assessment/Suction   Level of Consciousness (AVPU) alert   Respiratory Effort Unlabored;Normal   Expansion/Accessory Muscles/Retractions no use of accessory muscles;no retractions;expansion symmetric   Rhythm/Pattern, Respiratory unlabored;pattern regular;depth regular   PRE-TX-O2   Device (Oxygen Therapy) room air   SpO2 100 %   Pulse Oximetry Type Continuous   $ Pulse Oximetry - Multiple Charge Pulse Oximetry - Multiple   Pulse 78   Resp 20   /88

## 2024-06-28 NOTE — ED PROVIDER NOTES
Encounter Date: 6/28/2024       History     Chief Complaint   Patient presents with    Chest Pain     X 2 days, epigastric in nature.       Rectal Bleeding     X 3 days, maroon colored.      59-year-old male with a history of diverticulosis presents to the emergency room with 3 days of rectal bleeding.  He reports regular episodes of dark bloody stool.  He is also having epigastric and left lower quadrant pain.  He also reports chest pain with exertion and shortness of breath with exertion.  No chest pain or shortness of breath at rest.  History of diverticulosis but no history of GI bleed.    Secondhand smoke exposure.  No alcohol. Occ Goody's powder.    The history is provided by the patient and the spouse.     Review of patient's allergies indicates:  No Known Allergies  Past Medical History:   Diagnosis Date    Hypertension     Sleep apnea      Past Surgical History:   Procedure Laterality Date    COLONOSCOPY N/A 04/24/2023    Procedure: COLONOSCOPY;  Surgeon: Yunior Major MD;  Location: [facility];  Service: Endoscopy;  Laterality: N/A;  lm/ppm    ESOPHAGOGASTRODUODENOSCOPY  2012    ESOPHAGOGASTRODUODENOSCOPY N/A 7/6/2023    Procedure: EGD (ESOPHAGOGASTRODUODENOSCOPY);  Surgeon: Yunior Majro MD;  Location: [facility] ENDO;  Service: Endoscopy;  Laterality: N/A;    INJECTION OF ANESTHETIC AGENT AROUND MEDIAL BRANCH NERVES INNERVATING LUMBAR FACET JOINT Left 05/16/2023    Procedure: Block-nerve-medial branch-lumbar;  Surgeon: Syed Betancourt MD;  Location: [facility];  Service: Pain Management;  Laterality: Left;  L3,4,5    NECK SURGERY       Family History   Problem Relation Name Age of Onset    Cancer Mother          lung cancer    Alcohol abuse Father      Cancer Maternal Grandfather          Throat cancer     Social History     Tobacco Use    Smoking status: Former     Types: Cigarettes    Smokeless tobacco: Never   Substance Use Topics    Alcohol use: Never    Drug use: Never     Review of Systems    Constitutional:  Negative for fever.   HENT:  Negative for sore throat.    Respiratory:  Positive for shortness of breath.    Cardiovascular:  Positive for chest pain.   Gastrointestinal:  Positive for abdominal pain and blood in stool. Negative for nausea.   Genitourinary:  Negative for dysuria.   Musculoskeletal:  Negative for back pain.   Skin:  Negative for rash.   Neurological:  Negative for weakness.       Physical Exam     Initial Vitals [06/28/24 1417]   BP Pulse Resp Temp SpO2   (!) 151/78 86 20 98.8 °F (37.1 °C) 97 %      MAP       --         Physical Exam    Nursing note and vitals reviewed.  Constitutional: He appears well-developed and well-nourished. He is not diaphoretic.  Non-toxic appearance. He does not have a sickly appearance. He does not appear ill. No distress.   HENT:   Head: Normocephalic and atraumatic.   Eyes: EOM are normal.   Neck: Neck supple.   Normal range of motion.  Cardiovascular:  Normal rate, regular rhythm and normal heart sounds.     Exam reveals no gallop and no friction rub.       No murmur heard.  Pulmonary/Chest: Breath sounds normal. No respiratory distress. He has no wheezes. He has no rhonchi. He has no rales.   Abdominal: There is abdominal tenderness in the epigastric area and left lower quadrant.   Musculoskeletal:         General: Normal range of motion.      Cervical back: Normal range of motion and neck supple. No rigidity. Normal range of motion.     Neurological: He is alert and oriented to person, place, and time.   Skin: Skin is warm and dry. No rash noted.   Psychiatric: He has a normal mood and affect. His behavior is normal. Judgment and thought content normal.         ED Course   Procedures  Labs Reviewed   CBC W/ AUTO DIFFERENTIAL - Abnormal; Notable for the following components:       Result Value    RBC 3.07 (*)     Hemoglobin 9.8 (*)     Hematocrit 29.2 (*)     MCH 31.9 (*)     Immature Granulocytes 1.9 (*)     Immature Grans (Abs) 0.19 (*)     All  other components within normal limits   COMPREHENSIVE METABOLIC PANEL - Abnormal; Notable for the following components:    CO2 21 (*)     Glucose 126 (*)     Total Protein 5.7 (*)     Albumin 3.4 (*)     AST 9 (*)     All other components within normal limits   CBC W/ AUTO DIFFERENTIAL - Abnormal; Notable for the following components:    RBC 2.71 (*)     Hemoglobin 8.6 (*)     Hematocrit 25.9 (*)     MCH 31.7 (*)     Immature Granulocytes 1.7 (*)     Immature Grans (Abs) 0.12 (*)     All other components within normal limits   COMPREHENSIVE METABOLIC PANEL - Abnormal; Notable for the following components:    Calcium 8.5 (*)     Total Protein 5.1 (*)     Albumin 3.1 (*)     AST 9 (*)     ALT 8 (*)     All other components within normal limits   CBC W/ AUTO DIFFERENTIAL - Abnormal; Notable for the following components:    RBC 2.52 (*)     Hemoglobin 8.1 (*)     Hematocrit 23.9 (*)     MCH 32.1 (*)     MPV 9.1 (*)     Immature Granulocytes 1.6 (*)     Immature Grans (Abs) 0.07 (*)     All other components within normal limits   TROPONIN I   B-TYPE NATRIURETIC PEPTIDE   LIPASE   TYPE & SCREEN          Imaging Results              CTA Acute GI Edgar, Abdomen and Pelvis (Final result)  Result time 06/29/24 09:54:07      Final result by Charline Tam MD (06/29/24 09:54:07)                   Impression:      Findings consistent with acute, active gastrointestinal bleed in the duodenum most likely originating in the proximal duodenum.    Additional findings as detailed above including cholelithiasis without CT findings of acute cholecystitis.  Diverticulosis without CT findings of acute diverticulitis    The report pertaining to the active, acute gastrointestinal bleed was called directly by myself to Dr. Millard 09:50 06/29/2024    Final read      Electronically signed by: Charline Tam MD  Date:    06/29/2024  Time:    09:54               Narrative:    EXAMINATION:  CTA ACUTE GI BLEED, ABDOMEN AND  PELVIS    CLINICAL HISTORY:  gib;    TECHNIQUE:  Axial images were obtained of the abdomen and pelvis without p.o. or IV contrast.  Following which axial images of the abdomen and pelvis were obtained with IV contrast with 75 cc Omnipaque 350 injected intravenously and delayed images axial of the abdomen and pelvis were also obtained.  Multiplanar reformatted images were obtained.    COMPARISON:  Routine CT 04/07/2021    FINDINGS:  Visualized lung bases essentially clear    Liver, spleen adrenal glands, pancreas unremarkable appearance.  Cholelithiasis with calcified stone in the gallbladder without CT findings of acute cholecystitis or biliary duct dilatation.  Renal enhancement is symmetrical and there is no hydronephrosis.  No opaque renal stone.  8 mm hypodensity left kidney consistent with small cyst.  Urinary bladder mildly distended at time of the exam.  The wall appears mildly diffusely thick even allowing for the incomplete degree of distention.  Prostate gland measures 3.9 cm transversely    There is diverticulosis appearing severe in the sigmoid colon without CT findings of acute diverticulitis.  Normal appearance of the appendix.  No free intraperitoneal air or fluid.  Small hiatal hernia    Vascular structures; scattered calcified plaque.  Abdominal aorta tapers without aneurysmal dilatation or major vascular occlusion or significant stenosis    Common iliac arteries, external iliac arteries, common femoral arteries patent bilaterally without significant stenosis or major vascular occlusion    Celiac axis; patent without significant stenosis or major vascular occlusion    Superior mesenteric artery; patent without significant stenosis or major vascular occlusion    Inferior mesenteric artery; patent at its origin    Renal artery; single renal artery bilaterally patent without significant stenosis or major vascular occlusion    Small fat containing umbilical hernia    There is high density similar to IV  contrast material within the proximal duodenum on the early arterial imaging appearing partially dispersed on delayed imaging progressing on into the more distal duodenum and proximal jejunum, with blood fluid level, and the appearance consistent acute, active gastrointestinal bleed appearing proximal duodenum or less likely distal stomach.  For example axial series 3 images 60 through 87, coronal series 601 images 77, axial series 5 images 61 through 71.                                       X-Ray Chest PA And Lateral (Final result)  Result time 06/28/24 15:05:02   Notes recorded by Sai Mccracken III on 6/28/2024 at 8:28 PM CDT  NORMAL followup as needed.     Final result by Charline Tam MD (06/28/24 15:05:02)                   Impression:      No acute cardiopulmonary disease.      Electronically signed by: Charline Tam MD  Date:    06/28/2024  Time:    15:05               Narrative:    EXAMINATION:  XR CHEST PA AND LATERAL    CLINICAL HISTORY:  Chest Pain;    TECHNIQUE:  PA and lateral views of the chest were performed.    COMPARISON:  11/04/2015    FINDINGS:  The heart is not enlarged.  The lungs are clear of infiltrate.  There is no pleural effusion.  Postoperative changes in the lower cervical spine                                       Medications   lactated ringers infusion ( Intravenous New Bag 6/29/24 0828)   pantoprazole injection 40 mg (40 mg Intravenous Given 6/29/24 0824)   melatonin (MELATIN) 3 mg tablet (has no administration in time range)   oxyCODONE-acetaminophen 7.5-325 mg per tablet 1 tablet (1 tablet Oral Given 6/29/24 0931)   pregabalin capsule 225 mg (225 mg Oral Not Given 6/29/24 0945)   lamoTRIgine tablet 25 mg (25 mg Oral Not Given 6/29/24 0945)   pantoprazole injection 40 mg (40 mg Intravenous Given 6/28/24 1736)   iohexoL (OMNIPAQUE 350) 350 mg iodine/mL injection (75 mLs Intravenous Given 6/28/24 1800)   morphine injection 4 mg (4 mg Intravenous Given 6/28/24 1809)   morphine  injection 4 mg (4 mg Intravenous Given 6/28/24 2159)     Medical Decision Making  Fifty-nine year history of diverticulosis presents with several days of bloody stools.  Also some epigastric pain and left lower quadrant pain.  Hemoglobin is 10 in the ER, baseline is 15.  CT abd is unremarkable for anything acute.  Case discussed with GI, agrees with the admission.  Patient will be admitted to Hospital Medicine.  He has not had any rectal bleeding in the emergency room.    Amount and/or Complexity of Data Reviewed  Labs:  Decision-making details documented in ED Course.  Radiology: ordered. Decision-making details documented in ED Course.  Discussion of management or test interpretation with external provider(s): 1714 Case discussed with Dr. Pruitt she agrees with GI bleed CT protocol, hemoglobin tonight, clears until midnight, IV PPI b.i.d.. [EF]      Risk  Prescription drug management.  Parenteral controlled substances.  Decision regarding hospitalization.               ED Course as of 06/29/24 1420   Fri Jun 28, 2024   1644 WBC: 10.13 [EF]   1644 Hemoglobin(!): 9.8 [EF]   1644 Platelet Count: 267 [EF]   1644 X-Ray Chest PA And Lateral [EF]   1644 Sinus rhythm 92 beats per minute normal axis no ST elevation or depression or T-wave inversion independently interpreted [EF]   1703 Troponin I: <0.006 [EF]   1714 Case discussed with Dr. Pruitt she agrees with GI bleed CT protocol, hemoglobin tonight, clears until midnight, IV PPI b.i.d.. [EF]   1841 X-Ray Chest PA And Lateral [EF]   1936 IMPRESSION: 1. No CT evidence of active gastrointestinal hemorrhage. 2. Urinary bladder wall thickening measuring up to 5 mm may be due to infectious or inflammatory cystitis. 3. Colonic diverticulosis. 4. Mild constipation. 5. Cholelithiasis. 6. Small hiatal hernia. Dictated and Authenticated by: Amy Singh DO 06/28/2024 6:54 PM Central Time ([place] & [place])   [EF]   2134 [place] with HM will pass on admit to night PA   [EF]   Sat Fox  29, 2024   0961 Patient currently boarding in emergency department, Dr. Tam radiologist called and notify me patient has active GI bleed, vRad read as negative initially.  Notified hospitalist team  and GI physician Dr. Pruitt [KB]      ED Course User Index  [EF] Thierry Thao MD  [KB] Ravi Millard Jr., DO                           Clinical Impression:  Final diagnoses:  [R07.9] Chest pain  [K92.2] GIB (gastrointestinal bleeding)          ED Disposition Condition    Observation                 Thierry Thao MD  06/28/24 4880       Thierry Thao MD  06/29/24 6487

## 2024-06-28 NOTE — FIRST PROVIDER EVALUATION
Emergency Department TeleTriage Encounter Note      CHIEF COMPLAINT    Chief Complaint   Patient presents with    Chest Pain     X 2 days, epigastric in nature.       Rectal Bleeding     X 3 days, maroon colored.        VITAL SIGNS   Initial Vitals [06/28/24 1417]   BP Pulse Resp Temp SpO2   (!) 151/78 86 20 98.8 °F (37.1 °C) 97 %      MAP       --            ALLERGIES    Review of patient's allergies indicates:  No Known Allergies    PROVIDER TRIAGE NOTE  This is a teletriage evaluation of a 59 y.o. male presenting to the ED with c/o dizziness and chest pain. Maroon stools. No abdominal pain . Limited physical exam via telehealth: The patient is awake, alert, answering questions appropriately and is not in respiratory distress. Initial orders will be placed and care will be transferred to an alternate provider when patient is roomed for a full evaluation. Any additional orders and the final disposition will be determined by that provider.         ORDERS  Labs Reviewed - No data to display    ED Orders (720h ago, onward)      Start Ordered     Status Ordering Provider    06/28/24 1433 06/28/24 1432  Vital signs  Every 15 min         Ordered JESUS ALBERTO BIGGS    06/28/24 1433 06/28/24 1432  Cardiac Monitoring - Adult  Continuous        Comments: Notify Physician If:    Ordered JESUS ALBERTO BIGGS    06/28/24 1433 06/28/24 1432  Pulse Oximetry Continuous  Continuous         Ordered JESUS ABLERTO BIGGS    06/28/24 1433 06/28/24 1432  Diet NPO  Diet effective now         Ordered JESUS ALBERTO BIGGS    06/28/24 1433 06/28/24 1432  Saline lock IV  Once         Ordered JESUS ALBERTO BIGGS    06/28/24 1433 06/28/24 1432  CBC auto differential  STAT         Ordered JESUS ALBERTO BIGGS    06/28/24 1433 06/28/24 1432  Comprehensive metabolic panel  STAT         Ordered JESUS ALBERTO BIGGS    06/28/24 1433 06/28/24 1432  Troponin I #1  STAT         Ordered JESUS ALBERTO BIGGS    06/28/24 1433 06/28/24 1432  B-Type natriuretic peptide (BNP)  STAT          Ordered JESUS ALBERTO BIGGS P.    06/28/24 1433 06/28/24 1432  X-Ray Chest PA And Lateral  1 time imaging         Ordered JESUS ALBERTO BIGGS P.    06/28/24 1433 06/28/24 1432  Lipase  STAT         Ordered JESUS ALBERTO BIGGS P.    06/28/24 1421 06/28/24 1421  EKG 12-lead  Once         Ordered JAVIER PLUMMER              Virtual Visit Note: The provider triage portion of this emergency department evaluation and documentation was performed via [employer], a HIPAA-compliant telemedicine application, in concert with a tele-presenter in the room. A face to face patient evaluation with one of my colleagues will occur once the patient is placed in an emergency department room.      DISCLAIMER: This note was prepared with [employer] voice recognition transcription software. Garbled syntax, mangled pronouns, and other bizarre constructions may be attributed to that software system.

## 2024-06-28 NOTE — ED NOTES
Patient is sitting upright in the bed using her cellphone. NAD noted. VSS. States he feels pain relief after morphine.

## 2024-06-28 NOTE — ED NOTES
Presents to the ED d/t chest pain (tight) that radiates to lips, epigastric abd pain, abd cramping, burgundy stools. Hx of diverticulitis. Denies SOB. AAOx4 with a GCS 15. Significant other at the bedside.

## 2024-06-29 ENCOUNTER — ANESTHESIA (OUTPATIENT)
Dept: ENDOSCOPY | Facility: HOSPITAL | Age: 59
End: 2024-06-29
Payer: COMMERCIAL

## 2024-06-29 ENCOUNTER — ANESTHESIA EVENT (OUTPATIENT)
Dept: ENDOSCOPY | Facility: HOSPITAL | Age: 59
End: 2024-06-29
Payer: COMMERCIAL

## 2024-06-29 LAB
ALBUMIN SERPL BCP-MCNC: 3.1 G/DL (ref 3.5–5.2)
ALP SERPL-CCNC: 72 U/L (ref 55–135)
ALT SERPL W/O P-5'-P-CCNC: 8 U/L (ref 10–44)
ANION GAP SERPL CALC-SCNC: 9 MMOL/L (ref 8–16)
AST SERPL-CCNC: 9 U/L (ref 10–40)
BASOPHILS # BLD AUTO: 0.02 K/UL (ref 0–0.2)
BASOPHILS # BLD AUTO: 0.03 K/UL (ref 0–0.2)
BASOPHILS NFR BLD: 0.4 % (ref 0–1.9)
BILIRUB SERPL-MCNC: 0.3 MG/DL (ref 0.1–1)
BUN SERPL-MCNC: 17 MG/DL (ref 6–20)
CALCIUM SERPL-MCNC: 8.5 MG/DL (ref 8.7–10.5)
CHLORIDE SERPL-SCNC: 106 MMOL/L (ref 95–110)
CO2 SERPL-SCNC: 25 MMOL/L (ref 23–29)
CREAT SERPL-MCNC: 0.8 MG/DL (ref 0.5–1.4)
DIFFERENTIAL METHOD BLD: ABNORMAL
EOSINOPHIL # BLD AUTO: 0.1 K/UL (ref 0–0.5)
EOSINOPHIL NFR BLD: 1.9 % (ref 0–8)
EOSINOPHIL NFR BLD: 2 % (ref 0–8)
EOSINOPHIL NFR BLD: 2.2 % (ref 0–8)
EOSINOPHIL NFR BLD: 2.5 % (ref 0–8)
ERYTHROCYTE [DISTWIDTH] IN BLOOD BY AUTOMATED COUNT: 13.2 % (ref 11.5–14.5)
EST. GFR  (NO RACE VARIABLE): >60 ML/MIN/1.73 M^2
GLUCOSE SERPL-MCNC: 98 MG/DL (ref 70–110)
HCT VFR BLD AUTO: 23.5 % (ref 40–54)
HCT VFR BLD AUTO: 23.9 % (ref 40–54)
HCT VFR BLD AUTO: 25.7 % (ref 40–54)
HCT VFR BLD AUTO: 25.9 % (ref 40–54)
HGB BLD-MCNC: 7.9 G/DL (ref 14–18)
HGB BLD-MCNC: 8.1 G/DL (ref 14–18)
HGB BLD-MCNC: 8.5 G/DL (ref 14–18)
HGB BLD-MCNC: 8.6 G/DL (ref 14–18)
IMM GRANULOCYTES # BLD AUTO: 0.07 K/UL (ref 0–0.04)
IMM GRANULOCYTES # BLD AUTO: 0.07 K/UL (ref 0–0.04)
IMM GRANULOCYTES # BLD AUTO: 0.11 K/UL (ref 0–0.04)
IMM GRANULOCYTES # BLD AUTO: 0.12 K/UL (ref 0–0.04)
IMM GRANULOCYTES NFR BLD AUTO: 1.3 % (ref 0–0.5)
IMM GRANULOCYTES NFR BLD AUTO: 1.6 % (ref 0–0.5)
IMM GRANULOCYTES NFR BLD AUTO: 1.7 % (ref 0–0.5)
IMM GRANULOCYTES NFR BLD AUTO: 2.2 % (ref 0–0.5)
LYMPHOCYTES # BLD AUTO: 1.4 K/UL (ref 1–4.8)
LYMPHOCYTES # BLD AUTO: 1.7 K/UL (ref 1–4.8)
LYMPHOCYTES # BLD AUTO: 1.8 K/UL (ref 1–4.8)
LYMPHOCYTES # BLD AUTO: 2.3 K/UL (ref 1–4.8)
LYMPHOCYTES NFR BLD: 31.5 % (ref 18–48)
LYMPHOCYTES NFR BLD: 33.5 % (ref 18–48)
LYMPHOCYTES NFR BLD: 34.9 % (ref 18–48)
LYMPHOCYTES NFR BLD: 35 % (ref 18–48)
MCH RBC QN AUTO: 31.6 PG (ref 27–31)
MCH RBC QN AUTO: 31.7 PG (ref 27–31)
MCH RBC QN AUTO: 32.1 PG (ref 27–31)
MCH RBC QN AUTO: 32.4 PG (ref 27–31)
MCHC RBC AUTO-ENTMCNC: 33.1 G/DL (ref 32–36)
MCHC RBC AUTO-ENTMCNC: 33.2 G/DL (ref 32–36)
MCHC RBC AUTO-ENTMCNC: 33.6 G/DL (ref 32–36)
MCHC RBC AUTO-ENTMCNC: 33.9 G/DL (ref 32–36)
MCV RBC AUTO: 95 FL (ref 82–98)
MCV RBC AUTO: 96 FL (ref 82–98)
MONOCYTES # BLD AUTO: 0.3 K/UL (ref 0.3–1)
MONOCYTES # BLD AUTO: 0.4 K/UL (ref 0.3–1)
MONOCYTES # BLD AUTO: 0.4 K/UL (ref 0.3–1)
MONOCYTES # BLD AUTO: 0.5 K/UL (ref 0.3–1)
MONOCYTES NFR BLD: 6.5 % (ref 4–15)
MONOCYTES NFR BLD: 7.1 % (ref 4–15)
MONOCYTES NFR BLD: 7.1 % (ref 4–15)
MONOCYTES NFR BLD: 7.2 % (ref 4–15)
NEUTROPHILS # BLD AUTO: 2.6 K/UL (ref 1.8–7.7)
NEUTROPHILS # BLD AUTO: 2.6 K/UL (ref 1.8–7.7)
NEUTROPHILS # BLD AUTO: 2.8 K/UL (ref 1.8–7.7)
NEUTROPHILS # BLD AUTO: 3.9 K/UL (ref 1.8–7.7)
NEUTROPHILS NFR BLD: 53.2 % (ref 38–73)
NEUTROPHILS NFR BLD: 54.3 % (ref 38–73)
NEUTROPHILS NFR BLD: 55.2 % (ref 38–73)
NEUTROPHILS NFR BLD: 57.5 % (ref 38–73)
NRBC BLD-RTO: 0 /100 WBC
PLATELET # BLD AUTO: 190 K/UL (ref 150–450)
PLATELET # BLD AUTO: 210 K/UL (ref 150–450)
PLATELET # BLD AUTO: 222 K/UL (ref 150–450)
PLATELET # BLD AUTO: 222 K/UL (ref 150–450)
PMV BLD AUTO: 9.1 FL (ref 9.2–12.9)
PMV BLD AUTO: 9.5 FL (ref 9.2–12.9)
PMV BLD AUTO: 9.5 FL (ref 9.2–12.9)
PMV BLD AUTO: 9.6 FL (ref 9.2–12.9)
POTASSIUM SERPL-SCNC: 4.1 MMOL/L (ref 3.5–5.1)
PROT SERPL-MCNC: 5.1 G/DL (ref 6–8.4)
RBC # BLD AUTO: 2.44 M/UL (ref 4.6–6.2)
RBC # BLD AUTO: 2.52 M/UL (ref 4.6–6.2)
RBC # BLD AUTO: 2.69 M/UL (ref 4.6–6.2)
RBC # BLD AUTO: 2.71 M/UL (ref 4.6–6.2)
SODIUM SERPL-SCNC: 140 MMOL/L (ref 136–145)
WBC # BLD AUTO: 4.48 K/UL (ref 3.9–12.7)
WBC # BLD AUTO: 4.9 K/UL (ref 3.9–12.7)
WBC # BLD AUTO: 5.2 K/UL (ref 3.9–12.7)
WBC # BLD AUTO: 6.99 K/UL (ref 3.9–12.7)

## 2024-06-29 PROCEDURE — 43235 EGD DIAGNOSTIC BRUSH WASH: CPT | Performed by: INTERNAL MEDICINE

## 2024-06-29 PROCEDURE — 99214 OFFICE O/P EST MOD 30 MIN: CPT | Mod: 25,,, | Performed by: INTERNAL MEDICINE

## 2024-06-29 PROCEDURE — 43235 EGD DIAGNOSTIC BRUSH WASH: CPT | Mod: ,,, | Performed by: INTERNAL MEDICINE

## 2024-06-29 PROCEDURE — 63600175 PHARM REV CODE 636 W HCPCS: Performed by: NURSE ANESTHETIST, CERTIFIED REGISTERED

## 2024-06-29 PROCEDURE — G0378 HOSPITAL OBSERVATION PER HR: HCPCS

## 2024-06-29 PROCEDURE — 96376 TX/PRO/DX INJ SAME DRUG ADON: CPT

## 2024-06-29 PROCEDURE — D9220A PRA ANESTHESIA: Mod: ,,, | Performed by: NURSE ANESTHETIST, CERTIFIED REGISTERED

## 2024-06-29 PROCEDURE — 96361 HYDRATE IV INFUSION ADD-ON: CPT

## 2024-06-29 PROCEDURE — 80053 COMPREHEN METABOLIC PANEL: CPT | Performed by: NURSE PRACTITIONER

## 2024-06-29 PROCEDURE — 94799 UNLISTED PULMONARY SVC/PX: CPT

## 2024-06-29 PROCEDURE — 96361 HYDRATE IV INFUSION ADD-ON: CPT | Mod: 59

## 2024-06-29 PROCEDURE — C9113 INJ PANTOPRAZOLE SODIUM, VIA: HCPCS | Performed by: NURSE PRACTITIONER

## 2024-06-29 PROCEDURE — 25000003 PHARM REV CODE 250: Performed by: HOSPITALIST

## 2024-06-29 PROCEDURE — 25000003 PHARM REV CODE 250: Performed by: NURSE ANESTHETIST, CERTIFIED REGISTERED

## 2024-06-29 PROCEDURE — 63600175 PHARM REV CODE 636 W HCPCS: Performed by: NURSE PRACTITIONER

## 2024-06-29 PROCEDURE — 99900035 HC TECH TIME PER 15 MIN (STAT)

## 2024-06-29 PROCEDURE — 37000008 HC ANESTHESIA 1ST 15 MINUTES: Performed by: INTERNAL MEDICINE

## 2024-06-29 PROCEDURE — 85025 COMPLETE CBC W/AUTO DIFF WBC: CPT | Mod: 91 | Performed by: NURSE PRACTITIONER

## 2024-06-29 PROCEDURE — 94761 N-INVAS EAR/PLS OXIMETRY MLT: CPT

## 2024-06-29 PROCEDURE — 36415 COLL VENOUS BLD VENIPUNCTURE: CPT | Performed by: NURSE PRACTITIONER

## 2024-06-29 RX ORDER — LIDOCAINE HYDROCHLORIDE 20 MG/ML
INJECTION INTRAVENOUS
Status: DISCONTINUED | OUTPATIENT
Start: 2024-06-29 | End: 2024-06-29

## 2024-06-29 RX ORDER — PREGABALIN 75 MG/1
225 CAPSULE ORAL DAILY
Status: DISCONTINUED | OUTPATIENT
Start: 2024-06-29 | End: 2024-07-01 | Stop reason: HOSPADM

## 2024-06-29 RX ORDER — TALC
POWDER (GRAM) TOPICAL
Status: DISPENSED
Start: 2024-06-29 | End: 2024-06-29

## 2024-06-29 RX ORDER — PROPOFOL 10 MG/ML
VIAL (ML) INTRAVENOUS
Status: DISCONTINUED | OUTPATIENT
Start: 2024-06-29 | End: 2024-06-29

## 2024-06-29 RX ORDER — PHENYLEPHRINE HYDROCHLORIDE 10 MG/ML
INJECTION INTRAVENOUS
Status: DISCONTINUED | OUTPATIENT
Start: 2024-06-29 | End: 2024-06-29

## 2024-06-29 RX ORDER — OXYCODONE AND ACETAMINOPHEN 7.5; 325 MG/1; MG/1
1 TABLET ORAL EVERY 4 HOURS PRN
Status: DISCONTINUED | OUTPATIENT
Start: 2024-06-29 | End: 2024-07-01 | Stop reason: HOSPADM

## 2024-06-29 RX ORDER — LAMOTRIGINE 25 MG/1
25 TABLET ORAL 2 TIMES DAILY
Status: DISCONTINUED | OUTPATIENT
Start: 2024-06-29 | End: 2024-07-01 | Stop reason: HOSPADM

## 2024-06-29 RX ADMIN — OXYCODONE AND ACETAMINOPHEN 1 TABLET: 7.5; 325 TABLET ORAL at 08:06

## 2024-06-29 RX ADMIN — PHENYLEPHRINE HYDROCHLORIDE 100 MCG: 10 INJECTION INTRAVENOUS at 01:06

## 2024-06-29 RX ADMIN — PROPOFOL 100 MG: 10 INJECTION, EMULSION INTRAVENOUS at 01:06

## 2024-06-29 RX ADMIN — OXYCODONE AND ACETAMINOPHEN 1 TABLET: 7.5; 325 TABLET ORAL at 02:06

## 2024-06-29 RX ADMIN — OXYCODONE AND ACETAMINOPHEN 1 TABLET: 7.5; 325 TABLET ORAL at 09:06

## 2024-06-29 RX ADMIN — PROPOFOL 30 MG: 10 INJECTION, EMULSION INTRAVENOUS at 01:06

## 2024-06-29 RX ADMIN — PANTOPRAZOLE SODIUM 40 MG: 40 INJECTION, POWDER, LYOPHILIZED, FOR SOLUTION INTRAVENOUS at 08:06

## 2024-06-29 RX ADMIN — LAMOTRIGINE 25 MG: 25 TABLET ORAL at 08:06

## 2024-06-29 RX ADMIN — SODIUM CHLORIDE, POTASSIUM CHLORIDE, SODIUM LACTATE AND CALCIUM CHLORIDE: 600; 310; 30; 20 INJECTION, SOLUTION INTRAVENOUS at 08:06

## 2024-06-29 RX ADMIN — LIDOCAINE HYDROCHLORIDE 100 MG: 20 INJECTION, SOLUTION INTRAVENOUS at 01:06

## 2024-06-29 NOTE — ANESTHESIA PREPROCEDURE EVALUATION
06/29/2024  Jerod Barrientos is a 59 y.o., male.      Pre-op Assessment    I have reviewed the NPO Status.   I have reviewed the Medications.     Review of Systems  Anesthesia Hx:  No problems with previous Anesthesia                Hematology/Oncology:       -- Anemia:                                  Cardiovascular:  Exercise tolerance: good   Hypertension              ECG has been reviewed.                    Hypertension         Pulmonary:        Sleep Apnea, CPAP     Obstructive Sleep Apnea (BERNARD).      Education provided regarding risk of obstructive sleep apnea            Hepatic/GI:        melena          Neurological:    Neuromuscular Disease,                                 Neuromuscular Disease   Endocrine:  Endocrine Normal            Psych:  Psychiatric History                  Physical Exam  General: Well nourished    Airway:  Mallampati: II   Mouth Opening: Normal  TM Distance: Normal  Tongue: Normal  Neck ROM: Normal ROM    Dental:  Intact    Chest/Lungs:  Clear to auscultation, Normal Respiratory Rate        Anesthesia Plan  Type of Anesthesia, risks & benefits discussed:    Anesthesia Type: Gen Natural Airway  Intra-op Monitoring Plan: Standard ASA Monitors  Induction:  IV  Informed Consent: Informed consent signed with the Patient and all parties understand the risks and agree with anesthesia plan.  All questions answered.   ASA Score: 3    Ready For Surgery From Anesthesia Perspective.     .

## 2024-06-29 NOTE — ANESTHESIA POSTPROCEDURE EVALUATION
Anesthesia Post Evaluation    Patient: Jerod Barrientos    Procedure(s) Performed: Procedure(s) (LRB):  EGD (ESOPHAGOGASTRODUODENOSCOPY) (N/A)    Final Anesthesia Type: general      Patient location during evaluation: PACU  Patient participation: Yes- Able to Participate  Level of consciousness: awake and alert and oriented  Post-procedure vital signs: reviewed and stable  Pain management: adequate  Airway patency: patent    PONV status at discharge: No PONV  Anesthetic complications: no      Cardiovascular status: blood pressure returned to baseline  Respiratory status: unassisted, spontaneous ventilation and room air  Hydration status: euvolemic  Follow-up not needed.              Vitals Value Taken Time   /53 06/29/24 1342   Temp  06/29/24 1343   Pulse 68 06/29/24 1343   Resp 15 06/29/24 1343   SpO2 100 % 06/29/24 1343   Vitals shown include unfiled device data.      No case tracking events are documented in the log.      Pain/Sanket Score: Pain Rating Prior to Med Admin: 5 (6/29/2024  9:31 AM)  Pain Rating Post Med Admin: 6 (6/29/2024  9:32 AM)  Sanket Score: 9 (6/29/2024  1:40 PM)

## 2024-06-29 NOTE — PLAN OF CARE
Pt awake, alert.  Vital signs stable, denies nausea, states pain is 0, abd soft.  No adverse effects of anesthesia noted. Transferred to ER room 13 per wheelchair,  Report given to RAFAEL Rey.  Placed back on monitors, IV fluids restarted.  Bed in low and locked position, call light in reach.  Instructed to call for assistance.

## 2024-06-29 NOTE — H&P
"  [facility]  History & Physical    Patient Name: Jerod Barrientos  MRN: [id number]  Patient Class: OP- Observation  Admission Date: 6/28/2024  Attending Physician: Kezia Deras MD   Primary Care Provider: Sai Mccracken III, MD         Patient information was obtained from patient, past medical records, and ER records.     Subjective:     Principal Problem:GIB (gastrointestinal bleeding)    Chief Complaint:   Chief Complaint   Patient presents with    Chest Pain     X 2 days, epigastric in nature.       Rectal Bleeding     X 3 days, maroon colored.         HPI: Jerod Barrientos is a 59 year old male with a past medical history of HTN and occasional tobacco use who presented to the ED with a complaint of rectal bleeding. He reports 3 days of dark bloody stool along with epigastric and left lower quadrant pain. He reports shortness of breath with exertion along with chest discomfort, but denies at rest. He takes Celebrex and the occasional Goody's powder. He denies other complaint.     ED work up included a CBC which shows anemia. CMP shows moderate malnutrition. Lipase 18, Troponin <0.006, BNP <10. CXR negative for acute findings. GI consulted, Dr. Pruitt, who recommended a CT GIB protocol, along with NPO status for scope tomorrow, and PPI BID. CT shows "IMPRESSION: 1. No CT evidence of active gastrointestinal hemorrhage. 2. Urinary bladder wall thickening measuring up to 5 mm may be due to infectious or inflammatory cystitis. 3. Colonic diverticulosis. 4. Mild constipation. 5. Cholelithiasis. 6. Small hiatal hernia."  He was given Protonix in the ED. Hospital medicine consulted for admission and further management.        Past Medical History:   Diagnosis Date    Hypertension     Sleep apnea        Past Surgical History:   Procedure Laterality Date    COLONOSCOPY N/A 04/24/2023    Procedure: COLONOSCOPY;  Surgeon: Yunior Major MD;  Location: [facility];  Service: " Endoscopy;  Laterality: N/A;  lm/ppm    ESOPHAGOGASTRODUODENOSCOPY  2012    ESOPHAGOGASTRODUODENOSCOPY N/A 7/6/2023    Procedure: EGD (ESOPHAGOGASTRODUODENOSCOPY);  Surgeon: Yunior Major MD;  Location: [facility];  Service: Endoscopy;  Laterality: N/A;    INJECTION OF ANESTHETIC AGENT AROUND MEDIAL BRANCH NERVES INNERVATING LUMBAR FACET JOINT Left 05/16/2023    Procedure: Block-nerve-medial branch-lumbar;  Surgeon: Syed Betancourt MD;  Location: [facility] OR;  Service: Pain Management;  Laterality: Left;  L3,4,5    NECK SURGERY         Review of patient's allergies indicates:  No Known Allergies    No current facility-administered medications on file prior to encounter.     Current Outpatient Medications on File Prior to Encounter   Medication Sig    amLODIPine (NORVASC) 10 MG tablet TAKE 1 TABLET BY MOUTH EVERY DAY    celecoxib (CELEBREX) 200 MG capsule Take 200 mg by mouth 2 (two) times daily.    EScitalopram oxalate (LEXAPRO) 20 MG tablet Take 1 tablet (20 mg total) by mouth once daily.    methylPREDNISolone (MEDROL DOSEPACK) 4 mg tablet Take by mouth.    oxyCODONE-acetaminophen (PERCOCET) 7.5-325 mg per tablet Take 1 tablet by mouth 3 (three) times daily.    pantoprazole (PROTONIX) 40 MG tablet Take 1 tablet (40 mg total) by mouth 2 (two) times daily.    pregabalin (LYRICA) 200 MG Cap Take 200 mg by mouth once daily.    tiZANidine (ZANAFLEX) 4 MG tablet Take 4 mg by mouth 2 (two) times daily.    [DISCONTINUED] baclofen (LIORESAL) 10 MG tablet TAKE 1 TABLET BY MOUTH THREE TIMES A DAY    lamoTRIgine (LAMICTAL) 25 MG tablet TAKE TWO TABLETS BY MOUTH TWICE DAILY    triamcinolone acetonide 0.1% (KENALOG) 0.1 % cream Apply topically 2 (two) times daily.    [DISCONTINUED] HYDROcodone-acetaminophen (NORCO) 7.5-325 mg per tablet Take 1 tablet by mouth.    [DISCONTINUED] pregabalin (LYRICA) 150 MG capsule Take 150 mg by mouth.    [DISCONTINUED] QUEtiapine (SEROQUEL) 50 MG tablet Take 1 tablet (50 mg total) by mouth every  evening.     Family History       Problem Relation (Age of Onset)    Alcohol abuse Father    Cancer Mother, Maternal Grandfather          Tobacco Use    Smoking status: Former     Types: Cigarettes    Smokeless tobacco: Never   Substance and Sexual Activity    Alcohol use: Never    Drug use: Never    Sexual activity: Yes     Partners: Female     Review of Systems   Constitutional:  Negative for chills and fever.   HENT:  Negative for congestion and sore throat.    Eyes:  Negative for visual disturbance.   Respiratory:  Positive for shortness of breath. Negative for cough.    Cardiovascular:  Positive for chest pain. Negative for palpitations.   Gastrointestinal:  Positive for abdominal pain and blood in stool. Negative for constipation, diarrhea, nausea and vomiting.   Endocrine: Negative for cold intolerance and heat intolerance.   Genitourinary:  Negative for dysuria and hematuria.   Musculoskeletal:  Negative for arthralgias and myalgias.   Skin:  Negative for rash.   Neurological:  Negative for tremors and seizures.   Hematological:  Negative for adenopathy. Does not bruise/bleed easily.   All other systems reviewed and are negative.    Objective:     Vital Signs (Most Recent):  Temp: 98.8 °F (37.1 °C) (06/28/24 1417)  Pulse: 79 (06/28/24 2030)  Resp: 16 (06/28/24 2030)  BP: 116/80 (06/28/24 2030)  SpO2: 100 % (06/28/24 2030) Vital Signs (24h Range):  Temp:  [98.8 °F (37.1 °C)] 98.8 °F (37.1 °C)  Pulse:  [63-86] 79  Resp:  [16-20] 16  SpO2:  [97 %-100 %] 100 %  BP: (116-151)/(69-88) 116/80     Weight: 88.9 kg (196 lb)  Body mass index is 26.58 kg/m².     Physical Exam  Vitals and nursing note reviewed.   Constitutional:       General: He is awake. He is not in acute distress.     Appearance: Normal appearance. He is well-developed. He is not ill-appearing.   HENT:      Head: Normocephalic and atraumatic.      Nose: Nose normal. No septal deviation.      Mouth/Throat:      Lips: Pink.      Mouth: Mucous membranes  are moist.   Eyes:      Conjunctiva/sclera: Conjunctivae normal.      Pupils: Pupils are equal, round, and reactive to light.   Neck:      Thyroid: No thyroid mass.      Vascular: No JVD.      Trachea: No tracheal tenderness or tracheal deviation.   Cardiovascular:      Rate and Rhythm: Normal rate and regular rhythm.      Heart sounds: Normal heart sounds, S1 normal and S2 normal. No murmur heard.     No friction rub. No gallop.   Pulmonary:      Effort: Pulmonary effort is normal.      Breath sounds: Normal breath sounds. No decreased breath sounds, wheezing, rhonchi or rales.   Abdominal:      General: Bowel sounds are normal. There is no distension.      Palpations: Abdomen is soft. There is no hepatomegaly, splenomegaly or mass.      Tenderness: There is abdominal tenderness in the epigastric area and left upper quadrant.   Skin:     General: Skin is warm and dry.      Capillary Refill: Capillary refill takes less than 2 seconds.      Findings: No rash.   Neurological:      General: No focal deficit present.      Mental Status: He is alert and oriented to person, place, and time.      Cranial Nerves: No cranial nerve deficit.      Sensory: No sensory deficit.   Psychiatric:         Mood and Affect: Mood normal.         Behavior: Behavior normal. Behavior is cooperative.              CRANIAL NERVES     CN III, IV, VI   Pupils are equal, round, and reactive to light.       Significant Labs: All pertinent labs within the past 24 hours have been reviewed.  CBC:   Recent Labs   Lab 06/28/24  1614   WBC 10.13   HGB 9.8*   HCT 29.2*        CMP:   Recent Labs   Lab 06/28/24  1614      K 4.1      CO2 21*   *   BUN 20   CREATININE 0.8   CALCIUM 8.7   PROT 5.7*   ALBUMIN 3.4*   BILITOT 0.3   ALKPHOS 77   AST 9*   ALT 12   ANIONGAP 11     Cardiac Markers:   Recent Labs   Lab 06/28/24  1614   BNP <10     Lipase:   Recent Labs   Lab 06/28/24  1614   LIPASE 18     Troponin:   Recent Labs   Lab  06/28/24  1614   TROPONINI <0.006       Significant Imaging: I have reviewed all pertinent imaging results/findings within the past 24 hours.  Imaging Results              CTA Acute GI [place], Abdomen and Pelvis (In process)    IMPRESSION: 1. No CT evidence of active gastrointestinal hemorrhage. 2. Urinary bladder wall thickening measuring up to 5 mm may be due to infectious or inflammatory cystitis. 3. Colonic diverticulosis. 4. Mild constipation. 5. Cholelithiasis. 6. Small hiatal hernia.                  X-Ray Chest PA And Lateral (Final result)  Result time 06/28/24 15:05:02   Notes recorded by Sai Mccracken III on 6/28/2024 at 8:28 PM CDT  NORMAL followup as needed.     Final result by Charline Tam MD (06/28/24 15:05:02)                   Impression:      No acute cardiopulmonary disease.      Electronically signed by: Charline Tam MD  Date:    06/28/2024  Time:    15:05               Narrative:    EXAMINATION:  XR CHEST PA AND LATERAL    CLINICAL HISTORY:  Chest Pain;    TECHNIQUE:  PA and lateral views of the chest were performed.    COMPARISON:  11/04/2015    FINDINGS:  The heart is not enlarged.  The lungs are clear of infiltrate.  There is no pleural effusion.  Postoperative changes in the lower cervical spine                                      Assessment/Plan:     * GIB (gastrointestinal bleeding)  Patient has acute blood loss due to hemorrhage, the hemorrhage is due to gastrointestinal bleed, patient does have a propensity for bleeding due to a medication, the medication is celebrex, occasional goody's powders.. Will trend hemoglobin/hematocrit Every 6 hours, as well as monitor and correct for any coagulation defects. CBC and vital signs have been reviewed and last CBC was noted-   Lab Results   Component Value Date    WBC 10.13 06/28/2024    HGB 9.8 (L) 06/28/2024    HCT 29.2 (L) 06/28/2024    MCV 95 06/28/2024     06/28/2024         Will order a type and screen and consent patient  for blood transfusion. Will transfuse if Hgb is <7g/dl (<8g/dl in cases of active ACS) or if patient has rapid bleeding leading to hemodynamic instability.    Acute blood loss anemia  Patient's anemia is currently controlled. Has not received any PRBCs to date. Etiology likely d/t acute blood loss which was from GIB  Current CBC reviewed-   Lab Results   Component Value Date    HGB 9.8 (L) 06/28/2024    HCT 29.2 (L) 06/28/2024     Monitor serial CBC and transfuse if patient becomes hemodynamically unstable, symptomatic or H/H drops below 7/21.    Malnutrition of moderate degree  Nutrition consulted. Most recent weight and BMI monitored-     Measurements:  Wt Readings from Last 1 Encounters:   06/28/24 88.9 kg (196 lb)   Body mass index is 26.58 kg/m².    Patient has been screened and assessed by RD.    Malnutrition Type:  Context:    Level:      Malnutrition Characteristic Summary:       Interventions/Recommendations (treatment strategy):         Hypertension, essential  Chronic, controlled. Latest blood pressure and vitals reviewed-     Temp:  [98.8 °F (37.1 °C)]   Pulse:  [63-86]   Resp:  [16-20]   BP: (116-151)/(69-88)   SpO2:  [97 %-100 %] .   Home meds for hypertension were reviewed and noted below.   Hypertension Medications               amLODIPine (NORVASC) 10 MG tablet TAKE 1 TABLET BY MOUTH EVERY DAY            While in the hospital, will manage blood pressure as follows; Continue home antihypertensive regimen    Will utilize p.r.n. blood pressure medication only if patient's blood pressure greater than 180/110 and he develops symptoms such as worsening chest pain or shortness of breath.      VTE Risk Mitigation (From admission, onward)           Ordered     IP VTE LOW RISK PATIENT  Once         06/28/24 2143     Place sequential compression device  Until discontinued         06/28/24 2143     Place LUKE hose  Until discontinued         06/28/24 2143                              Mira Miller,  FNP  Department of Hospital Medicine  [facility]

## 2024-06-29 NOTE — ASSESSMENT & PLAN NOTE
Patient has acute blood loss due to hemorrhage, the hemorrhage is due to gastrointestinal bleed, patient does have a propensity for bleeding due to a medication, the medication is celebrex, occasional goody's powders.. Will trend hemoglobin/hematocrit Every 6 hours, as well as monitor and correct for any coagulation defects. CBC and vital signs have been reviewed and last CBC was noted-   Lab Results   Component Value Date    WBC 10.13 06/28/2024    HGB 9.8 (L) 06/28/2024    HCT 29.2 (L) 06/28/2024    MCV 95 06/28/2024     06/28/2024         Will order a type and screen and consent patient for blood transfusion. Will transfuse if Hgb is <7g/dl (<8g/dl in cases of active ACS) or if patient has rapid bleeding leading to hemodynamic instability.

## 2024-06-29 NOTE — TRANSFER OF CARE
Anesthesia Transfer of Care Note    Patient: Jerod Barrientos    Procedure(s) Performed: Procedure(s) (LRB):  EGD (ESOPHAGOGASTRODUODENOSCOPY) (N/A)    Patient location: PACU    Anesthesia Type: general    Transport from OR: Transported from OR on 2-3 L/min O2 by NC with adequate spontaneous ventilation    Post pain: adequate analgesia    Post assessment: no apparent anesthetic complications and tolerated procedure well    Post vital signs: stable    Level of consciousness: sedated and responds to stimulation    Nausea/Vomiting: no nausea/vomiting    Complications: none    Transfer of care protocol was followed    Last vitals: Visit Vitals  /76 (BP Location: Right arm, Patient Position: Lying)   Pulse 63   Temp 36.6 °C (97.9 °F) (Skin)   Resp 18   Ht 6' (1.829 m)   Wt 88.9 kg (196 lb)   SpO2 100%   BMI 26.58 kg/m²

## 2024-06-29 NOTE — HPI
"Jerod Barrientos is a 59 year old male with a past medical history of HTN and occasional tobacco use who presented to the ED with a complaint of rectal bleeding. He reports 3 days of dark bloody stool along with epigastric and left lower quadrant pain. He reports shortness of breath with exertion along with chest discomfort, but denies at rest. He takes Celebrex and the occasional Goody's powder. He denies other complaint.     ED work up included a CBC which shows anemia. CMP shows moderate malnutrition. Lipase 18, Troponin <0.006, BNP <10. CXR negative for acute findings. GI consulted, Dr. Pruitt, who recommended a CT GIB protocol, along with NPO status for scope tomorrow, and PPI BID. CT shows "IMPRESSION: 1. No CT evidence of active gastrointestinal hemorrhage. 2. Urinary bladder wall thickening measuring up to 5 mm may be due to infectious or inflammatory cystitis. 3. Colonic diverticulosis. 4. Mild constipation. 5. Cholelithiasis. 6. Small hiatal hernia."  He was given Protonix in the ED. Hospital medicine consulted for admission and further management.      "

## 2024-06-29 NOTE — ASSESSMENT & PLAN NOTE
Chronic, controlled. Latest blood pressure and vitals reviewed-     Temp:  [98.8 °F (37.1 °C)]   Pulse:  [63-86]   Resp:  [16-20]   BP: (116-151)/(69-88)   SpO2:  [97 %-100 %] .   Home meds for hypertension were reviewed and noted below.   Hypertension Medications               amLODIPine (NORVASC) 10 MG tablet TAKE 1 TABLET BY MOUTH EVERY DAY            While in the hospital, will manage blood pressure as follows; Continue home antihypertensive regimen    Will utilize p.r.n. blood pressure medication only if patient's blood pressure greater than 180/110 and he develops symptoms such as worsening chest pain or shortness of breath.

## 2024-06-29 NOTE — SUBJECTIVE & OBJECTIVE
Past Medical History:   Diagnosis Date    Hypertension     Sleep apnea        Past Surgical History:   Procedure Laterality Date    COLONOSCOPY N/A 04/24/2023    Procedure: COLONOSCOPY;  Surgeon: Yunior Major MD;  Location: [facility] ENDO;  Service: Endoscopy;  Laterality: N/A;  lm/ppm    ESOPHAGOGASTRODUODENOSCOPY  2012    ESOPHAGOGASTRODUODENOSCOPY N/A 7/6/2023    Procedure: EGD (ESOPHAGOGASTRODUODENOSCOPY);  Surgeon: Yunior Major MD;  Location: [facility] ENDO;  Service: Endoscopy;  Laterality: N/A;    INJECTION OF ANESTHETIC AGENT AROUND MEDIAL BRANCH NERVES INNERVATING LUMBAR FACET JOINT Left 05/16/2023    Procedure: Block-nerve-medial branch-lumbar;  Surgeon: Syed Betancourt MD;  Location: [facility] OR;  Service: Pain Management;  Laterality: Left;  L3,4,5    NECK SURGERY         Review of patient's allergies indicates:  No Known Allergies    No current facility-administered medications on file prior to encounter.     Current Outpatient Medications on File Prior to Encounter   Medication Sig    amLODIPine (NORVASC) 10 MG tablet TAKE 1 TABLET BY MOUTH EVERY DAY    celecoxib (CELEBREX) 200 MG capsule Take 200 mg by mouth 2 (two) times daily.    EScitalopram oxalate (LEXAPRO) 20 MG tablet Take 1 tablet (20 mg total) by mouth once daily.    methylPREDNISolone (MEDROL DOSEPACK) 4 mg tablet Take by mouth.    oxyCODONE-acetaminophen (PERCOCET) 7.5-325 mg per tablet Take 1 tablet by mouth 3 (three) times daily.    pantoprazole (PROTONIX) 40 MG tablet Take 1 tablet (40 mg total) by mouth 2 (two) times daily.    pregabalin (LYRICA) 200 MG Cap Take 200 mg by mouth once daily.    tiZANidine (ZANAFLEX) 4 MG tablet Take 4 mg by mouth 2 (two) times daily.    [DISCONTINUED] baclofen (LIORESAL) 10 MG tablet TAKE 1 TABLET BY MOUTH THREE TIMES A DAY    lamoTRIgine (LAMICTAL) 25 MG tablet TAKE TWO TABLETS BY MOUTH TWICE DAILY    triamcinolone acetonide 0.1% (KENALOG) 0.1 % cream Apply topically 2 (two) times daily.    [DISCONTINUED]  HYDROcodone-acetaminophen (NORCO) 7.5-325 mg per tablet Take 1 tablet by mouth.    [DISCONTINUED] pregabalin (LYRICA) 150 MG capsule Take 150 mg by mouth.    [DISCONTINUED] QUEtiapine (SEROQUEL) 50 MG tablet Take 1 tablet (50 mg total) by mouth every evening.     Family History       Problem Relation (Age of Onset)    Alcohol abuse Father    Cancer Mother, Maternal Grandfather          Tobacco Use    Smoking status: Former     Types: Cigarettes    Smokeless tobacco: Never   Substance and Sexual Activity    Alcohol use: Never    Drug use: Never    Sexual activity: Yes     Partners: Female     Review of Systems   Constitutional:  Negative for chills and fever.   HENT:  Negative for congestion and sore throat.    Eyes:  Negative for visual disturbance.   Respiratory:  Positive for shortness of breath. Negative for cough.    Cardiovascular:  Positive for chest pain. Negative for palpitations.   Gastrointestinal:  Positive for abdominal pain and blood in stool. Negative for constipation, diarrhea, nausea and vomiting.   Endocrine: Negative for cold intolerance and heat intolerance.   Genitourinary:  Negative for dysuria and hematuria.   Musculoskeletal:  Negative for arthralgias and myalgias.   Skin:  Negative for rash.   Neurological:  Negative for tremors and seizures.   Hematological:  Negative for adenopathy. Does not bruise/bleed easily.   All other systems reviewed and are negative.    Objective:     Vital Signs (Most Recent):  Temp: 98.8 °F (37.1 °C) (06/28/24 1417)  Pulse: 79 (06/28/24 2030)  Resp: 16 (06/28/24 2030)  BP: 116/80 (06/28/24 2030)  SpO2: 100 % (06/28/24 2030) Vital Signs (24h Range):  Temp:  [98.8 °F (37.1 °C)] 98.8 °F (37.1 °C)  Pulse:  [63-86] 79  Resp:  [16-20] 16  SpO2:  [97 %-100 %] 100 %  BP: (116-151)/(69-88) 116/80     Weight: 88.9 kg (196 lb)  Body mass index is 26.58 kg/m².     Physical Exam  Vitals and nursing note reviewed.   Constitutional:       General: He is awake. He is not in acute  distress.     Appearance: Normal appearance. He is well-developed. He is not ill-appearing.   HENT:      Head: Normocephalic and atraumatic.      Nose: Nose normal. No septal deviation.      Mouth/Throat:      Lips: Pink.      Mouth: Mucous membranes are moist.   Eyes:      Conjunctiva/sclera: Conjunctivae normal.      Pupils: Pupils are equal, round, and reactive to light.   Neck:      Thyroid: No thyroid mass.      Vascular: No JVD.      Trachea: No tracheal tenderness or tracheal deviation.   Cardiovascular:      Rate and Rhythm: Normal rate and regular rhythm.      Heart sounds: Normal heart sounds, S1 normal and S2 normal. No murmur heard.     No friction rub. No gallop.   Pulmonary:      Effort: Pulmonary effort is normal.      Breath sounds: Normal breath sounds. No decreased breath sounds, wheezing, rhonchi or rales.   Abdominal:      General: Bowel sounds are normal. There is no distension.      Palpations: Abdomen is soft. There is no hepatomegaly, splenomegaly or mass.      Tenderness: There is abdominal tenderness in the epigastric area and left upper quadrant.   Skin:     General: Skin is warm and dry.      Capillary Refill: Capillary refill takes less than 2 seconds.      Findings: No rash.   Neurological:      General: No focal deficit present.      Mental Status: He is alert and oriented to person, place, and time.      Cranial Nerves: No cranial nerve deficit.      Sensory: No sensory deficit.   Psychiatric:         Mood and Affect: Mood normal.         Behavior: Behavior normal. Behavior is cooperative.              CRANIAL NERVES     CN III, IV, VI   Pupils are equal, round, and reactive to light.       Significant Labs: All pertinent labs within the past 24 hours have been reviewed.  CBC:   Recent Labs   Lab 06/28/24  1614   WBC 10.13   HGB 9.8*   HCT 29.2*        CMP:   Recent Labs   Lab 06/28/24  1614      K 4.1      CO2 21*   *   BUN 20   CREATININE 0.8   CALCIUM 8.7    PROT 5.7*   ALBUMIN 3.4*   BILITOT 0.3   ALKPHOS 77   AST 9*   ALT 12   ANIONGAP 11     Cardiac Markers:   Recent Labs   Lab 06/28/24  1614   BNP <10     Lipase:   Recent Labs   Lab 06/28/24  1614   LIPASE 18     Troponin:   Recent Labs   Lab 06/28/24  1614   TROPONINI <0.006       Significant Imaging: I have reviewed all pertinent imaging results/findings within the past 24 hours.  Imaging Results              CTA Acute GI [place], Abdomen and Pelvis (In process)    IMPRESSION: 1. No CT evidence of active gastrointestinal hemorrhage. 2. Urinary bladder wall thickening measuring up to 5 mm may be due to infectious or inflammatory cystitis. 3. Colonic diverticulosis. 4. Mild constipation. 5. Cholelithiasis. 6. Small hiatal hernia.                  X-Ray Chest PA And Lateral (Final result)  Result time 06/28/24 15:05:02   Notes recorded by Sai Mccracken III on 6/28/2024 at 8:28 PM CDT  NORMAL followup as needed.     Final result by Charline Tma MD (06/28/24 15:05:02)                   Impression:      No acute cardiopulmonary disease.      Electronically signed by: Charline Tam MD  Date:    06/28/2024  Time:    15:05               Narrative:    EXAMINATION:  XR CHEST PA AND LATERAL    CLINICAL HISTORY:  Chest Pain;    TECHNIQUE:  PA and lateral views of the chest were performed.    COMPARISON:  11/04/2015    FINDINGS:  The heart is not enlarged.  The lungs are clear of infiltrate.  There is no pleural effusion.  Postoperative changes in the lower cervical spine

## 2024-06-29 NOTE — PLAN OF CARE
EGD;  -Red blood with clot in gastric body which did not recur after clearance with suction/lavage despite observing for 10 minutes, suspicious for Dieulafoy lesion  -Mild/moderate erosive gastritis and duodenitis     Recommendation:  -CBC this evening and in AM  -Monitor overnight  -Continue PPI BID for now  -Clear liquid diet    Kaitlyn Pruitt MD

## 2024-06-29 NOTE — [employer]ATION PATIENT INSTRUCTIONS
Discharge Summary/Instructions after an Endoscopic Procedure  Patient Name: Jerod Penaloza  Patient MRN: [id number]  Patient YOB: 1965  Saturday, June 29, 2024  Kaitlyn Pruitt MD  Dear patient,  As a result of recent federal legislation (The Federal Cures Act), you may   receive lab or pathology results from your procedure in your [facility]   account before your physician is able to contact you. Your physician or   their representative will relay the results to you with their   recommendations at their soonest availability.  Thank you,  RESTRICTIONS:  During your procedure today, you received medications for sedation.  These   medications may affect your judgment, balance and coordination.  Therefore,   for 24 hours, you have the following restrictions:   - DO NOT drive a car, operate machinery, make legal/financial decisions,   sign important papers or drink alcohol.    ACTIVITY:  Today: no heavy lifting, straining or running due to procedural   sedation/anesthesia.  The following day: return to full activity including work.  DIET:  Eat and drink normally unless instructed otherwise.     TREATMENT FOR COMMON SIDE EFFECTS:  - Mild abdominal pain, nausea, belching, bloating or excessive gas:  rest,   eat lightly and use a heating pad.  - Sore Throat: treat with throat lozenges and/or gargle with warm salt   water.  - Because air was used during the procedure, expelling large amounts of air   from your rectum or belching is normal.  - If a bowel prep was taken, you may not have a bowel movement for 1-3 days.    This is normal.  SYMPTOMS TO WATCH FOR AND REPORT TO YOUR PHYSICIAN:  1. Abdominal pain or bloating, other than gas cramps.  2. Chest pain.  3. Back pain.  4. Signs of infection such as: chills or fever occurring within 24 hours   after the procedure.  5. Rectal bleeding, which would show as bright red, maroon, or black stools.   (A tablespoon of blood from the rectum is not serious,  especially if   hemorrhoids are present.)  6. Vomiting.  7. Weakness or dizziness.  GO DIRECTLY TO THE NEAREST EMERGENCY ROOM IF YOU HAVE ANY OF THE FOLLOWING:      Difficulty breathing              Chills and/or fever over 101 F   Persistent vomiting and/or vomiting blood   Severe abdominal pain   Severe chest pain   Black, tarry stools   Bleeding- more than one tablespoon   Any other symptom or condition that you feel may need urgent attention  Your doctor recommends these additional instructions:  If any biopsies were taken, your doctors clinic will contact you in 1 to 2   weeks with any results.  - Return patient to hospital [facility] for ongoing care.   - Clear liquid diet.   -PPI BID for now  -CBC this evening and in AM  -Monitor overnight  For questions, problems or results please call your physician - Kaitlyn Pruitt MD at Work:  [phone].  [facility], EMERGENCY ROOM PHONE NUMBER: [phone]  IF A COMPLICATION OR EMERGENCY SITUATION ARISES AND YOU ARE UNABLE TO REACH   YOUR PHYSICIAN - GO DIRECTLY TO THE EMERGENCY ROOM.  Kaitlyn Pruitt MD  6/29/2024 1:43:12 PM  This report has been verified and signed electronically.  Dear patient,  As a result of recent federal legislation (The Federal Cures Act), you may   receive lab or pathology results from your procedure in your [facility]   account before your physician is able to contact you. Your physician or   their representative will relay the results to you with their   recommendations at their soonest availability.  Thank you,  [employer]OVATION

## 2024-06-29 NOTE — CONSULTS
[facility]     CC: Abdominal pain, Bloody stools     HPI 59 y.o. male history of tobacco use and HTN presents with several days of acute, moderate, epigastric and chest pain associated with dark bloody stools. He does use Goody's powder's and Celebrex. He denies similar previous symptoms but did note some SOB with exertion. His last EGD was in July 2023 and was notable for H.pylori negative gastritis and moderately severe esophagitis. His last  colonoscopy was in April 2023 and was notable for internal hemorrhoids, left-sided diverticulosis and 4 small polyps (SSAs and hyperplastic).  He does not currently take a PPI.     Past Medical History:   Diagnosis Date    Hypertension     Sleep apnea        Past Surgical History:   Procedure Laterality Date    COLONOSCOPY N/A 04/24/2023    Procedure: COLONOSCOPY;  Surgeon: Yunior Major MD;  Location: [facility];  Service: Endoscopy;  Laterality: N/A;  lm/ppm    ESOPHAGOGASTRODUODENOSCOPY  2012    ESOPHAGOGASTRODUODENOSCOPY N/A 7/6/2023    Procedure: EGD (ESOPHAGOGASTRODUODENOSCOPY);  Surgeon: Yunior Major MD;  Location: [facility];  Service: Endoscopy;  Laterality: N/A;    INJECTION OF ANESTHETIC AGENT AROUND MEDIAL BRANCH NERVES INNERVATING LUMBAR FACET JOINT Left 05/16/2023    Procedure: Block-nerve-medial branch-lumbar;  Surgeon: Syed Betancourt MD;  Location: [facility];  Service: Pain Management;  Laterality: Left;  L3,4,5    NECK SURGERY         Social History     Tobacco Use    Smoking status: Former     Types: Cigarettes    Smokeless tobacco: Never   Substance Use Topics    Alcohol use: Never    Drug use: Never       Family History   Problem Relation Name Age of Onset    Cancer Mother          lung cancer    Alcohol abuse Father      Cancer Maternal Grandfather          Throat cancer       Allergies and Medications reviewed     Review of Systems  General ROS: negative for - chills, fever or weight loss  Psychological ROS: negative for - hallucination,  depression or suicidal ideation  Ophthalmic ROS: negative for - blurry vision, photophobia or eye pain  ENT ROS: negative for - epistaxis, sore throat or rhinorrhea  Respiratory ROS: no cough, shortness of breath, or wheezing  Cardiovascular ROS: + chest pain ,mild dyspnea on exertion  Gastrointestinal ROS: + dark bloody stools, no hematemesis,+ epigastric pain  Genito-Urinary ROS: no dysuria, trouble voiding, or hematuria  Musculoskeletal ROS: negative for - arthralgia, myalgia, weakness  Neurological ROS: no syncope or seizures; no ataxia  Dermatological ROS: negative for pruritis, rash and jaundice    Physical Examination  /65 (BP Location: Left arm, Patient Position: Lying)   Pulse 62   Temp 97.9 °F (36.6 °C) (Oral)   Resp 18   Ht 6' (1.829 m)   Wt 90.7 kg (200 lb)   SpO2 98%   BMI 27.12 kg/m²   General appearance: alert, cooperative, no distress  HENT: Normocephalic, atraumatic, neck symmetrical, no nasal discharge   Eyes: conjunctivae/corneas clear, PERRL, EOM's intact, sclera anicteric  Lungs: clear to auscultation bilaterally, no dullness to percussion bilaterally, symmetric expansion, breathing unlabored  Heart: regular rate and rhythm without rub; no displacement of the PMI   Abdomen: soft, mild ttp over epigastrium without rebound or guarding, BS active, no masses appreciated  Extremities: extremities symmetric; no clubbing, cyanosis, or edema  Integument: Skin color, texture, turgor normal; no rashes; hair distrubution normal, no jaundice  Neurologic: Alert and oriented X 3, no focal sensory or motor neurologic deficits  Psychiatric: no pressured speech; normal affect; no evidence of impaired cognition, no anxiety/depression     Labs:  Lab Results   Component Value Date    WBC 4.90 06/29/2024    HGB 8.5 (L) 06/29/2024    HCT 25.7 (L) 06/29/2024    MCV 96 06/29/2024     06/29/2024       CMP  Sodium   Date Value Ref Range Status   06/29/2024 140 136 - 145 mmol/L Final     Potassium    Date Value Ref Range Status   06/29/2024 4.1 3.5 - 5.1 mmol/L Final     Chloride   Date Value Ref Range Status   06/29/2024 106 95 - 110 mmol/L Final     CO2   Date Value Ref Range Status   06/29/2024 25 23 - 29 mmol/L Final     Glucose   Date Value Ref Range Status   06/29/2024 98 70 - 110 mg/dL Final     BUN   Date Value Ref Range Status   06/29/2024 17 6 - 20 mg/dL Final     Creatinine   Date Value Ref Range Status   06/29/2024 0.8 0.5 - 1.4 mg/dL Final     Calcium   Date Value Ref Range Status   06/29/2024 8.5 (L) 8.7 - 10.5 mg/dL Final     Total Protein   Date Value Ref Range Status   06/29/2024 5.1 (L) 6.0 - 8.4 g/dL Final     Albumin   Date Value Ref Range Status   06/29/2024 3.1 (L) 3.5 - 5.2 g/dL Final     Total Bilirubin   Date Value Ref Range Status   06/29/2024 0.3 0.1 - 1.0 mg/dL Final     Comment:     For infants and newborns, interpretation of results should be based  on gestational age, weight and in agreement with clinical  observations.    Premature Infant recommended reference ranges:  Up to 24 hours.............<8.0 mg/dL  Up to 48 hours............<12.0 mg/dL  3-5 days..................<15.0 mg/dL  6-29 days.................<15.0 mg/dL       Alkaline Phosphatase   Date Value Ref Range Status   06/29/2024 72 55 - 135 U/L Final     AST   Date Value Ref Range Status   06/29/2024 9 (L) 10 - 40 U/L Final     ALT   Date Value Ref Range Status   06/29/2024 8 (L) 10 - 44 U/L Final     Anion Gap   Date Value Ref Range Status   06/29/2024 9 8 - 16 mmol/L Final     eGFR   Date Value Ref Range Status   06/29/2024 >60 >60 mL/min/1.73 m^2 Final   -Troponin <0.006  EKG (independently read by me)- NSR  -Lipase- normal    Previous labs:  -Admission- Hgb- 9.8  -January 2023- Hgb- 15.6      Imaging:  CTA abdomen GI bleeding protocol was independently visualized and reviewed by me and showed Findings consistent with acute, active gastrointestinal bleed in the duodenum most likely originating in the proximal  duodenum.     Additional findings as detailed above including cholelithiasis without CT findings of acute cholecystitis.  Diverticulosis without CT findings of acute diverticulitis     The report pertaining to the active, acute gastrointestinal bleed was called directly by myself to Dr. Millard 09:50 06/29/2024    Assessment:   59 y.o. male with history of gastritis and erosive esophagitis presents with several days of epigastric pain, bloody stools and acute anemia. His CTA was re-read this morning as positive for bleeding the duodenum, he is hemodynamically stable.     Plan:  -NPO  -PPI IV  -EGD today  -CBC this afternoon and in AM  -Avoid NSAIDs  -Type and Screen  -IVFs    Kaitlyn Pruitt MD  [facility]  [address], [address]  [place] [address]  Office: [phone]  Fax: [phone]

## 2024-06-29 NOTE — ASSESSMENT & PLAN NOTE
Patient's anemia is currently controlled. Has not received any PRBCs to date. Etiology likely d/t acute blood loss which was from GIB  Current CBC reviewed-   Lab Results   Component Value Date    HGB 9.8 (L) 06/28/2024    HCT 29.2 (L) 06/28/2024     Monitor serial CBC and transfuse if patient becomes hemodynamically unstable, symptomatic or H/H drops below 7/21.

## 2024-06-29 NOTE — ASSESSMENT & PLAN NOTE
Nutrition consulted. Most recent weight and BMI monitored-     Measurements:  Wt Readings from Last 1 Encounters:   06/28/24 88.9 kg (196 lb)   Body mass index is 26.58 kg/m².    Patient has been screened and assessed by RD.    Malnutrition Type:  Context:    Level:      Malnutrition Characteristic Summary:       Interventions/Recommendations (treatment strategy):

## 2024-06-30 LAB
BASOPHILS # BLD AUTO: 0.02 K/UL (ref 0–0.2)
BASOPHILS # BLD AUTO: 0.03 K/UL (ref 0–0.2)
BASOPHILS NFR BLD: 0.4 % (ref 0–1.9)
BASOPHILS NFR BLD: 0.6 % (ref 0–1.9)
BLD PROD TYP BPU: NORMAL
BLOOD UNIT EXPIRATION DATE: NORMAL
BLOOD UNIT TYPE CODE: 5100
BLOOD UNIT TYPE: NORMAL
CODING SYSTEM: NORMAL
CROSSMATCH INTERPRETATION: NORMAL
DIFFERENTIAL METHOD BLD: ABNORMAL
DIFFERENTIAL METHOD BLD: ABNORMAL
DISPENSE STATUS: NORMAL
EOSINOPHIL # BLD AUTO: 0.1 K/UL (ref 0–0.5)
EOSINOPHIL # BLD AUTO: 0.2 K/UL (ref 0–0.5)
EOSINOPHIL NFR BLD: 2.8 % (ref 0–8)
EOSINOPHIL NFR BLD: 3.6 % (ref 0–8)
ERYTHROCYTE [DISTWIDTH] IN BLOOD BY AUTOMATED COUNT: 13.2 % (ref 11.5–14.5)
ERYTHROCYTE [DISTWIDTH] IN BLOOD BY AUTOMATED COUNT: 13.3 % (ref 11.5–14.5)
HCT VFR BLD AUTO: 20.9 % (ref 40–54)
HCT VFR BLD AUTO: 24.2 % (ref 40–54)
HCT VFR BLD AUTO: 24.2 % (ref 40–54)
HGB BLD-MCNC: 7 G/DL (ref 14–18)
HGB BLD-MCNC: 8.2 G/DL (ref 14–18)
HGB BLD-MCNC: 8.2 G/DL (ref 14–18)
IMM GRANULOCYTES # BLD AUTO: 0.06 K/UL (ref 0–0.04)
IMM GRANULOCYTES # BLD AUTO: 0.06 K/UL (ref 0–0.04)
IMM GRANULOCYTES NFR BLD AUTO: 1.2 % (ref 0–0.5)
IMM GRANULOCYTES NFR BLD AUTO: 1.3 % (ref 0–0.5)
LYMPHOCYTES # BLD AUTO: 1.3 K/UL (ref 1–4.8)
LYMPHOCYTES # BLD AUTO: 1.7 K/UL (ref 1–4.8)
LYMPHOCYTES NFR BLD: 27.8 % (ref 18–48)
LYMPHOCYTES NFR BLD: 34.3 % (ref 18–48)
MCH RBC QN AUTO: 32.3 PG (ref 27–31)
MCH RBC QN AUTO: 32.4 PG (ref 27–31)
MCHC RBC AUTO-ENTMCNC: 33.5 G/DL (ref 32–36)
MCHC RBC AUTO-ENTMCNC: 33.9 G/DL (ref 32–36)
MCV RBC AUTO: 95 FL (ref 82–98)
MCV RBC AUTO: 97 FL (ref 82–98)
MONOCYTES # BLD AUTO: 0.3 K/UL (ref 0.3–1)
MONOCYTES # BLD AUTO: 0.4 K/UL (ref 0.3–1)
MONOCYTES NFR BLD: 7.2 % (ref 4–15)
MONOCYTES NFR BLD: 7.6 % (ref 4–15)
NEUTROPHILS # BLD AUTO: 2.7 K/UL (ref 1.8–7.7)
NEUTROPHILS # BLD AUTO: 2.9 K/UL (ref 1.8–7.7)
NEUTROPHILS NFR BLD: 52.7 % (ref 38–73)
NEUTROPHILS NFR BLD: 60.5 % (ref 38–73)
NRBC BLD-RTO: 0 /100 WBC
NRBC BLD-RTO: 0 /100 WBC
NUM UNITS TRANS PACKED RBC: NORMAL
PLATELET # BLD AUTO: 190 K/UL (ref 150–450)
PLATELET # BLD AUTO: 200 K/UL (ref 150–450)
PMV BLD AUTO: 10.1 FL (ref 9.2–12.9)
PMV BLD AUTO: 9.3 FL (ref 9.2–12.9)
RBC # BLD AUTO: 2.16 M/UL (ref 4.6–6.2)
RBC # BLD AUTO: 2.54 M/UL (ref 4.6–6.2)
WBC # BLD AUTO: 4.72 K/UL (ref 3.9–12.7)
WBC # BLD AUTO: 5.02 K/UL (ref 3.9–12.7)

## 2024-06-30 PROCEDURE — P9016 RBC LEUKOCYTES REDUCED: HCPCS | Performed by: NURSE PRACTITIONER

## 2024-06-30 PROCEDURE — 36415 COLL VENOUS BLD VENIPUNCTURE: CPT | Performed by: INTERNAL MEDICINE

## 2024-06-30 PROCEDURE — G0378 HOSPITAL OBSERVATION PER HR: HCPCS

## 2024-06-30 PROCEDURE — 36415 COLL VENOUS BLD VENIPUNCTURE: CPT | Performed by: NURSE PRACTITIONER

## 2024-06-30 PROCEDURE — 99214 OFFICE O/P EST MOD 30 MIN: CPT | Mod: ,,, | Performed by: INTERNAL MEDICINE

## 2024-06-30 PROCEDURE — 36430 TRANSFUSION BLD/BLD COMPNT: CPT

## 2024-06-30 PROCEDURE — C9113 INJ PANTOPRAZOLE SODIUM, VIA: HCPCS | Performed by: NURSE PRACTITIONER

## 2024-06-30 PROCEDURE — 85025 COMPLETE CBC W/AUTO DIFF WBC: CPT | Mod: 91 | Performed by: INTERNAL MEDICINE

## 2024-06-30 PROCEDURE — 85025 COMPLETE CBC W/AUTO DIFF WBC: CPT | Performed by: NURSE PRACTITIONER

## 2024-06-30 PROCEDURE — 86920 COMPATIBILITY TEST SPIN: CPT | Performed by: NURSE PRACTITIONER

## 2024-06-30 PROCEDURE — 63600175 PHARM REV CODE 636 W HCPCS: Performed by: NURSE PRACTITIONER

## 2024-06-30 PROCEDURE — 96376 TX/PRO/DX INJ SAME DRUG ADON: CPT

## 2024-06-30 PROCEDURE — 96361 HYDRATE IV INFUSION ADD-ON: CPT

## 2024-06-30 PROCEDURE — 25000003 PHARM REV CODE 250: Performed by: HOSPITALIST

## 2024-06-30 RX ORDER — HYDROCODONE BITARTRATE AND ACETAMINOPHEN 500; 5 MG/1; MG/1
TABLET ORAL
Status: DISCONTINUED | OUTPATIENT
Start: 2024-06-30 | End: 2024-07-01 | Stop reason: HOSPADM

## 2024-06-30 RX ADMIN — PANTOPRAZOLE SODIUM 40 MG: 40 INJECTION, POWDER, LYOPHILIZED, FOR SOLUTION INTRAVENOUS at 08:06

## 2024-06-30 RX ADMIN — OXYCODONE AND ACETAMINOPHEN 1 TABLET: 7.5; 325 TABLET ORAL at 08:06

## 2024-06-30 RX ADMIN — OXYCODONE AND ACETAMINOPHEN 1 TABLET: 7.5; 325 TABLET ORAL at 02:06

## 2024-06-30 RX ADMIN — SODIUM CHLORIDE, POTASSIUM CHLORIDE, SODIUM LACTATE AND CALCIUM CHLORIDE: 600; 310; 30; 20 INJECTION, SOLUTION INTRAVENOUS at 05:06

## 2024-06-30 RX ADMIN — LAMOTRIGINE 25 MG: 25 TABLET ORAL at 08:06

## 2024-06-30 NOTE — PROGRESS NOTES
[facility]    CC: Abdominal pain, Bloody stools      HPI 59 y.o. male history of tobacco use and HTN presents with several days of acute, moderate, epigastric and chest pain associated with dark bloody stools. He does use Goody's powder's and Celebrex. He denies similar previous symptoms but did note some SOB with exertion. His last EGD was in July 2023 and was notable for H.pylori negative gastritis and moderately severe esophagitis. His last  colonoscopy was in April 2023 and was notable for internal hemorrhoids, left-sided diverticulosis and 4 small polyps (SSAs and hyperplastic).  He does not currently take a PPI.     Interval History:  Patient without bowel movements since admission and denies abdominal pain, nausea, vomiting, hematemesis. EGD yesterday with red blood with clot in gastric body which did not recur after clearance with suction/lavage despite observing for 10 minutes, suspicious for Dieulafoy lesion. His wife is present and questions answered, we discussed his drop in hemoglobin without overt bleeding    Past Medical History:   Diagnosis Date    Hypertension     Sleep apnea        Allergies and Medications reviewed     Review of Systems  General ROS: negative for - chills, fever or weight loss  Cardiovascular ROS: no chest pain or dyspnea on exertion  Gastrointestinal ROS: no further melena, no hematemesis, no abdominal pain    Physical Examination  /65 (BP Location: Right arm)   Pulse 64   Temp 98.6 °F (37 °C) (Oral)   Resp 19   Ht 6' (1.829 m)   Wt 88.9 kg (196 lb)   SpO2 98%   BMI 26.58 kg/m²   General appearance: alert, cooperative, no distress  HENT: Normocephalic, atraumatic, neck symmetrical, no nasal discharge, sclera anicteric   Lungs: clear to auscultation bilaterally, symmetric chest wall expansion bilaterally  Heart: regular rate and rhythm without rub; no displacement of the PMI   Abdomen: soft, nontender,nondistended, BS active, no masses  appreciated  Extremities: extremities symmetric; no clubbing, cyanosis, or edema        Labs:  Lab Results   Component Value Date    WBC 4.72 06/30/2024    HGB 7.0 (L) 06/30/2024    HCT 20.9 (LL) 06/30/2024    MCV 97 06/30/2024     06/30/2024       CMP  Sodium   Date Value Ref Range Status   06/29/2024 140 136 - 145 mmol/L Final     Potassium   Date Value Ref Range Status   06/29/2024 4.1 3.5 - 5.1 mmol/L Final     Chloride   Date Value Ref Range Status   06/29/2024 106 95 - 110 mmol/L Final     CO2   Date Value Ref Range Status   06/29/2024 25 23 - 29 mmol/L Final     Glucose   Date Value Ref Range Status   06/29/2024 98 70 - 110 mg/dL Final     BUN   Date Value Ref Range Status   06/29/2024 17 6 - 20 mg/dL Final     Creatinine   Date Value Ref Range Status   06/29/2024 0.8 0.5 - 1.4 mg/dL Final     Calcium   Date Value Ref Range Status   06/29/2024 8.5 (L) 8.7 - 10.5 mg/dL Final     Total Protein   Date Value Ref Range Status   06/29/2024 5.1 (L) 6.0 - 8.4 g/dL Final     Albumin   Date Value Ref Range Status   06/29/2024 3.1 (L) 3.5 - 5.2 g/dL Final     Total Bilirubin   Date Value Ref Range Status   06/29/2024 0.3 0.1 - 1.0 mg/dL Final     Comment:     For infants and newborns, interpretation of results should be based  on gestational age, weight and in agreement with clinical  observations.    Premature Infant recommended reference ranges:  Up to 24 hours.............<8.0 mg/dL  Up to 48 hours............<12.0 mg/dL  3-5 days..................<15.0 mg/dL  6-29 days.................<15.0 mg/dL       Alkaline Phosphatase   Date Value Ref Range Status   06/29/2024 72 55 - 135 U/L Final     AST   Date Value Ref Range Status   06/29/2024 9 (L) 10 - 40 U/L Final     ALT   Date Value Ref Range Status   06/29/2024 8 (L) 10 - 44 U/L Final     Anion Gap   Date Value Ref Range Status   06/29/2024 9 8 - 16 mmol/L Final     eGFR   Date Value Ref Range Status   06/29/2024 >60 >60 mL/min/1.73 m^2 Final          Imaging:  CTA abdomen/pelvis Findings consistent with acute, active gastrointestinal bleed in the duodenum most likely originating in the proximal duodenum.     Additional findings as detailed above including cholelithiasis without CT findings of acute cholecystitis.  Diverticulosis without CT findings of acute diverticulitis     The report pertaining to the active, acute gastrointestinal bleed was called directly by myself to Dr. Millard 09:50 06/29/2024    Assessment:   59 y.o. male who presented with several days of acute GI bleeding associated with acute anemia, EGD yesterday with clot in stomach without underlying lesion, suspicious for Dieulafoy lesion. His hemoglobin has continued to trend down however he has not had recurrent overt bleeding, ? Equilibration given IVFs, active bleeding yesterday.     Plan:  -Agree with transfusion 1 unit of pRBCs with post-transfusion H&H  -Continue BID PPI for now  -Ok for regular diet  -CBC in AM, if stable may discharge home  -Call with overt bleeding as would need repeat EGD     Kaitlyn Pruitt MD  [facility]  [address], [address]  [place] [address]  Office: [phone]  Fax: [phone]

## 2024-06-30 NOTE — PROGRESS NOTES
"[facility] Medicine  Progress Note    Patient Name: Jerod Barrientos  MRN: [id number]  Patient Class: OP- Observation   Admission Date: 6/28/2024  Length of Stay: 0 days  Attending Physician: Anitha Warren MD  Primary Care Provider: Sai Mccracken III, MD        Subjective:     Principal Problem:GIB (gastrointestinal bleeding)        HPI:  Jerod Barrientos is a 59 year old male with a past medical history of HTN and occasional tobacco use who presented to the ED with a complaint of rectal bleeding. He reports 3 days of dark bloody stool along with epigastric and left lower quadrant pain. He reports shortness of breath with exertion along with chest discomfort, but denies at rest. He takes Celebrex and the occasional Goody's powder. He denies other complaint.     ED work up included a CBC which shows anemia. CMP shows moderate malnutrition. Lipase 18, Troponin <0.006, BNP <10. CXR negative for acute findings. GI consulted, Dr. Pruitt, who recommended a CT GIB protocol, along with NPO status for scope tomorrow, and PPI BID. CT shows "IMPRESSION: 1. No CT evidence of active gastrointestinal hemorrhage. 2. Urinary bladder wall thickening measuring up to 5 mm may be due to infectious or inflammatory cystitis. 3. Colonic diverticulosis. 4. Mild constipation. 5. Cholelithiasis. 6. Small hiatal hernia."  He was given Protonix in the ED. Hospital medicine consulted for admission and further management.        Overview/Hospital Course:  Patient admitted with GIB. Initial CTA abdomen stated no acute bleed but was corrected by radiology regarding an active GIB present on imaging. GI consulted. Patient underwent EGD.   Red blood and clot in the gastric body. No                          recurrence for 10 minutes after clearance with                          suction/lavage, suspect Dieulafoy lesion.     Clear liquids. PPI BID. Monitor CBC overnight. Patient has chronic back pain and counseled " on Goody's and celebrex.       Interval History: see hospital course     Review of Systems   Respiratory: Negative.     Cardiovascular: Negative.    Musculoskeletal:  Positive for arthralgias and back pain.   Neurological: Negative.    Psychiatric/Behavioral: Negative.       Objective:     Vital Signs (Most Recent):  Temp: 98.4 °F (36.9 °C) (06/30/24 0747)  Pulse: 63 (06/30/24 0747)  Resp: 16 (06/30/24 0845)  BP: 106/63 (06/30/24 0747)  SpO2: 96 % (06/30/24 0747) Vital Signs (24h Range):  Temp:  [97.9 °F (36.6 °C)-98.5 °F (36.9 °C)] 98.4 °F (36.9 °C)  Pulse:  [56-72] 63  Resp:  [14-20] 16  SpO2:  [96 %-100 %] 96 %  BP: (100-131)/(51-76) 106/63     Weight: 88.9 kg (196 lb)  Body mass index is 26.58 kg/m².    Intake/Output Summary (Last 24 hours) at 6/30/2024 0852  Last data filed at 6/30/2024 0800  Gross per 24 hour   Intake 540 ml   Output 2775 ml   Net -2235 ml         Physical Exam  Constitutional:       General: He is not in acute distress.  HENT:      Head: Normocephalic and atraumatic.      Mouth/Throat:      Mouth: Mucous membranes are dry.      Pharynx: Oropharynx is clear.   Cardiovascular:      Rate and Rhythm: Normal rate and regular rhythm.      Pulses: Normal pulses.   Pulmonary:      Effort: Pulmonary effort is normal.      Breath sounds: No wheezing.   Abdominal:      Tenderness: There is no abdominal tenderness. There is no guarding.   Musculoskeletal:         General: Normal range of motion.      Cervical back: Tenderness present. No rigidity.   Skin:     General: Skin is warm and dry.      Capillary Refill: Capillary refill takes less than 2 seconds.   Neurological:      General: No focal deficit present.      Mental Status: He is alert and oriented to person, place, and time. Mental status is at baseline.             Significant Labs: All pertinent labs within the past 24 hours have been reviewed.  Recent Lab Results         06/30/24  0450   06/29/24  1810   06/29/24  1234        Baso # 0.02    0.02   0.02       Basophil % 0.4   0.4   0.4       Differential Method Automated   Automated   Automated       Eos # 0.1   0.1   0.1       Eos % 2.8   1.9   2.2       Gran # (ANC) 2.9   2.8   2.6       Gran % 60.5   54.3   53.2       Hematocrit 20.9  Comment: HCT critical result(s) called and verbal readback obtained from   Radha Bravo RN U. by [facility] 06/30/2024 06:03     23.5   25.7       Hemoglobin 7.0   7.9   8.5       Immature Grans (Abs) 0.06  Comment: Mild elevation in immature granulocytes is non specific and   can be seen in a variety of conditions including stress response,   acute inflammation, trauma and pregnancy. Correlation with other   laboratory and clinical findings is essential.     0.07  Comment: Mild elevation in immature granulocytes is non specific and   can be seen in a variety of conditions including stress response,   acute inflammation, trauma and pregnancy. Correlation with other   laboratory and clinical findings is essential.     0.11  Comment: Mild elevation in immature granulocytes is non specific and   can be seen in a variety of conditions including stress response,   acute inflammation, trauma and pregnancy. Correlation with other   laboratory and clinical findings is essential.         Immature Granulocytes 1.3   1.3   2.2       Lymph # 1.3   1.8   1.7       Lymph % 27.8   35.0   34.9       MCH 32.4   32.4   31.6       MCHC 33.5   33.6   33.1       MCV 97   96   96       Mono # 0.3   0.4   0.4       Mono % 7.2   7.1   7.1       MPV 10.1   9.5   9.5       nRBC 0   0   0       Platelet Count 190   210   222       RBC 2.16   2.44   2.69       RDW 13.3   13.2   13.2       WBC 4.72   5.20   4.90               Significant Imaging: I have reviewed all pertinent imaging results/findings within the past 24 hours.    Assessment/Plan:      * GIB (gastrointestinal bleeding)  Patient has acute blood loss due to hemorrhage, the hemorrhage is due to gastrointestinal bleed, patient does have a  propensity for bleeding due to a medication, the medication is celebrex, occasional goody's powders.. Will trend hemoglobin/hematocrit Every 6 hours, as well as monitor and correct for any coagulation defects. CBC and vital signs have been reviewed and last CBC was noted-   Lab Results   Component Value Date    WBC 10.13 06/28/2024    HGB 9.8 (L) 06/28/2024    HCT 29.2 (L) 06/28/2024    MCV 95 06/28/2024     06/28/2024         Will order a type and screen and consent patient for blood transfusion. Will transfuse if Hgb is <7g/dl (<8g/dl in cases of active ACS) or if patient has rapid bleeding leading to hemodynamic instability.    Acute blood loss anemia  Patient's anemia is currently controlled. Has not received any PRBCs to date. Etiology likely d/t acute blood loss which was from GIB  Current CBC reviewed-   Lab Results   Component Value Date    HGB 7.0 (L) 06/30/2024    HCT 20.9 (LL) 06/30/2024     Monitor serial CBC and transfuse if patient becomes hemodynamically unstable, symptomatic or H/H drops below 7/21.  EGD completed  Monitor CBC  PPI BID      Malnutrition of moderate degree  Nutrition consulted. Most recent weight and BMI monitored-     Measurements:  Wt Readings from Last 1 Encounters:   06/29/24 88.9 kg (196 lb)   Body mass index is 26.58 kg/m².    Patient has been screened and assessed by RD.    Malnutrition Type:  Context:    Level:      Malnutrition Characteristic Summary:       Interventions/Recommendations (treatment strategy):         Hypertension, essential  Chronic, controlled. Latest blood pressure and vitals reviewed-     Temp:  [97.9 °F (36.6 °C)-98.5 °F (36.9 °C)]   Pulse:  [56-72]   Resp:  [14-20]   BP: (100-131)/(51-76)   SpO2:  [96 %-100 %] .   Home meds for hypertension were reviewed and noted below.   Hypertension Medications               amLODIPine (NORVASC) 10 MG tablet TAKE 1 TABLET BY MOUTH EVERY DAY            While in the hospital, will manage blood pressure as follows;  Continue home antihypertensive regimen    Will utilize p.r.n. blood pressure medication only if patient's blood pressure greater than 180/110 and he develops symptoms such as worsening chest pain or shortness of breath.      VTE Risk Mitigation (From admission, onward)           Ordered     IP VTE LOW RISK PATIENT  Once         06/28/24 2143     Place sequential compression device  Until discontinued         06/28/24 2143     Place LUKE hose  Until discontinued         06/28/24 2143                    Discharge Planning   AISHWARYA:      Code Status: Full Code   Is the patient medically ready for discharge?:     Reason for patient still in hospital (select all that apply): Patient trending condition  Discharge Plan A: Home                  Anitha Warren MD  Department of Hospital Medicine   [facility]/Surg

## 2024-06-30 NOTE — SUBJECTIVE & OBJECTIVE
Interval History: see hospital course     Review of Systems   Respiratory: Negative.     Cardiovascular: Negative.    Musculoskeletal:  Positive for arthralgias and back pain.   Neurological: Negative.    Psychiatric/Behavioral: Negative.       Objective:     Vital Signs (Most Recent):  Temp: 98.4 °F (36.9 °C) (06/30/24 0747)  Pulse: 63 (06/30/24 0747)  Resp: 16 (06/30/24 0845)  BP: 106/63 (06/30/24 0747)  SpO2: 96 % (06/30/24 0747) Vital Signs (24h Range):  Temp:  [97.9 °F (36.6 °C)-98.5 °F (36.9 °C)] 98.4 °F (36.9 °C)  Pulse:  [56-72] 63  Resp:  [14-20] 16  SpO2:  [96 %-100 %] 96 %  BP: (100-131)/(51-76) 106/63     Weight: 88.9 kg (196 lb)  Body mass index is 26.58 kg/m².    Intake/Output Summary (Last 24 hours) at 6/30/2024 0852  Last data filed at 6/30/2024 0800  Gross per 24 hour   Intake 540 ml   Output 2775 ml   Net -2235 ml         Physical Exam  Constitutional:       General: He is not in acute distress.  HENT:      Head: Normocephalic and atraumatic.      Mouth/Throat:      Mouth: Mucous membranes are dry.      Pharynx: Oropharynx is clear.   Cardiovascular:      Rate and Rhythm: Normal rate and regular rhythm.      Pulses: Normal pulses.   Pulmonary:      Effort: Pulmonary effort is normal.      Breath sounds: No wheezing.   Abdominal:      Tenderness: There is no abdominal tenderness. There is no guarding.   Musculoskeletal:         General: Normal range of motion.      Cervical back: Tenderness present. No rigidity.   Skin:     General: Skin is warm and dry.      Capillary Refill: Capillary refill takes less than 2 seconds.   Neurological:      General: No focal deficit present.      Mental Status: He is alert and oriented to person, place, and time. Mental status is at baseline.             Significant Labs: All pertinent labs within the past 24 hours have been reviewed.  Recent Lab Results         06/30/24  0450   06/29/24  1810   06/29/24  1234        Baso # 0.02   0.02   0.02       Basophil % 0.4    0.4   0.4       Differential Method Automated   Automated   Automated       Eos # 0.1   0.1   0.1       Eos % 2.8   1.9   2.2       Gran # (ANC) 2.9   2.8   2.6       Gran % 60.5   54.3   53.2       Hematocrit 20.9  Comment: HCT critical result(s) called and verbal readback obtained from   Radha Bravo RN PCU. by [facility] 06/30/2024 06:03     23.5   25.7       Hemoglobin 7.0   7.9   8.5       Immature Grans (Abs) 0.06  Comment: Mild elevation in immature granulocytes is non specific and   can be seen in a variety of conditions including stress response,   acute inflammation, trauma and pregnancy. Correlation with other   laboratory and clinical findings is essential.     0.07  Comment: Mild elevation in immature granulocytes is non specific and   can be seen in a variety of conditions including stress response,   acute inflammation, trauma and pregnancy. Correlation with other   laboratory and clinical findings is essential.     0.11  Comment: Mild elevation in immature granulocytes is non specific and   can be seen in a variety of conditions including stress response,   acute inflammation, trauma and pregnancy. Correlation with other   laboratory and clinical findings is essential.         Immature Granulocytes 1.3   1.3   2.2       Lymph # 1.3   1.8   1.7       Lymph % 27.8   35.0   34.9       MCH 32.4   32.4   31.6       MCHC 33.5   33.6   33.1       MCV 97   96   96       Mono # 0.3   0.4   0.4       Mono % 7.2   7.1   7.1       MPV 10.1   9.5   9.5       nRBC 0   0   0       Platelet Count 190   210   222       RBC 2.16   2.44   2.69       RDW 13.3   13.2   13.2       WBC 4.72   5.20   4.90               Significant Imaging: I have reviewed all pertinent imaging results/findings within the past 24 hours.

## 2024-06-30 NOTE — PLAN OF CARE
Problem: Gastrointestinal Bleeding  Goal: Optimal Coping with Acute Illness  Outcome: Progressing  Goal: Hemostasis  Outcome: Progressing     Problem: Adult Inpatient Plan of Care  Goal: Plan of Care Review  Outcome: Progressing     Problem: Fall Injury Risk  Goal: Absence of Fall and Fall-Related Injury  Outcome: Progressing

## 2024-06-30 NOTE — ASSESSMENT & PLAN NOTE
Patient's anemia is currently controlled. Has not received any PRBCs to date. Etiology likely d/t acute blood loss which was from GIB  Current CBC reviewed-   Lab Results   Component Value Date    HGB 7.0 (L) 06/30/2024    HCT 20.9 (LL) 06/30/2024     Monitor serial CBC and transfuse if patient becomes hemodynamically unstable, symptomatic or H/H drops below 7/21.  EGD completed  Monitor CBC  PPI BID

## 2024-06-30 NOTE — ASSESSMENT & PLAN NOTE
Nutrition consulted. Most recent weight and BMI monitored-     Measurements:  Wt Readings from Last 1 Encounters:   06/29/24 88.9 kg (196 lb)   Body mass index is 26.58 kg/m².    Patient has been screened and assessed by RD.    Malnutrition Type:  Context:    Level:      Malnutrition Characteristic Summary:       Interventions/Recommendations (treatment strategy):

## 2024-06-30 NOTE — HOSPITAL COURSE
Patient admitted with GIB. Initial CTA abdomen stated no acute bleed but was corrected by radiology regarding an active GIB present on imaging. GI consulted. Patient underwent EGD.   Red blood and clot in the gastric body. No                          recurrence for 10 minutes after clearance with                          suction/lavage, suspect Dieulafoy lesion.     Clear liquids. PPI BID. Monitor CBC overnight. Patient has chronic back pain and counseled on Goody's and celebrex.   Transfuse 1 unit; hemoglobin 7.0.    8.0/24.3... h/h on dc home.   Follow up PCP and GI as scheduled

## 2024-06-30 NOTE — ASSESSMENT & PLAN NOTE
Chronic, controlled. Latest blood pressure and vitals reviewed-     Temp:  [97.9 °F (36.6 °C)-98.5 °F (36.9 °C)]   Pulse:  [56-72]   Resp:  [14-20]   BP: (100-131)/(51-76)   SpO2:  [96 %-100 %] .   Home meds for hypertension were reviewed and noted below.   Hypertension Medications               amLODIPine (NORVASC) 10 MG tablet TAKE 1 TABLET BY MOUTH EVERY DAY            While in the hospital, will manage blood pressure as follows; Continue home antihypertensive regimen    Will utilize p.r.n. blood pressure medication only if patient's blood pressure greater than 180/110 and he develops symptoms such as worsening chest pain or shortness of breath.

## 2024-07-01 ENCOUNTER — TELEPHONE (OUTPATIENT)
Dept: GASTROENTEROLOGY | Facility: CLINIC | Age: 59
End: 2024-07-01
Payer: COMMERCIAL

## 2024-07-01 VITALS
WEIGHT: 196 LBS | HEIGHT: 72 IN | OXYGEN SATURATION: 98 % | TEMPERATURE: 98 F | DIASTOLIC BLOOD PRESSURE: 69 MMHG | BODY MASS INDEX: 26.55 KG/M2 | RESPIRATION RATE: 18 BRPM | SYSTOLIC BLOOD PRESSURE: 138 MMHG | HEART RATE: 51 BPM

## 2024-07-01 LAB
BASOPHILS # BLD AUTO: 0.02 K/UL (ref 0–0.2)
BASOPHILS NFR BLD: 0.4 % (ref 0–1.9)
DIFFERENTIAL METHOD BLD: ABNORMAL
EOSINOPHIL # BLD AUTO: 0.2 K/UL (ref 0–0.5)
EOSINOPHIL NFR BLD: 3.1 % (ref 0–8)
ERYTHROCYTE [DISTWIDTH] IN BLOOD BY AUTOMATED COUNT: 13.6 % (ref 11.5–14.5)
HCT VFR BLD AUTO: 24.3 % (ref 40–54)
HGB BLD-MCNC: 8 G/DL (ref 14–18)
IMM GRANULOCYTES # BLD AUTO: 0.06 K/UL (ref 0–0.04)
IMM GRANULOCYTES NFR BLD AUTO: 1.3 % (ref 0–0.5)
LYMPHOCYTES # BLD AUTO: 1.5 K/UL (ref 1–4.8)
LYMPHOCYTES NFR BLD: 32 % (ref 18–48)
MCH RBC QN AUTO: 32.1 PG (ref 27–31)
MCHC RBC AUTO-ENTMCNC: 32.9 G/DL (ref 32–36)
MCV RBC AUTO: 98 FL (ref 82–98)
MONOCYTES # BLD AUTO: 0.5 K/UL (ref 0.3–1)
MONOCYTES NFR BLD: 9.4 % (ref 4–15)
NEUTROPHILS # BLD AUTO: 2.6 K/UL (ref 1.8–7.7)
NEUTROPHILS NFR BLD: 53.8 % (ref 38–73)
NRBC BLD-RTO: 0 /100 WBC
OHS QRS DURATION: 86 MS
OHS QTC CALCULATION: 415 MS
PLATELET # BLD AUTO: 199 K/UL (ref 150–450)
PMV BLD AUTO: 10 FL (ref 9.2–12.9)
RBC # BLD AUTO: 2.49 M/UL (ref 4.6–6.2)
WBC # BLD AUTO: 4.78 K/UL (ref 3.9–12.7)

## 2024-07-01 PROCEDURE — G0378 HOSPITAL OBSERVATION PER HR: HCPCS

## 2024-07-01 PROCEDURE — 85025 COMPLETE CBC W/AUTO DIFF WBC: CPT | Performed by: NURSE PRACTITIONER

## 2024-07-01 PROCEDURE — 96376 TX/PRO/DX INJ SAME DRUG ADON: CPT

## 2024-07-01 PROCEDURE — 36415 COLL VENOUS BLD VENIPUNCTURE: CPT | Performed by: NURSE PRACTITIONER

## 2024-07-01 PROCEDURE — 63600175 PHARM REV CODE 636 W HCPCS: Performed by: NURSE PRACTITIONER

## 2024-07-01 PROCEDURE — 25000003 PHARM REV CODE 250: Performed by: HOSPITALIST

## 2024-07-01 RX ADMIN — PANTOPRAZOLE SODIUM 40 MG: 40 INJECTION, POWDER, LYOPHILIZED, FOR SOLUTION INTRAVENOUS at 08:07

## 2024-07-01 RX ADMIN — OXYCODONE AND ACETAMINOPHEN 1 TABLET: 7.5; 325 TABLET ORAL at 08:07

## 2024-07-01 NOTE — PROGRESS NOTES
"Subjective:       Patient ID: Jerod Barrientos is a 59 y.o. male Body mass index is 27.33 kg/m².    Chief Complaint: Anemia    This patient is new to me.  Referring Provider: Aaareferral Self for hospital f/u.  Established patient of Dr. Pruitt (inpatient).     1 BM yesterday - dark but not pasty or smelly. Complains of fatigue and "no get up and go"   Received blood in the hospital x 3 units.    6/29/24 EGD  Findings:       The esophagus was normal.        A small hiatal hernia was present.        Red blood was found in the gastric body associated with clot. This        was suctioned and lavaged, no recurrence after 10 minutes of        observation.        Patchy mild inflammation characterized by congestion (edema),        erosions, erythema and granularity was found in the entire examined        stomach.        Patchy mild inflammation characterized by congestion (edema),        erosions and erythema was found in the duodenal bulb.   7/5 EGD  Findings:       The oropharynx was normal.        The larynx was normal.        The cricopharyngeus was normal.        [place]-colored mucosa was present in the upper esophagus.        The rest of the esophagus was normal.        The Z-line was regular and was found 39-40 cm from the incisors.        The cardia was normal (no Yana Nowak tear).        The gastric fundus, gastric body and gastric antrum were normal.        Patchy mild inflammation characterized by congestion (edema) and        erythema was found in the prepyloric region of the stomach. Biopsies        were taken with a cold forceps for histology and to r/0 H pylori.        The pylorus was normal.        Patchy erythematous mucosa without active bleeding and with no        stigmata of bleeding was found in the duodenal bulb.        One non-bleeding cratered duodenal ulcer with no stigmata of        bleeding was found in the distal bulb/first portion of the duodenum.        The lesion was 3 mm in largest " "dimension.        The rest of the examined duodenum was normal.        The major papilla was normal.     7/5 Bowel Enteroscopy  Findings:       The esophagus was normal.        The stomach was normal.        Inflammation, characterized by erythema and nodularity was found in        the duodenal bulb.        There was no evidence of significant pathology in the proximal        jejunum.       6/28 [facility] Visit    Chest Pain       X 2 days, epigastric in nature.      · Rectal Bleeding      X 3 days, maroon colored.         HPI: Jerod Barrientos is a 59 year old male with a past medical history of HTN and occasional tobacco use who presented to the ED with a complaint of rectal bleeding. He reports 3 days of dark bloody stool along with epigastric and left lower quadrant pain. He reports shortness of breath with exertion along with chest discomfort, but denies at rest. He takes Celebrex and the occasional Goody's powder. He denies other complaint.      ED work up included a CBC which shows anemia. CMP shows moderate malnutrition. Lipase 18, Troponin <0.006, BNP <10. CXR negative for acute findings. GI consulted, Dr. Pruitt, who recommended a CT GIB protocol, along with NPO status for scope tomorrow, and PPI BID. CT shows "IMPRESSION: 1. No CT evidence of active gastrointestinal hemorrhage. 2. Urinary bladder wall thickening measuring up to 5 mm may be due to infectious or inflammatory cystitis. 3. Colonic diverticulosis. 4. Mild constipation. 5. Cholelithiasis. 6. Small hiatal hernia."  He was given Protonix in the ED. Hospital medicine consulted for admission and further management.     [facility]      CC: Abdominal pain, Bloody stools      HPI 59 y.o. male history of tobacco use and HTN presents with several days of acute, moderate, epigastric and chest pain associated with dark bloody stools. He does use Goody's powder's and Celebrex. He denies similar previous symptoms but did note some SOB with exertion. " His last EGD was in July 2023 and was notable for H.pylori negative gastritis and moderately severe esophagitis. His last  colonoscopy was in April 2023 and was notable for internal hemorrhoids, left-sided diverticulosis and 4 small polyps (SSAs and hyperplastic).  He does not currently take a PPI.   Imaging:  CTA abdomen GI bleeding protocol was independently visualized and reviewed by me and showed Findings consistent with acute, active gastrointestinal bleed in the duodenum most likely originating in the proximal duodenum.     Additional findings as detailed above including cholelithiasis without CT findings of acute cholecystitis.  Diverticulosis without CT findings of acute diverticulitis     The report pertaining to the active, acute gastrointestinal bleed was called directly by myself to Dr. Millard 09:50 06/29/2024     Assessment:   59 y.o. male with history of gastritis and erosive esophagitis presents with several days of epigastric pain, bloody stools and acute anemia. His CTA was re-read this morning as positive for bleeding the duodenum, he is hemodynamically stable.      Plan:  -NPO  -PPI IV  -EGD today  -CBC this afternoon and in AM  -Avoid NSAIDs  -Type and Screen  -IVFs     Kaitlyn Pruitt MD        Review of Systems   Constitutional:  Positive for activity change, appetite change and fatigue. Negative for fever and unexpected weight change.   HENT:  Negative for sore throat and trouble swallowing.    Respiratory:  Negative for cough and shortness of breath.    Cardiovascular:  Negative for chest pain.   Gastrointestinal:  Positive for abdominal pain and blood in stool (dark stool). Negative for abdominal distention, anal bleeding, constipation, diarrhea, nausea, rectal pain and vomiting.   Skin:  Positive for pallor.       No LMP for male patient.  Past Medical History:   Diagnosis Date    Hypertension     Sleep apnea      Past Surgical History:   Procedure Laterality Date    COLONOSCOPY  N/A 04/24/2023    Procedure: COLONOSCOPY;  Surgeon: Yunior Major MD;  Location: [facility];  Service: Endoscopy;  Laterality: N/A;  lm/ppm    ESOPHAGOGASTRODUODENOSCOPY  2012    ESOPHAGOGASTRODUODENOSCOPY N/A 7/6/2023    Procedure: EGD (ESOPHAGOGASTRODUODENOSCOPY);  Surgeon: Yunior Major MD;  Location: [facility];  Service: Endoscopy;  Laterality: N/A;    ESOPHAGOGASTRODUODENOSCOPY N/A 6/29/2024    Procedure: EGD (ESOPHAGOGASTRODUODENOSCOPY);  Surgeon: Kaitlyn Pruitt MD;  Location: [facility];  Service: Endoscopy;  Laterality: N/A;    ESOPHAGOGASTRODUODENOSCOPY N/A 7/4/2024    Procedure: EGD (ESOPHAGOGASTRODUODENOSCOPY);  Surgeon: Niles Butler Jr., MD;  Location: [facility];  Service: Endoscopy;  Laterality: N/A;    INJECTION OF ANESTHETIC AGENT AROUND MEDIAL BRANCH NERVES INNERVATING LUMBAR FACET JOINT Left 05/16/2023    Procedure: Block-nerve-medial branch-lumbar;  Surgeon: Syed Betancourt MD;  Location: [facility] OR;  Service: Pain Management;  Laterality: Left;  L3,4,5    NECK SURGERY      SMALL BOWEL ENTEROSCOPY N/A 7/5/2024    Procedure: ENTEROSCOPY;  Surgeon: Kaitlyn Pruitt MD;  Location: [facility];  Service: Endoscopy;  Laterality: N/A;     Family History   Problem Relation Name Age of Onset    Cancer Mother          lung cancer    Alcohol abuse Father      Cancer Maternal Grandfather          Throat cancer     Social History     Tobacco Use    Smoking status: Former     Types: Cigarettes    Smokeless tobacco: Never   Substance Use Topics    Alcohol use: Never    Drug use: Never     Wt Readings from Last 10 Encounters:   07/08/24 91.4 kg (201 lb 8 oz)   07/03/24 88.9 kg (196 lb)   06/29/24 88.9 kg (196 lb)   09/29/23 94.1 kg (207 lb 6.4 oz)   09/07/23 96 kg (211 lb 10.3 oz)   08/03/23 94.3 kg (207 lb 14.3 oz)   07/06/23 93 kg (205 lb)   05/12/23 93 kg (205 lb)   04/26/23 93 kg (205 lb)   04/24/23 93 kg (205 lb)     Lab Results   Component Value Date    WBC 4.94 07/06/2024    HGB 7.4 (L)  07/06/2024    HCT 22.2 (L) 07/06/2024    MCV 96 07/06/2024     07/06/2024     CMP  Sodium   Date Value Ref Range Status   07/03/2024 135 (L) 136 - 145 mmol/L Final     Potassium   Date Value Ref Range Status   07/03/2024 3.9 3.5 - 5.1 mmol/L Final     Chloride   Date Value Ref Range Status   07/03/2024 107 95 - 110 mmol/L Final     CO2   Date Value Ref Range Status   07/03/2024 24 23 - 29 mmol/L Final     Glucose   Date Value Ref Range Status   07/03/2024 95 70 - 110 mg/dL Final     BUN   Date Value Ref Range Status   07/03/2024 22 (H) 6 - 20 mg/dL Final     Creatinine   Date Value Ref Range Status   07/03/2024 0.7 0.5 - 1.4 mg/dL Final     Calcium   Date Value Ref Range Status   07/03/2024 8.2 (L) 8.7 - 10.5 mg/dL Final     Total Protein   Date Value Ref Range Status   07/03/2024 5.1 (L) 6.0 - 8.4 g/dL Final     Albumin   Date Value Ref Range Status   07/03/2024 3.0 (L) 3.5 - 5.2 g/dL Final     Total Bilirubin   Date Value Ref Range Status   07/03/2024 0.1 0.1 - 1.0 mg/dL Final     Comment:     For infants and newborns, interpretation of results should be based  on gestational age, weight and in agreement with clinical  observations.    Premature Infant recommended reference ranges:  Up to 24 hours.............<8.0 mg/dL  Up to 48 hours............<12.0 mg/dL  3-5 days..................<15.0 mg/dL  6-29 days.................<15.0 mg/dL       Alkaline Phosphatase   Date Value Ref Range Status   07/03/2024 62 55 - 135 U/L Final     AST   Date Value Ref Range Status   07/03/2024 12 10 - 40 U/L Final     ALT   Date Value Ref Range Status   07/03/2024 16 10 - 44 U/L Final     Anion Gap   Date Value Ref Range Status   07/03/2024 4 (L) 8 - 16 mmol/L Final     eGFR if    Date Value Ref Range Status   04/09/2021 >60 >60 mL/min/1.73 m^2 Final     eGFR if non    Date Value Ref Range Status   04/09/2021 >60 >60 mL/min/1.73 m^2 Final     Comment:     Calculation used to obtain the  "estimated glomerular filtration  rate (eGFR) is the CKD-EPI equation.        No results found for: "AMYLASE"  Lab Results   Component Value Date    LIPASE 18 06/28/2024     No results found for: "LIPASERES"  Lab Results   Component Value Date    TSH 1.863 08/24/2023       Reviewed prior medical records including radiology report of X-ray chest 6/2024, CTA acute GI bleed 6/2024 and 7/4/24 & endoscopy history (see surgical history).    Objective:      Physical Exam  Vitals and nursing note reviewed.   Constitutional:       General: He is not in acute distress.     Appearance: He is not ill-appearing.   HENT:      Head: Normocephalic and atraumatic.      Mouth/Throat:      Mouth: Mucous membranes are moist.      Pharynx: Oropharynx is clear.   Eyes:      Conjunctiva/sclera: Conjunctivae normal.   Cardiovascular:      Rate and Rhythm: Normal rate.      Pulses: Normal pulses.   Pulmonary:      Effort: Pulmonary effort is normal. No respiratory distress.   Abdominal:      General: Abdomen is flat. Bowel sounds are normal. There is no distension.      Palpations: Abdomen is soft.      Tenderness: There is no abdominal tenderness.   Skin:     General: Skin is warm and dry.      Capillary Refill: Capillary refill takes 2 to 3 seconds.      Coloration: Skin is pale.   Neurological:      Mental Status: He is alert and oriented to person, place, and time.         Assessment:       1. Upper abdominal pain    2. Gastrointestinal hemorrhage associated with duodenal ulcer    3. Anemia due to acute blood loss    4. Dark stools        Plan:       Upper abdominal pain, Gastrointestinal hemorrhage associated with duodenal ulcer, Anemia due to acute blood loss, Dark stools  - discussed with patient the different ways that anemia occurs: blood loss (such as from the gi tract), the body is not making enough, or the body is breaking down the rbcs too quickly; recommend EGD and colonoscopy to further evaluate gi tract for possible blood " loss and pending results of endoscopies, possible UGI with Small Bowel Follow Through/video capsule study  -follow-up with PCP and/or hematology for continued evaluation and management     Recent colonoscopy and EGD completed - per Dr Pruitt recommend VCE to further investigate anemia and blood loss.    Repeat labs today to assess for continued blood loss and trending down of H/H    If iron low - recommend iron replacement every other day (to reduce GI upset r/t iron supplementation) - this could cause continued dark stools.    Repeat Colonoscopy 2026 unless acute lower GI bleed or new symptoms occur.  -     CBC Without Differential; Future; Expected date: 07/08/2024  -     IRON AND TIBC; Future; Expected date: 07/08/2024  -     Ferritin; Future; Expected date: 07/08/2024      No follow-ups on file.      If no improvement in symptoms or symptoms worsen, call/follow-up at clinic or go to ER.       TOO Hernández, JACP-C    Encounter includes face to face time and non-face to face time preparing to see the patient (eg, review of tests), obtaining and/or reviewing separately obtained history, documenting clinical information in the electronic or other health record, independently interpreting results (not separately reported) and communicating results to the patient/family/caregiver, or care coordination (not separately reported).     A dictation software program was used for this note. Please expect some simple typographical errors in this note.

## 2024-07-01 NOTE — PLAN OF CARE
PCP apt scheduled and added to AVS  Requested GI follow up- office to contact pt for scheduling       07/01/24 4997   Post-Acute Status   Hospital Resources/Appts/Education Provided Appointments scheduled and added to AVS

## 2024-07-01 NOTE — PLAN OF CARE
Pt appears to be resting, eyes are closed, breaths are deep and even. VSS, NAD noted at this time. Discussed PoC with pt, stated they understood and would help as able. C/o pain handled w/PRN meds as ordered, will continue to monitor for duration of shift.

## 2024-07-01 NOTE — SUBJECTIVE & OBJECTIVE
Interval History: see hospital course     Review of Systems   Respiratory: Negative.     Cardiovascular: Negative.    Musculoskeletal:  Positive for arthralgias and back pain.   Neurological: Negative.    Psychiatric/Behavioral: Negative.       Objective:     Vital Signs (Most Recent):  Temp: 98.8 °F (37.1 °C) (06/30/24 1927)  Pulse: 68 (06/30/24 1927)  Resp: 16 (06/30/24 2014)  BP: (!) 108/59 (06/30/24 1927)  SpO2: 96 % (06/30/24 1927) Vital Signs (24h Range):  Temp:  [97.7 °F (36.5 °C)-98.8 °F (37.1 °C)] 98.8 °F (37.1 °C)  Pulse:  [56-70] 68  Resp:  [16-19] 16  SpO2:  [95 %-98 %] 96 %  BP: (100-123)/(56-69) 108/59     Weight: 88.9 kg (196 lb)  Body mass index is 26.58 kg/m².    Intake/Output Summary (Last 24 hours) at 6/30/2024 2338  Last data filed at 6/30/2024 1803  Gross per 24 hour   Intake 1298.75 ml   Output 4150 ml   Net -2851.25 ml         Physical Exam  Constitutional:       General: He is not in acute distress.  HENT:      Head: Normocephalic and atraumatic.      Mouth/Throat:      Mouth: Mucous membranes are dry.      Pharynx: Oropharynx is clear.   Cardiovascular:      Rate and Rhythm: Normal rate and regular rhythm.      Pulses: Normal pulses.   Pulmonary:      Effort: Pulmonary effort is normal.      Breath sounds: No wheezing.   Abdominal:      Tenderness: There is no abdominal tenderness. There is no guarding.   Musculoskeletal:         General: Normal range of motion.      Cervical back: Tenderness present. No rigidity.   Skin:     General: Skin is warm and dry.      Capillary Refill: Capillary refill takes less than 2 seconds.   Neurological:      General: No focal deficit present.      Mental Status: He is alert and oriented to person, place, and time. Mental status is at baseline.             Significant Labs: All pertinent labs within the past 24 hours have been reviewed.  Recent Lab Results         06/30/24 2004 06/30/24  0450        Baso # 0.03   0.02       Basophil % 0.6   0.4        Differential Method Automated   Automated       Eos # 0.2   0.1       Eos % 3.6   2.8       Gran # (ANC) 2.7   2.9       Gran % 52.7   60.5       Hematocrit 24.2   20.9  Comment: HCT critical result(s) called and verbal readback obtained from   Radha Bravo RN PCU. by [facility] 06/30/2024 06:03          24.2         Hemoglobin 8.2   7.0        8.2         Immature Grans (Abs) 0.06  Comment: Mild elevation in immature granulocytes is non specific and   can be seen in a variety of conditions including stress response,   acute inflammation, trauma and pregnancy. Correlation with other   laboratory and clinical findings is essential.     0.06  Comment: Mild elevation in immature granulocytes is non specific and   can be seen in a variety of conditions including stress response,   acute inflammation, trauma and pregnancy. Correlation with other   laboratory and clinical findings is essential.         Immature Granulocytes 1.2   1.3       Lymph # 1.7   1.3       Lymph % 34.3   27.8       MCH 32.3   32.4       MCHC 33.9   33.5       MCV 95   97       Mono # 0.4   0.3       Mono % 7.6   7.2       MPV 9.3   10.1       nRBC 0   0       Platelet Count 200   190       RBC 2.54   2.16       RDW 13.2   13.3       WBC 5.02   4.72               Significant Imaging: I have reviewed all pertinent imaging results/findings within the past 24 hours.

## 2024-07-01 NOTE — NURSING
IV and tele removed. Orders reviewed. Pt wife at bedside. No new prescriptions. Pt ambulated to lobby. Pt d/c home

## 2024-07-01 NOTE — TELEPHONE ENCOUNTER
----- Message from Ritika Urban RN sent at 7/1/2024 11:06 AM CDT -----  Regarding: hospital follow up  Good morning.     This patient is discharging from [facility] today and needs a hospital follow up scheduled. Please contact pt for scheduling      Thank you  RAFAEL Yost Case Manager

## 2024-07-01 NOTE — PLAN OF CARE
Pt clear for DC from case management standpoint. Discharging to home    Spouse to provide transportation home.       07/01/24 1128   Final Note   Assessment Type Final Discharge Note   Anticipated Discharge Disposition Home

## 2024-07-01 NOTE — ASSESSMENT & PLAN NOTE
Patient's anemia is currently controlled. Has not received any PRBCs to date. Etiology likely d/t acute blood loss which was from GIB  Current CBC reviewed-   Lab Results   Component Value Date    HGB 8.2 (L) 06/30/2024    HGB 8.2 (L) 06/30/2024    HCT 24.2 (L) 06/30/2024    HCT 24.2 (L) 06/30/2024     Monitor serial CBC and transfuse if patient becomes hemodynamically unstable, symptomatic or H/H drops below 7/21.  EGD completed  Monitor CBC  PPI BID  1 unit PRBC

## 2024-07-01 NOTE — PROGRESS NOTES
"[facility] Medicine  Progress Note    Patient Name: Jerod Barrientos  MRN: [id number]  Patient Class: OP- Observation   Admission Date: 6/28/2024  Length of Stay: 0 days  Attending Physician: Anitha Warren MD  Primary Care Provider: Sai Mccracken III, MD        Subjective:     Principal Problem:GIB (gastrointestinal bleeding)        HPI:  Jerod Barrientos is a 59 year old male with a past medical history of HTN and occasional tobacco use who presented to the ED with a complaint of rectal bleeding. He reports 3 days of dark bloody stool along with epigastric and left lower quadrant pain. He reports shortness of breath with exertion along with chest discomfort, but denies at rest. He takes Celebrex and the occasional Goody's powder. He denies other complaint.     ED work up included a CBC which shows anemia. CMP shows moderate malnutrition. Lipase 18, Troponin <0.006, BNP <10. CXR negative for acute findings. GI consulted, Dr. Pruitt, who recommended a CT GIB protocol, along with NPO status for scope tomorrow, and PPI BID. CT shows "IMPRESSION: 1. No CT evidence of active gastrointestinal hemorrhage. 2. Urinary bladder wall thickening measuring up to 5 mm may be due to infectious or inflammatory cystitis. 3. Colonic diverticulosis. 4. Mild constipation. 5. Cholelithiasis. 6. Small hiatal hernia."  He was given Protonix in the ED. Hospital medicine consulted for admission and further management.        Overview/Hospital Course:  Patient admitted with GIB. Initial CTA abdomen stated no acute bleed but was corrected by radiology regarding an active GIB present on imaging. GI consulted. Patient underwent EGD.   Red blood and clot in the gastric body. No                          recurrence for 10 minutes after clearance with                          suction/lavage, suspect Dieulafoy lesion.     Clear liquids. PPI BID. Monitor CBC overnight. Patient has chronic back pain and counseled " on Goody's and celebrex.   Transfuse 1 unit; hemoglobin 7.0.    Dc home tomorrow pending labs.    Interval History: see hospital course     Review of Systems   Respiratory: Negative.     Cardiovascular: Negative.    Musculoskeletal:  Positive for arthralgias and back pain.   Neurological: Negative.    Psychiatric/Behavioral: Negative.       Objective:     Vital Signs (Most Recent):  Temp: 98.8 °F (37.1 °C) (06/30/24 1927)  Pulse: 68 (06/30/24 1927)  Resp: 16 (06/30/24 2014)  BP: (!) 108/59 (06/30/24 1927)  SpO2: 96 % (06/30/24 1927) Vital Signs (24h Range):  Temp:  [97.7 °F (36.5 °C)-98.8 °F (37.1 °C)] 98.8 °F (37.1 °C)  Pulse:  [56-70] 68  Resp:  [16-19] 16  SpO2:  [95 %-98 %] 96 %  BP: (100-123)/(56-69) 108/59     Weight: 88.9 kg (196 lb)  Body mass index is 26.58 kg/m².    Intake/Output Summary (Last 24 hours) at 6/30/2024 2338  Last data filed at 6/30/2024 1803  Gross per 24 hour   Intake 1298.75 ml   Output 4150 ml   Net -2851.25 ml         Physical Exam  Constitutional:       General: He is not in acute distress.  HENT:      Head: Normocephalic and atraumatic.      Mouth/Throat:      Mouth: Mucous membranes are dry.      Pharynx: Oropharynx is clear.   Cardiovascular:      Rate and Rhythm: Normal rate and regular rhythm.      Pulses: Normal pulses.   Pulmonary:      Effort: Pulmonary effort is normal.      Breath sounds: No wheezing.   Abdominal:      Tenderness: There is no abdominal tenderness. There is no guarding.   Musculoskeletal:         General: Normal range of motion.      Cervical back: Tenderness present. No rigidity.   Skin:     General: Skin is warm and dry.      Capillary Refill: Capillary refill takes less than 2 seconds.   Neurological:      General: No focal deficit present.      Mental Status: He is alert and oriented to person, place, and time. Mental status is at baseline.             Significant Labs: All pertinent labs within the past 24 hours have been reviewed.  Recent Lab Results          06/30/24 2004 06/30/24  0450        Baso # 0.03   0.02       Basophil % 0.6   0.4       Differential Method Automated   Automated       Eos # 0.2   0.1       Eos % 3.6   2.8       Gran # (ANC) 2.7   2.9       Gran % 52.7   60.5       Hematocrit 24.2   20.9  Comment: HCT critical result(s) called and verbal readback obtained from   Radha Bravo RN PCU. by [facility] 06/30/2024 06:03          24.2         Hemoglobin 8.2   7.0        8.2         Immature Grans (Abs) 0.06  Comment: Mild elevation in immature granulocytes is non specific and   can be seen in a variety of conditions including stress response,   acute inflammation, trauma and pregnancy. Correlation with other   laboratory and clinical findings is essential.     0.06  Comment: Mild elevation in immature granulocytes is non specific and   can be seen in a variety of conditions including stress response,   acute inflammation, trauma and pregnancy. Correlation with other   laboratory and clinical findings is essential.         Immature Granulocytes 1.2   1.3       Lymph # 1.7   1.3       Lymph % 34.3   27.8       MCH 32.3   32.4       MCHC 33.9   33.5       MCV 95   97       Mono # 0.4   0.3       Mono % 7.6   7.2       MPV 9.3   10.1       nRBC 0   0       Platelet Count 200   190       RBC 2.54   2.16       RDW 13.2   13.3       WBC 5.02   4.72               Significant Imaging: I have reviewed all pertinent imaging results/findings within the past 24 hours.    Assessment/Plan:      * GIB (gastrointestinal bleeding)  Patient has acute blood loss due to hemorrhage, the hemorrhage is due to gastrointestinal bleed, patient does have a propensity for bleeding due to a medication, the medication is celebrex, occasional goody's powders.. Will trend hemoglobin/hematocrit Every 6 hours, as well as monitor and correct for any coagulation defects. CBC and vital signs have been reviewed and last CBC was noted-   Lab Results   Component Value Date    WBC 10.13  06/28/2024    HGB 9.8 (L) 06/28/2024    HCT 29.2 (L) 06/28/2024    MCV 95 06/28/2024     06/28/2024         Will order a type and screen and consent patient for blood transfusion. Will transfuse if Hgb is <7g/dl (<8g/dl in cases of active ACS) or if patient has rapid bleeding leading to hemodynamic instability.    Acute blood loss anemia  Patient's anemia is currently controlled. Has not received any PRBCs to date. Etiology likely d/t acute blood loss which was from GIB  Current CBC reviewed-   Lab Results   Component Value Date    HGB 8.2 (L) 06/30/2024    HGB 8.2 (L) 06/30/2024    HCT 24.2 (L) 06/30/2024    HCT 24.2 (L) 06/30/2024     Monitor serial CBC and transfuse if patient becomes hemodynamically unstable, symptomatic or H/H drops below 7/21.  EGD completed  Monitor CBC  PPI BID  1 unit PRBC      Malnutrition of moderate degree  Nutrition consulted. Most recent weight and BMI monitored-     Measurements:  Wt Readings from Last 1 Encounters:   06/29/24 88.9 kg (196 lb)   Body mass index is 26.58 kg/m².    Patient has been screened and assessed by RD.    Malnutrition Type:  Context:    Level:      Malnutrition Characteristic Summary:       Interventions/Recommendations (treatment strategy):         Hypertension, essential  Chronic, controlled. Latest blood pressure and vitals reviewed-     Temp:  [97.9 °F (36.6 °C)-98.5 °F (36.9 °C)]   Pulse:  [56-72]   Resp:  [14-20]   BP: (100-131)/(51-76)   SpO2:  [96 %-100 %] .   Home meds for hypertension were reviewed and noted below.   Hypertension Medications               amLODIPine (NORVASC) 10 MG tablet TAKE 1 TABLET BY MOUTH EVERY DAY            While in the hospital, will manage blood pressure as follows; Continue home antihypertensive regimen    Will utilize p.r.n. blood pressure medication only if patient's blood pressure greater than 180/110 and he develops symptoms such as worsening chest pain or shortness of breath.      VTE Risk Mitigation (From  admission, onward)           Ordered     IP VTE LOW RISK PATIENT  Once         06/28/24 2143     Place sequential compression device  Until discontinued         06/28/24 2143     Place LUKE hose  Until discontinued         06/28/24 2143                    Discharge Planning   AISHWARYA:      Code Status: Full Code   Is the patient medically ready for discharge?:     Reason for patient still in hospital (select all that apply): Patient trending condition, Laboratory test, and Treatment  Discharge Plan A: Home                  Anitha Warren MD  Department of Hospital Medicine   [facility]/Surg

## 2024-07-03 ENCOUNTER — HOSPITAL ENCOUNTER (INPATIENT)
Facility: HOSPITAL | Age: 59
LOS: 2 days | Discharge: HOME | DRG: 378 | End: 2024-07-06
Attending: EMERGENCY MEDICINE | Admitting: HOSPITALIST
Payer: COMMERCIAL

## 2024-07-03 ENCOUNTER — PATIENT MESSAGE (OUTPATIENT)
Dept: GASTROENTEROLOGY | Facility: CLINIC | Age: 59
End: 2024-07-03
Payer: COMMERCIAL

## 2024-07-03 DIAGNOSIS — K92.2 GASTROINTESTINAL HEMORRHAGE, UNSPECIFIED GASTROINTESTINAL HEMORRHAGE TYPE: Primary | ICD-10-CM

## 2024-07-03 DIAGNOSIS — K92.2 GI BLEED: ICD-10-CM

## 2024-07-03 DIAGNOSIS — R06.02 SOB (SHORTNESS OF BREATH): ICD-10-CM

## 2024-07-03 PROBLEM — D64.9 SYMPTOMATIC ANEMIA: Status: ACTIVE | Noted: 2024-07-03

## 2024-07-03 LAB
ABO + RH BLD: NORMAL
ALBUMIN SERPL BCP-MCNC: 3 G/DL (ref 3.5–5.2)
ALP SERPL-CCNC: 62 U/L (ref 55–135)
ALT SERPL W/O P-5'-P-CCNC: 16 U/L (ref 10–44)
ANION GAP SERPL CALC-SCNC: 4 MMOL/L (ref 8–16)
AST SERPL-CCNC: 12 U/L (ref 10–40)
BASOPHILS # BLD AUTO: 0.03 K/UL (ref 0–0.2)
BASOPHILS NFR BLD: 0.4 % (ref 0–1.9)
BILIRUB SERPL-MCNC: 0.1 MG/DL (ref 0.1–1)
BLD GP AB SCN CELLS X3 SERPL QL: NORMAL
BLD PROD TYP BPU: NORMAL
BLD PROD TYP BPU: NORMAL
BLOOD UNIT EXPIRATION DATE: NORMAL
BLOOD UNIT EXPIRATION DATE: NORMAL
BLOOD UNIT TYPE CODE: 5100
BLOOD UNIT TYPE CODE: 5100
BLOOD UNIT TYPE: NORMAL
BLOOD UNIT TYPE: NORMAL
BUN SERPL-MCNC: 22 MG/DL (ref 6–20)
CALCIUM SERPL-MCNC: 8.2 MG/DL (ref 8.7–10.5)
CHLORIDE SERPL-SCNC: 107 MMOL/L (ref 95–110)
CO2 SERPL-SCNC: 24 MMOL/L (ref 23–29)
CODING SYSTEM: NORMAL
CODING SYSTEM: NORMAL
CREAT SERPL-MCNC: 0.7 MG/DL (ref 0.5–1.4)
CROSSMATCH INTERPRETATION: NORMAL
CROSSMATCH INTERPRETATION: NORMAL
DIFFERENTIAL METHOD BLD: ABNORMAL
DISPENSE STATUS: NORMAL
DISPENSE STATUS: NORMAL
EOSINOPHIL # BLD AUTO: 0.1 K/UL (ref 0–0.5)
EOSINOPHIL NFR BLD: 1 % (ref 0–8)
ERYTHROCYTE [DISTWIDTH] IN BLOOD BY AUTOMATED COUNT: 14.6 % (ref 11.5–14.5)
EST. GFR  (NO RACE VARIABLE): >60 ML/MIN/1.73 M^2
GLUCOSE SERPL-MCNC: 95 MG/DL (ref 70–110)
HCT VFR BLD AUTO: 18.3 % (ref 40–54)
HGB BLD-MCNC: 6 G/DL (ref 14–18)
IMM GRANULOCYTES # BLD AUTO: 0.2 K/UL (ref 0–0.04)
IMM GRANULOCYTES NFR BLD AUTO: 2.8 % (ref 0–0.5)
LYMPHOCYTES # BLD AUTO: 1.6 K/UL (ref 1–4.8)
LYMPHOCYTES NFR BLD: 22.1 % (ref 18–48)
MCH RBC QN AUTO: 32.3 PG (ref 27–31)
MCHC RBC AUTO-ENTMCNC: 32.8 G/DL (ref 32–36)
MCV RBC AUTO: 98 FL (ref 82–98)
MONOCYTES # BLD AUTO: 0.5 K/UL (ref 0.3–1)
MONOCYTES NFR BLD: 6.4 % (ref 4–15)
NEUTROPHILS # BLD AUTO: 4.9 K/UL (ref 1.8–7.7)
NEUTROPHILS NFR BLD: 67.3 % (ref 38–73)
NRBC BLD-RTO: 1 /100 WBC
NUM UNITS TRANS PACKED RBC: NORMAL
NUM UNITS TRANS PACKED RBC: NORMAL
PLATELET # BLD AUTO: 245 K/UL (ref 150–450)
PMV BLD AUTO: 10 FL (ref 9.2–12.9)
POTASSIUM SERPL-SCNC: 3.9 MMOL/L (ref 3.5–5.1)
PROT SERPL-MCNC: 5.1 G/DL (ref 6–8.4)
RBC # BLD AUTO: 1.86 M/UL (ref 4.6–6.2)
SODIUM SERPL-SCNC: 135 MMOL/L (ref 136–145)
SPECIMEN OUTDATE: NORMAL
WBC # BLD AUTO: 7.23 K/UL (ref 3.9–12.7)

## 2024-07-03 PROCEDURE — 86920 COMPATIBILITY TEST SPIN: CPT | Performed by: HOSPITALIST

## 2024-07-03 PROCEDURE — 85025 COMPLETE CBC W/AUTO DIFF WBC: CPT | Performed by: EMERGENCY MEDICINE

## 2024-07-03 PROCEDURE — 99285 EMERGENCY DEPT VISIT HI MDM: CPT | Mod: 25

## 2024-07-03 PROCEDURE — P9040 RBC LEUKOREDUCED IRRADIATED: HCPCS | Performed by: HOSPITALIST

## 2024-07-03 PROCEDURE — 36430 TRANSFUSION BLD/BLD COMPNT: CPT

## 2024-07-03 PROCEDURE — 94761 N-INVAS EAR/PLS OXIMETRY MLT: CPT

## 2024-07-03 PROCEDURE — 96374 THER/PROPH/DIAG INJ IV PUSH: CPT

## 2024-07-03 PROCEDURE — G0378 HOSPITAL OBSERVATION PER HR: HCPCS

## 2024-07-03 PROCEDURE — 86901 BLOOD TYPING SEROLOGIC RH(D): CPT | Performed by: EMERGENCY MEDICINE

## 2024-07-03 PROCEDURE — 93005 ELECTROCARDIOGRAM TRACING: CPT

## 2024-07-03 PROCEDURE — P9016 RBC LEUKOCYTES REDUCED: HCPCS | Performed by: HOSPITALIST

## 2024-07-03 PROCEDURE — 93010 ELECTROCARDIOGRAM REPORT: CPT | Mod: ,,, | Performed by: INTERNAL MEDICINE

## 2024-07-03 PROCEDURE — 80053 COMPREHEN METABOLIC PANEL: CPT | Performed by: EMERGENCY MEDICINE

## 2024-07-03 PROCEDURE — 25000003 PHARM REV CODE 250: Performed by: NURSE PRACTITIONER

## 2024-07-03 PROCEDURE — 63600175 PHARM REV CODE 636 W HCPCS: Performed by: EMERGENCY MEDICINE

## 2024-07-03 PROCEDURE — 30233P1 TRANSFUSION OF NONAUTOLOGOUS FROZEN RED CELLS INTO PERIPHERAL VEIN, PERCUTANEOUS APPROACH: ICD-10-PCS | Performed by: EMERGENCY MEDICINE

## 2024-07-03 PROCEDURE — 86900 BLOOD TYPING SEROLOGIC ABO: CPT | Performed by: EMERGENCY MEDICINE

## 2024-07-03 RX ORDER — CELECOXIB 200 MG/1
200 CAPSULE ORAL 2 TIMES DAILY
Status: ON HOLD | COMMUNITY
End: 2024-07-06 | Stop reason: HOSPADM

## 2024-07-03 RX ORDER — PANTOPRAZOLE SODIUM 40 MG/10ML
40 INJECTION, POWDER, LYOPHILIZED, FOR SOLUTION INTRAVENOUS
Status: COMPLETED | OUTPATIENT
Start: 2024-07-03 | End: 2024-07-03

## 2024-07-03 RX ORDER — SODIUM CHLORIDE 9 MG/ML
INJECTION, SOLUTION INTRAVENOUS ONCE
Status: COMPLETED | OUTPATIENT
Start: 2024-07-03 | End: 2024-07-03

## 2024-07-03 RX ORDER — PANTOPRAZOLE SODIUM 40 MG/10ML
40 INJECTION, POWDER, LYOPHILIZED, FOR SOLUTION INTRAVENOUS 2 TIMES DAILY
Status: DISCONTINUED | OUTPATIENT
Start: 2024-07-04 | End: 2024-07-06 | Stop reason: HOSPADM

## 2024-07-03 RX ADMIN — SODIUM CHLORIDE: 9 INJECTION, SOLUTION INTRAVENOUS at 08:07

## 2024-07-03 RX ADMIN — PANTOPRAZOLE SODIUM 40 MG: 40 INJECTION, POWDER, LYOPHILIZED, FOR SOLUTION INTRAVENOUS at 05:07

## 2024-07-03 NOTE — ASSESSMENT & PLAN NOTE
Patient's anemia is currently uncontrolled. Has received 2 units of PRBCs on 7/3/2024 . Etiology likely d/t acute blood loss which was from GI bleed  Current CBC reviewed-   Lab Results   Component Value Date    HGB 6.0 (LL) 07/03/2024    HCT 18.3 (LL) 07/03/2024     Monitor serial CBC and transfuse if patient becomes hemodynamically unstable, symptomatic or H/H drops below 7/21.

## 2024-07-03 NOTE — ED PROVIDER NOTES
Encounter Date: 7/3/2024       History     Chief Complaint   Patient presents with    Fatigue     Reports recently admitted for blood in stool and feeling weak      59-year-old male with a past medical history of hypertension and sleep apnea presents for evaluation of blood in his stool.  The patient reports that he started with blood in his stool yesterday.  He reports his stool is black.  He reports some associated fatigue, shortness of breath, and chest pain with exertion.  He reports a recent admission for a GI bleed where he required a blood transfusion and discharge 2 days ago.  The patient did not take his home Protonix today but did take it yesterday.  There are no alleviating or aggravating factors.  The patient is not on any blood thinners.      Review of patient's allergies indicates:  No Known Allergies  Past Medical History:   Diagnosis Date    Hypertension     Sleep apnea      Past Surgical History:   Procedure Laterality Date    COLONOSCOPY N/A 04/24/2023    Procedure: COLONOSCOPY;  Surgeon: Yunior Major MD;  Location: [facility];  Service: Endoscopy;  Laterality: N/A;  lm/ppm    ESOPHAGOGASTRODUODENOSCOPY  2012    ESOPHAGOGASTRODUODENOSCOPY N/A 7/6/2023    Procedure: EGD (ESOPHAGOGASTRODUODENOSCOPY);  Surgeon: Yunior Major MD;  Location: [facility];  Service: Endoscopy;  Laterality: N/A;    ESOPHAGOGASTRODUODENOSCOPY N/A 6/29/2024    Procedure: EGD (ESOPHAGOGASTRODUODENOSCOPY);  Surgeon: Kaitlyn Pruitt MD;  Location: [facility];  Service: Endoscopy;  Laterality: N/A;    INJECTION OF ANESTHETIC AGENT AROUND MEDIAL BRANCH NERVES INNERVATING LUMBAR FACET JOINT Left 05/16/2023    Procedure: Block-nerve-medial branch-lumbar;  Surgeon: Syed Betancourt MD;  Location: [facility] OR;  Service: Pain Management;  Laterality: Left;  L3,4,5    NECK SURGERY       Family History   Problem Relation Name Age of Onset    Cancer Mother          lung cancer    Alcohol abuse Father      Cancer Maternal Grandfather           Throat cancer     Social History     Tobacco Use    Smoking status: Former     Types: Cigarettes    Smokeless tobacco: Never   Substance Use Topics    Alcohol use: Never    Drug use: Never     Review of Systems   Constitutional:  Positive for fatigue. Negative for chills, diaphoresis and fever.   HENT:  Negative for congestion and rhinorrhea.    Respiratory:  Positive for shortness of breath. Negative for cough.    Cardiovascular:  Positive for chest pain.   Gastrointestinal:  Negative for abdominal pain, diarrhea, nausea and vomiting.   Genitourinary:  Negative for dysuria, frequency and testicular pain.   Musculoskeletal:  Negative for gait problem.   Skin:  Negative for color change.   Neurological:  Negative for dizziness and numbness.   Psychiatric/Behavioral:  Negative for agitation and confusion.        Physical Exam     Initial Vitals [07/03/24 1538]   BP Pulse Resp Temp SpO2   117/64 87 17 98.5 °F (36.9 °C) 98 %      MAP       --         Physical Exam    Nursing note and vitals reviewed.  Constitutional: He appears well-developed and well-nourished.   HENT:   Head: Normocephalic and atraumatic.   Eyes: EOM are normal. Pupils are equal, round, and reactive to light.   Neck: Neck supple.   Cardiovascular:  Normal rate and regular rhythm.           Pulmonary/Chest: Breath sounds normal.   Abdominal: Abdomen is soft. Bowel sounds are normal. There is abdominal tenderness.   Epigastric and left upper quadrant abdominal tenderness to palpation.   Musculoskeletal:         General: Normal range of motion.      Cervical back: Neck supple.     Neurological: He is alert and oriented to person, place, and time.   Skin: Skin is warm and dry.   Psychiatric: He has a normal mood and affect.         ED Course   Critical Care    Date/Time: 7/3/2024 6:00 PM    Performed by: Andrew Melo MD  Authorized by: Andrew Melo MD  Direct patient critical care time: 20 minutes  Ordering / reviewing critical care  time: 11 minutes  Documentation critical care time: 13 minutes  Total critical care time (exclusive of procedural time) : 44 minutes  Critical care was time spent personally by me on the following activities: development of treatment plan with patient or surrogate, examination of patient, obtaining history from patient or surrogate, ordering and performing treatments and interventions and ordering and review of laboratory studies.        Labs Reviewed   CBC W/ AUTO DIFFERENTIAL - Abnormal; Notable for the following components:       Result Value    RBC 1.86 (*)     Hemoglobin 6.0 (*)     Hematocrit 18.3 (*)     MCH 32.3 (*)     RDW 14.6 (*)     Immature Granulocytes 2.8 (*)     Immature Grans (Abs) 0.20 (*)     nRBC 1 (*)     All other components within normal limits    Narrative:     H&H  critical result(s) called and verbal readback obtained from Remigio Ames RN. by [facility] 07/03/2024 16:22   COMPREHENSIVE METABOLIC PANEL - Abnormal; Notable for the following components:    Sodium 135 (*)     BUN 22 (*)     Calcium 8.2 (*)     Total Protein 5.1 (*)     Albumin 3.0 (*)     Anion Gap 4 (*)     All other components within normal limits   CBC W/ AUTO DIFFERENTIAL   TYPE & SCREEN   PREPARE RBC SOFT     EKG Readings: (Independently Interpreted)   Initial Reading: No STEMI. Rhythm: Normal Sinus Rhythm. Heart Rate: 83. Ectopy: No Ectopy. Conduction: Normal. ST Segments: Normal ST Segments. T Waves: Normal. Clinical Impression: Normal Sinus Rhythm       Imaging Results    None          Medications   pantoprazole injection 40 mg (has no administration in time range)   pantoprazole injection 40 mg (40 mg Intravenous Given 7/3/24 1708)   0.9%  NaCl infusion ( Intravenous New Bag 7/3/24 2023)     Medical Decision Making  59-year-old male presented with multiple complaints.    Initial differential diagnosis included but not limited to peptic ulcer disease, upper GI bleed, and gastritis.    Amount and/or Complexity of Data  Reviewed  Labs: ordered.    Risk  Prescription drug management.  Risk Details: The patient was emergently evaluated in the emergency department, his evaluation was significant for a middle-age male who is pale appearing and has some abdominal tenderness.  The patient's labs were concerning for severe anemia.  The patient is likely having an upper GI bleed.  The patient was treated with IV Protonix and we have started blood transfusions in this patient.  The case was discussed with the patient's GI doctor, Dr. Pruitt.  She has asked for the patient to be kept NPO after midnight.  I will admit him to the hospitalist service for further care.  The case was discussed with the nurse practitioner on-call for the hospitalist service.  He has accepted the patient for admission.                                      Clinical Impression:  Final diagnoses:  [R06.02] SOB (shortness of breath)  [K92.2] Gastrointestinal hemorrhage, unspecified gastrointestinal hemorrhage type (Primary)          ED Disposition Condition    Observation                 Andrew Melo MD  07/03/24 2028

## 2024-07-03 NOTE — SUBJECTIVE & OBJECTIVE
Past Medical History:   Diagnosis Date    Hypertension     Sleep apnea        Past Surgical History:   Procedure Laterality Date    COLONOSCOPY N/A 04/24/2023    Procedure: COLONOSCOPY;  Surgeon: Yuinor Major MD;  Location: [facility];  Service: Endoscopy;  Laterality: N/A;  lm/ppm    ESOPHAGOGASTRODUODENOSCOPY  2012    ESOPHAGOGASTRODUODENOSCOPY N/A 7/6/2023    Procedure: EGD (ESOPHAGOGASTRODUODENOSCOPY);  Surgeon: Yunior Major MD;  Location: [facility];  Service: Endoscopy;  Laterality: N/A;    ESOPHAGOGASTRODUODENOSCOPY N/A 6/29/2024    Procedure: EGD (ESOPHAGOGASTRODUODENOSCOPY);  Surgeon: Kaitlyn Pruitt MD;  Location: [facility];  Service: Endoscopy;  Laterality: N/A;    INJECTION OF ANESTHETIC AGENT AROUND MEDIAL BRANCH NERVES INNERVATING LUMBAR FACET JOINT Left 05/16/2023    Procedure: Block-nerve-medial branch-lumbar;  Surgeon: Syed Betancourt MD;  Location: [facility] OR;  Service: Pain Management;  Laterality: Left;  L3,4,5    NECK SURGERY         Review of patient's allergies indicates:  No Known Allergies    No current facility-administered medications on file prior to encounter.     Current Outpatient Medications on File Prior to Encounter   Medication Sig    amLODIPine (NORVASC) 10 MG tablet TAKE 1 TABLET BY MOUTH EVERY DAY (Patient taking differently: Take 10 mg by mouth once daily.)    celecoxib (CELEBREX) 200 MG capsule Take 200 mg by mouth 2 (two) times daily.    methylPREDNISolone (MEDROL DOSEPACK) 4 mg tablet Take 4 mg by mouth As instructed (Follow package instructions).    oxyCODONE-acetaminophen (PERCOCET) 7.5-325 mg per tablet Take 1 tablet by mouth 3 (three) times daily.    pantoprazole (PROTONIX) 40 MG tablet Take 1 tablet (40 mg total) by mouth 2 (two) times daily.    pregabalin (LYRICA) 200 MG Cap Take 200 mg by mouth once daily.    tiZANidine (ZANAFLEX) 4 MG tablet Take 4 mg by mouth 2 (two) times daily.    lamoTRIgine (LAMICTAL) 25 MG tablet TAKE TWO TABLETS BY MOUTH TWICE DAILY  (Patient not taking: Reported on 7/3/2024)    [DISCONTINUED] EScitalopram oxalate (LEXAPRO) 20 MG tablet Take 1 tablet (20 mg total) by mouth once daily. (Patient not taking: Reported on 7/3/2024)    [DISCONTINUED] triamcinolone acetonide 0.1% (KENALOG) 0.1 % cream Apply topically 2 (two) times daily.     Family History       Problem Relation (Age of Onset)    Alcohol abuse Father    Cancer Mother, Maternal Grandfather          Tobacco Use    Smoking status: Former     Types: Cigarettes    Smokeless tobacco: Never   Substance and Sexual Activity    Alcohol use: Never    Drug use: Never    Sexual activity: Yes     Partners: Female     Review of Systems   Constitutional:  Positive for activity change, appetite change and fatigue. Negative for chills, diaphoresis and fever.   HENT:  Negative for congestion, nosebleeds and tinnitus.    Eyes:  Negative for photophobia and visual disturbance.   Respiratory:  Positive for shortness of breath. Negative for cough, chest tightness and wheezing.    Cardiovascular:  Negative for chest pain, palpitations and leg swelling.   Gastrointestinal:  Negative for abdominal distention, abdominal pain, constipation, diarrhea, nausea and vomiting.   Endocrine: Negative for cold intolerance and heat intolerance.   Genitourinary:  Negative for difficulty urinating, dysuria, frequency, hematuria and urgency.   Musculoskeletal:  Negative for arthralgias, back pain and myalgias.   Skin:  Negative for pallor, rash and wound.   Allergic/Immunologic: Negative for immunocompromised state.   Neurological:  Positive for weakness. Negative for dizziness, tremors, facial asymmetry and speech difficulty.   Hematological:  Negative for adenopathy. Does not bruise/bleed easily.   Psychiatric/Behavioral:  Negative for confusion and sleep disturbance. The patient is not nervous/anxious.      Objective:     Vital Signs (Most Recent):  Temp: 98.5 °F (36.9 °C) (07/03/24 1538)  Pulse: 80 (07/03/24 1644)  Resp:  18 (07/03/24 1610)  BP: 124/60 (07/03/24 1632)  SpO2: 97 % (07/03/24 1644) Vital Signs (24h Range):  Temp:  [98.5 °F (36.9 °C)] 98.5 °F (36.9 °C)  Pulse:  [78-87] 80  Resp:  [1-18] 18  SpO2:  [97 %-100 %] 97 %  BP: (117-127)/(60-73) 124/60     Weight: 88.9 kg (196 lb)  Body mass index is 26.58 kg/m².     Physical Exam  Vitals and nursing note reviewed.   Constitutional:       General: He is not in acute distress.     Appearance: He is well-developed. He is not diaphoretic.   HENT:      Head: Normocephalic.      Mouth/Throat:      Mouth: Mucous membranes are dry.   Eyes:      General: No scleral icterus.     Conjunctiva/sclera: Conjunctivae normal.      Pupils: Pupils are equal, round, and reactive to light.      Comments: Conjunctival pallor   Neck:      Vascular: No JVD.   Cardiovascular:      Rate and Rhythm: Normal rate and regular rhythm.      Heart sounds: Normal heart sounds. No murmur heard.     No friction rub. No gallop.   Pulmonary:      Effort: Pulmonary effort is normal. No respiratory distress.      Breath sounds: Normal breath sounds. No wheezing or rales.   Abdominal:      General: Bowel sounds are normal. There is no distension.      Palpations: Abdomen is soft.      Tenderness: There is no abdominal tenderness. There is no guarding or rebound.   Musculoskeletal:         General: No tenderness. Normal range of motion.      Cervical back: Normal range of motion and neck supple.   Lymphadenopathy:      Cervical: No cervical adenopathy.   Skin:     General: Skin is warm and dry.      Capillary Refill: Capillary refill takes less than 2 seconds.      Coloration: Skin is pale.      Findings: No erythema or rash.   Neurological:      Mental Status: He is alert and oriented to person, place, and time.      Cranial Nerves: No cranial nerve deficit.      Sensory: No sensory deficit.      Coordination: Coordination normal.      Deep Tendon Reflexes: Reflexes normal.   Psychiatric:         Behavior: Behavior  normal.         Thought Content: Thought content normal.         Judgment: Judgment normal.              CRANIAL NERVES     CN III, IV, VI   Pupils are equal, round, and reactive to light.       Significant Labs: All pertinent labs within the past 24 hours have been reviewed.  CBC:   Recent Labs   Lab 07/03/24  1613   WBC 7.23   HGB 6.0*   HCT 18.3*        CMP:   Recent Labs   Lab 07/03/24  1613   *   K 3.9      CO2 24   GLU 95   BUN 22*   CREATININE 0.7   CALCIUM 8.2*   PROT 5.1*   ALBUMIN 3.0*   BILITOT 0.1   ALKPHOS 62   AST 12   ALT 16   ANIONGAP 4*       Significant Imaging: I have reviewed all pertinent imaging results/findings within the past 24 hours.

## 2024-07-03 NOTE — HPI
Jerod Barrientos is a 59-year-old male who presents emergency room for evaluation will check stools, exertional shortness a breath, and exertional chest pain.  He was recently admitted for GI bleed which required a blood transfusion.  EGD at that time failed to demonstrate any active bleeding.  He does endorse smoking cigarettes and he uses Goody powders.  He denies any significant alcohol use.  Previous medical history includes diverticulitis, depression, smoker, and obstructive sleep apnea.  ER workup: CBC with H&H of 6/18.  CMP unremarkable.  Type and screen obtained.  EKG demonstrated sinus rhythm without evidence of STEMI or ST or T-wave abnormalities.  GI was consulted.  GI recommended NPO after midnight for EGD in a.m..  Patient be transfused 2 units of packed red blood cells.  Patient admitted to Hospital Medicine for treatment management

## 2024-07-03 NOTE — H&P
[facility] Medicine  History & Physical    Patient Name: Jerod Barrientos  MRN: [id number]  Patient Class: OP- Observation  Admission Date: 7/3/2024  Attending Physician: Anitha Warren MD   Primary Care Provider: Sai Mccracken III, MD         Patient information was obtained from patient, spouse/SO, past medical records, and ER records.     Subjective:     Principal Problem:GI bleed    Chief Complaint:   Chief Complaint   Patient presents with    Fatigue     Reports recently admitted for blood in stool and feeling weak         HPI: Jerod Barrientos is a 59-year-old male who presents emergency room for evaluation will check stools, exertional shortness a breath, and exertional chest pain.  He was recently admitted for GI bleed which required a blood transfusion.  EGD at that time failed to demonstrate any active bleeding.  He does endorse smoking cigarettes and he uses Goody powders.  He denies any significant alcohol use.  Previous medical history includes diverticulitis, depression, smoker, and obstructive sleep apnea.  ER workup: CBC with H&H of 6/18.  CMP unremarkable.  Type and screen obtained.  EKG demonstrated sinus rhythm without evidence of STEMI or ST or T-wave abnormalities.  GI was consulted.  GI recommended NPO after midnight for EGD in a.m..  Patient be transfused 2 units of packed red blood cells.  Patient admitted to Hospital Medicine for treatment management            Past Medical History:   Diagnosis Date    Hypertension     Sleep apnea        Past Surgical History:   Procedure Laterality Date    COLONOSCOPY N/A 04/24/2023    Procedure: COLONOSCOPY;  Surgeon: Yunior Major MD;  Location: [facility];  Service: Endoscopy;  Laterality: N/A;  lm/ppm    ESOPHAGOGASTRODUODENOSCOPY  2012    ESOPHAGOGASTRODUODENOSCOPY N/A 7/6/2023    Procedure: EGD (ESOPHAGOGASTRODUODENOSCOPY);  Surgeon: Yunior Major MD;  Location: [facility];  Service: Endoscopy;  Laterality: N/A;     ESOPHAGOGASTRODUODENOSCOPY N/A 6/29/2024    Procedure: EGD (ESOPHAGOGASTRODUODENOSCOPY);  Surgeon: Kaitlyn Pruitt MD;  Location: [facility];  Service: Endoscopy;  Laterality: N/A;    INJECTION OF ANESTHETIC AGENT AROUND MEDIAL BRANCH NERVES INNERVATING LUMBAR FACET JOINT Left 05/16/2023    Procedure: Block-nerve-medial branch-lumbar;  Surgeon: Syed Betancourt MD;  Location: [facility];  Service: Pain Management;  Laterality: Left;  L3,4,5    NECK SURGERY         Review of patient's allergies indicates:  No Known Allergies    No current facility-administered medications on file prior to encounter.     Current Outpatient Medications on File Prior to Encounter   Medication Sig    amLODIPine (NORVASC) 10 MG tablet TAKE 1 TABLET BY MOUTH EVERY DAY (Patient taking differently: Take 10 mg by mouth once daily.)    celecoxib (CELEBREX) 200 MG capsule Take 200 mg by mouth 2 (two) times daily.    methylPREDNISolone (MEDROL DOSEPACK) 4 mg tablet Take 4 mg by mouth As instructed (Follow package instructions).    oxyCODONE-acetaminophen (PERCOCET) 7.5-325 mg per tablet Take 1 tablet by mouth 3 (three) times daily.    pantoprazole (PROTONIX) 40 MG tablet Take 1 tablet (40 mg total) by mouth 2 (two) times daily.    pregabalin (LYRICA) 200 MG Cap Take 200 mg by mouth once daily.    tiZANidine (ZANAFLEX) 4 MG tablet Take 4 mg by mouth 2 (two) times daily.    lamoTRIgine (LAMICTAL) 25 MG tablet TAKE TWO TABLETS BY MOUTH TWICE DAILY (Patient not taking: Reported on 7/3/2024)    [DISCONTINUED] EScitalopram oxalate (LEXAPRO) 20 MG tablet Take 1 tablet (20 mg total) by mouth once daily. (Patient not taking: Reported on 7/3/2024)    [DISCONTINUED] triamcinolone acetonide 0.1% (KENALOG) 0.1 % cream Apply topically 2 (two) times daily.     Family History       Problem Relation (Age of Onset)    Alcohol abuse Father    Cancer Mother, Maternal Grandfather          Tobacco Use    Smoking status: Former     Types: Cigarettes    Smokeless  tobacco: Never   Substance and Sexual Activity    Alcohol use: Never    Drug use: Never    Sexual activity: Yes     Partners: Female     Review of Systems   Constitutional:  Positive for activity change, appetite change and fatigue. Negative for chills, diaphoresis and fever.   HENT:  Negative for congestion, nosebleeds and tinnitus.    Eyes:  Negative for photophobia and visual disturbance.   Respiratory:  Positive for shortness of breath. Negative for cough, chest tightness and wheezing.    Cardiovascular:  Negative for chest pain, palpitations and leg swelling.   Gastrointestinal:  Negative for abdominal distention, abdominal pain, constipation, diarrhea, nausea and vomiting.   Endocrine: Negative for cold intolerance and heat intolerance.   Genitourinary:  Negative for difficulty urinating, dysuria, frequency, hematuria and urgency.   Musculoskeletal:  Negative for arthralgias, back pain and myalgias.   Skin:  Negative for pallor, rash and wound.   Allergic/Immunologic: Negative for immunocompromised state.   Neurological:  Positive for weakness. Negative for dizziness, tremors, facial asymmetry and speech difficulty.   Hematological:  Negative for adenopathy. Does not bruise/bleed easily.   Psychiatric/Behavioral:  Negative for confusion and sleep disturbance. The patient is not nervous/anxious.      Objective:     Vital Signs (Most Recent):  Temp: 98.5 °F (36.9 °C) (07/03/24 1538)  Pulse: 80 (07/03/24 1644)  Resp: 18 (07/03/24 1610)  BP: 124/60 (07/03/24 1632)  SpO2: 97 % (07/03/24 1644) Vital Signs (24h Range):  Temp:  [98.5 °F (36.9 °C)] 98.5 °F (36.9 °C)  Pulse:  [78-87] 80  Resp:  [1-18] 18  SpO2:  [97 %-100 %] 97 %  BP: (117-127)/(60-73) 124/60     Weight: 88.9 kg (196 lb)  Body mass index is 26.58 kg/m².     Physical Exam  Vitals and nursing note reviewed.   Constitutional:       General: He is not in acute distress.     Appearance: He is well-developed. He is not diaphoretic.   HENT:      Head:  Normocephalic.      Mouth/Throat:      Mouth: Mucous membranes are dry.   Eyes:      General: No scleral icterus.     Conjunctiva/sclera: Conjunctivae normal.      Pupils: Pupils are equal, round, and reactive to light.      Comments: Conjunctival pallor   Neck:      Vascular: No JVD.   Cardiovascular:      Rate and Rhythm: Normal rate and regular rhythm.      Heart sounds: Normal heart sounds. No murmur heard.     No friction rub. No gallop.   Pulmonary:      Effort: Pulmonary effort is normal. No respiratory distress.      Breath sounds: Normal breath sounds. No wheezing or rales.   Abdominal:      General: Bowel sounds are normal. There is no distension.      Palpations: Abdomen is soft.      Tenderness: There is no abdominal tenderness. There is no guarding or rebound.   Musculoskeletal:         General: No tenderness. Normal range of motion.      Cervical back: Normal range of motion and neck supple.   Lymphadenopathy:      Cervical: No cervical adenopathy.   Skin:     General: Skin is warm and dry.      Capillary Refill: Capillary refill takes less than 2 seconds.      Coloration: Skin is pale.      Findings: No erythema or rash.   Neurological:      Mental Status: He is alert and oriented to person, place, and time.      Cranial Nerves: No cranial nerve deficit.      Sensory: No sensory deficit.      Coordination: Coordination normal.      Deep Tendon Reflexes: Reflexes normal.   Psychiatric:         Behavior: Behavior normal.         Thought Content: Thought content normal.         Judgment: Judgment normal.              CRANIAL NERVES     CN III, IV, VI   Pupils are equal, round, and reactive to light.       Significant Labs: All pertinent labs within the past 24 hours have been reviewed.  CBC:   Recent Labs   Lab 07/03/24  1613   WBC 7.23   HGB 6.0*   HCT 18.3*        CMP:   Recent Labs   Lab 07/03/24  1613   *   K 3.9      CO2 24   GLU 95   BUN 22*   CREATININE 0.7   CALCIUM 8.2*   PROT  5.1*   ALBUMIN 3.0*   BILITOT 0.1   ALKPHOS 62   AST 12   ALT 16   ANIONGAP 4*       Significant Imaging: I have reviewed all pertinent imaging results/findings within the past 24 hours.  Assessment/Plan:     * GI bleed  Patient has acute blood loss due to hemorrhage, the hemorrhage is due to gastrointestinal bleed, patient does not have a propensity for bleeding.. Will trend hemoglobin/hematocrit Every 4 hours, as well as monitor and correct for any coagulation defects. CBC and vital signs have been reviewed and last CBC was noted-   Lab Results   Component Value Date    WBC 7.23 07/03/2024    HGB 6.0 (LL) 07/03/2024    HCT 18.3 (LL) 07/03/2024    MCV 98 07/03/2024     07/03/2024         Will order a type and screen and consent patient for blood transfusion.  Will transfuse patient with 2 units of packed red blood cells tonight    Symptomatic anemia  Patient's anemia is currently uncontrolled. Has received 2 units of PRBCs on 7/3/2024 . Etiology likely d/t acute blood loss which was from GI bleed  Current CBC reviewed-   Lab Results   Component Value Date    HGB 6.0 (LL) 07/03/2024    HCT 18.3 (LL) 07/03/2024     Monitor serial CBC and transfuse if patient becomes hemodynamically unstable, symptomatic or H/H drops below 7/21.      VTE Risk Mitigation (From admission, onward)      None                 On 07/03/2024, patient should be placed in hospital observation services under my care in collaboration with Dr. Warren.           Jose C Coto NP  Department of Hospital Medicine  [facility]

## 2024-07-03 NOTE — ASSESSMENT & PLAN NOTE
Patient has acute blood loss due to hemorrhage, the hemorrhage is due to gastrointestinal bleed, patient does not have a propensity for bleeding.. Will trend hemoglobin/hematocrit Every 4 hours, as well as monitor and correct for any coagulation defects. CBC and vital signs have been reviewed and last CBC was noted-   Lab Results   Component Value Date    WBC 7.23 07/03/2024    HGB 6.0 (LL) 07/03/2024    HCT 18.3 (LL) 07/03/2024    MCV 98 07/03/2024     07/03/2024         Will order a type and screen and consent patient for blood transfusion.  Will transfuse patient with 2 units of packed red blood cells tonight

## 2024-07-04 ENCOUNTER — ANESTHESIA EVENT (OUTPATIENT)
Dept: ENDOSCOPY | Facility: HOSPITAL | Age: 59
End: 2024-07-04
Payer: COMMERCIAL

## 2024-07-04 ENCOUNTER — ANESTHESIA (OUTPATIENT)
Dept: ENDOSCOPY | Facility: HOSPITAL | Age: 59
End: 2024-07-04
Payer: COMMERCIAL

## 2024-07-04 LAB
BASOPHILS # BLD AUTO: 0.03 K/UL (ref 0–0.2)
BASOPHILS NFR BLD: 0.5 % (ref 0–1.9)
BLD PROD TYP BPU: NORMAL
BLOOD UNIT EXPIRATION DATE: NORMAL
BLOOD UNIT TYPE CODE: 5100
BLOOD UNIT TYPE: NORMAL
CODING SYSTEM: NORMAL
CROSSMATCH INTERPRETATION: NORMAL
DIFFERENTIAL METHOD BLD: ABNORMAL
DISPENSE STATUS: NORMAL
EOSINOPHIL # BLD AUTO: 0.1 K/UL (ref 0–0.5)
EOSINOPHIL NFR BLD: 1.8 % (ref 0–8)
ERYTHROCYTE [DISTWIDTH] IN BLOOD BY AUTOMATED COUNT: 15.1 % (ref 11.5–14.5)
HCT VFR BLD AUTO: 21 % (ref 40–54)
HGB BLD-MCNC: 7.2 G/DL (ref 14–18)
IMM GRANULOCYTES # BLD AUTO: 0.16 K/UL (ref 0–0.04)
IMM GRANULOCYTES NFR BLD AUTO: 2.6 % (ref 0–0.5)
LYMPHOCYTES # BLD AUTO: 1.7 K/UL (ref 1–4.8)
LYMPHOCYTES NFR BLD: 27.3 % (ref 18–48)
MCH RBC QN AUTO: 32.1 PG (ref 27–31)
MCHC RBC AUTO-ENTMCNC: 34.3 G/DL (ref 32–36)
MCV RBC AUTO: 94 FL (ref 82–98)
MONOCYTES # BLD AUTO: 0.5 K/UL (ref 0.3–1)
MONOCYTES NFR BLD: 7.5 % (ref 4–15)
NEUTROPHILS # BLD AUTO: 3.8 K/UL (ref 1.8–7.7)
NEUTROPHILS NFR BLD: 60.3 % (ref 38–73)
NRBC BLD-RTO: 0 /100 WBC
NUM UNITS TRANS PACKED RBC: NORMAL
PLATELET # BLD AUTO: 199 K/UL (ref 150–450)
PMV BLD AUTO: 10.2 FL (ref 9.2–12.9)
RBC # BLD AUTO: 2.24 M/UL (ref 4.6–6.2)
WBC # BLD AUTO: 6.27 K/UL (ref 3.9–12.7)

## 2024-07-04 PROCEDURE — 37000009 HC ANESTHESIA EA ADD 15 MINS: Performed by: INTERNAL MEDICINE

## 2024-07-04 PROCEDURE — 63600175 PHARM REV CODE 636 W HCPCS

## 2024-07-04 PROCEDURE — 25000003 PHARM REV CODE 250: Performed by: NURSE ANESTHETIST, CERTIFIED REGISTERED

## 2024-07-04 PROCEDURE — 36430 TRANSFUSION BLD/BLD COMPNT: CPT

## 2024-07-04 PROCEDURE — 37000008 HC ANESTHESIA 1ST 15 MINUTES: Performed by: INTERNAL MEDICINE

## 2024-07-04 PROCEDURE — 96375 TX/PRO/DX INJ NEW DRUG ADDON: CPT

## 2024-07-04 PROCEDURE — 96376 TX/PRO/DX INJ SAME DRUG ADON: CPT

## 2024-07-04 PROCEDURE — 63600175 PHARM REV CODE 636 W HCPCS: Performed by: HOSPITALIST

## 2024-07-04 PROCEDURE — 0DB68ZX EXCISION OF STOMACH, VIA NATURAL OR ARTIFICIAL OPENING ENDOSCOPIC, DIAGNOSTIC: ICD-10-PCS | Performed by: INTERNAL MEDICINE

## 2024-07-04 PROCEDURE — 86920 COMPATIBILITY TEST SPIN: CPT | Performed by: HOSPITALIST

## 2024-07-04 PROCEDURE — 63600175 PHARM REV CODE 636 W HCPCS: Performed by: NURSE ANESTHETIST, CERTIFIED REGISTERED

## 2024-07-04 PROCEDURE — 99900031 HC PATIENT EDUCATION (STAT)

## 2024-07-04 PROCEDURE — 25000003 PHARM REV CODE 250: Performed by: HOSPITALIST

## 2024-07-04 PROCEDURE — 43239 EGD BIOPSY SINGLE/MULTIPLE: CPT | Mod: ,,, | Performed by: INTERNAL MEDICINE

## 2024-07-04 PROCEDURE — 43239 EGD BIOPSY SINGLE/MULTIPLE: CPT | Performed by: INTERNAL MEDICINE

## 2024-07-04 PROCEDURE — 27201012 HC FORCEPS, HOT/COLD, DISP: Performed by: INTERNAL MEDICINE

## 2024-07-04 PROCEDURE — P9016 RBC LEUKOCYTES REDUCED: HCPCS | Performed by: HOSPITALIST

## 2024-07-04 PROCEDURE — 94761 N-INVAS EAR/PLS OXIMETRY MLT: CPT

## 2024-07-04 PROCEDURE — 63600175 PHARM REV CODE 636 W HCPCS: Performed by: NURSE PRACTITIONER

## 2024-07-04 PROCEDURE — 85025 COMPLETE CBC W/AUTO DIFF WBC: CPT | Performed by: NURSE PRACTITIONER

## 2024-07-04 PROCEDURE — 25500020 PHARM REV CODE 255

## 2024-07-04 PROCEDURE — 11000001 HC ACUTE MED/SURG PRIVATE ROOM

## 2024-07-04 PROCEDURE — 25000003 PHARM REV CODE 250: Performed by: INTERNAL MEDICINE

## 2024-07-04 PROCEDURE — 36415 COLL VENOUS BLD VENIPUNCTURE: CPT | Performed by: NURSE PRACTITIONER

## 2024-07-04 RX ORDER — OXYCODONE AND ACETAMINOPHEN 7.5; 325 MG/1; MG/1
1 TABLET ORAL EVERY 4 HOURS PRN
Status: DISCONTINUED | OUTPATIENT
Start: 2024-07-04 | End: 2024-07-06 | Stop reason: HOSPADM

## 2024-07-04 RX ORDER — SODIUM CHLORIDE 9 MG/ML
INJECTION, SOLUTION INTRAVENOUS CONTINUOUS
Status: DISCONTINUED | OUTPATIENT
Start: 2024-07-04 | End: 2024-07-04

## 2024-07-04 RX ORDER — KETAMINE HCL IN 0.9 % NACL 50 MG/5 ML
SYRINGE (ML) INTRAVENOUS
Status: DISCONTINUED | OUTPATIENT
Start: 2024-07-04 | End: 2024-07-04

## 2024-07-04 RX ORDER — LIDOCAINE HYDROCHLORIDE 20 MG/ML
INJECTION INTRAVENOUS
Status: DISCONTINUED | OUTPATIENT
Start: 2024-07-04 | End: 2024-07-04

## 2024-07-04 RX ORDER — SUCRALFATE 1 G/1
2 TABLET ORAL
Status: DISCONTINUED | OUTPATIENT
Start: 2024-07-04 | End: 2024-07-06 | Stop reason: HOSPADM

## 2024-07-04 RX ORDER — MORPHINE SULFATE 4 MG/ML
4 INJECTION, SOLUTION INTRAMUSCULAR; INTRAVENOUS EVERY 4 HOURS PRN
Status: DISCONTINUED | OUTPATIENT
Start: 2024-07-04 | End: 2024-07-04 | Stop reason: SDUPTHER

## 2024-07-04 RX ORDER — HYDROMORPHONE HYDROCHLORIDE 1 MG/ML
0.5 INJECTION, SOLUTION INTRAMUSCULAR; INTRAVENOUS; SUBCUTANEOUS ONCE
Status: COMPLETED | OUTPATIENT
Start: 2024-07-04 | End: 2024-07-04

## 2024-07-04 RX ORDER — HYDROCODONE BITARTRATE AND ACETAMINOPHEN 500; 5 MG/1; MG/1
TABLET ORAL
Status: DISCONTINUED | OUTPATIENT
Start: 2024-07-04 | End: 2024-07-06 | Stop reason: HOSPADM

## 2024-07-04 RX ORDER — MORPHINE SULFATE 4 MG/ML
2 INJECTION, SOLUTION INTRAMUSCULAR; INTRAVENOUS EVERY 6 HOURS PRN
Status: DISCONTINUED | OUTPATIENT
Start: 2024-07-04 | End: 2024-07-04

## 2024-07-04 RX ORDER — PROPOFOL 10 MG/ML
VIAL (ML) INTRAVENOUS
Status: DISCONTINUED | OUTPATIENT
Start: 2024-07-04 | End: 2024-07-04

## 2024-07-04 RX ADMIN — PANTOPRAZOLE SODIUM 40 MG: 40 INJECTION, POWDER, LYOPHILIZED, FOR SOLUTION INTRAVENOUS at 08:07

## 2024-07-04 RX ADMIN — OXYCODONE AND ACETAMINOPHEN 1 TABLET: 7.5; 325 TABLET ORAL at 08:07

## 2024-07-04 RX ADMIN — PROPOFOL 100 MG: 10 INJECTION, EMULSION INTRAVENOUS at 11:07

## 2024-07-04 RX ADMIN — PROPOFOL 50 MG: 10 INJECTION, EMULSION INTRAVENOUS at 11:07

## 2024-07-04 RX ADMIN — Medication 10 MG: at 11:07

## 2024-07-04 RX ADMIN — OXYCODONE AND ACETAMINOPHEN 1 TABLET: 7.5; 325 TABLET ORAL at 02:07

## 2024-07-04 RX ADMIN — MORPHINE SULFATE 4 MG: 4 INJECTION INTRAVENOUS at 10:07

## 2024-07-04 RX ADMIN — SUCRALFATE 2 G: 1 TABLET ORAL at 05:07

## 2024-07-04 RX ADMIN — HYDROMORPHONE HYDROCHLORIDE 0.5 MG: 0.5 INJECTION, SOLUTION INTRAMUSCULAR; INTRAVENOUS; SUBCUTANEOUS at 01:07

## 2024-07-04 RX ADMIN — IOHEXOL 100 ML: 350 INJECTION, SOLUTION INTRAVENOUS at 07:07

## 2024-07-04 RX ADMIN — GLYCOPYRROLATE 0.2 MG: 0.2 INJECTION, SOLUTION INTRAMUSCULAR; INTRAVITREAL at 11:07

## 2024-07-04 RX ADMIN — SUCRALFATE 2 G: 1 TABLET ORAL at 08:07

## 2024-07-04 RX ADMIN — PANTOPRAZOLE SODIUM 40 MG: 40 INJECTION, POWDER, LYOPHILIZED, FOR SOLUTION INTRAVENOUS at 06:07

## 2024-07-04 RX ADMIN — LIDOCAINE HYDROCHLORIDE 75 MG: 20 INJECTION, SOLUTION INTRAVENOUS at 11:07

## 2024-07-04 RX ADMIN — SUCRALFATE 2 G: 1 TABLET ORAL at 12:07

## 2024-07-04 NOTE — NURSING
Dr. Cage at bedside. MD stated to keep patient on clear liquids today, npo after midnight just in case patient needs another scope tomorrow.

## 2024-07-04 NOTE — CARE UPDATE
07/04/24 0735   Patient Assessment/Suction   Level of Consciousness (AVPU) alert   Respiratory Effort Normal;Unlabored   Rhythm/Pattern, Respiratory unlabored;no shortness of breath reported   PRE-TX-O2   Device (Oxygen Therapy) room air   SpO2 98 %   Pulse Oximetry Type Intermittent   $ Pulse Oximetry - Multiple Charge Pulse Oximetry - Multiple   Pulse 65   Resp 18   Positioning HOB elevated 45 degrees   Education   $ Education 15 min

## 2024-07-04 NOTE — CARE UPDATE
07/03/24 0809   Patient Assessment/Suction   Level of Consciousness (AVPU) alert   Respiratory Effort Normal;Unlabored   PRE-TX-O2   Device (Oxygen Therapy) room air   SpO2 99 %   Pulse Oximetry Type Intermittent   Pulse 71   Resp 18

## 2024-07-04 NOTE — CONSULTS
History & Physical - Short Stay  Gastroenterology      SUBJECTIVE:     Procedure: Gastroscopy    Chief Complaint/Indication for Procedure:     History of Present Illness:      PTA Medications   Medication Sig    amLODIPine (NORVASC) 10 MG tablet TAKE 1 TABLET BY MOUTH EVERY DAY (Patient taking differently: Take 10 mg by mouth once daily.)    celecoxib (CELEBREX) 200 MG capsule Take 200 mg by mouth 2 (two) times daily.    methylPREDNISolone (MEDROL DOSEPACK) 4 mg tablet Take 4 mg by mouth As instructed (Follow package instructions).    oxyCODONE-acetaminophen (PERCOCET) 7.5-325 mg per tablet Take 1 tablet by mouth 3 (three) times daily.    pantoprazole (PROTONIX) 40 MG tablet Take 1 tablet (40 mg total) by mouth 2 (two) times daily.    tiZANidine (ZANAFLEX) 4 MG tablet Take 4 mg by mouth 2 (two) times daily.    lamoTRIgine (LAMICTAL) 25 MG tablet TAKE TWO TABLETS BY MOUTH TWICE DAILY (Patient not taking: Reported on 7/3/2024)    pregabalin (LYRICA) 200 MG Cap Take 200 mg by mouth once daily.       Review of patient's allergies indicates:  No Known Allergies     Past Medical History:   Diagnosis Date    Hypertension     Sleep apnea      Past Surgical History:   Procedure Laterality Date    COLONOSCOPY N/A 04/24/2023    Procedure: COLONOSCOPY;  Surgeon: Yunior Major MD;  Location: [facility];  Service: Endoscopy;  Laterality: N/A;  lm/ppm    ESOPHAGOGASTRODUODENOSCOPY  2012    ESOPHAGOGASTRODUODENOSCOPY N/A 7/6/2023    Procedure: EGD (ESOPHAGOGASTRODUODENOSCOPY);  Surgeon: Yunior Major MD;  Location: [facility];  Service: Endoscopy;  Laterality: N/A;    ESOPHAGOGASTRODUODENOSCOPY N/A 6/29/2024    Procedure: EGD (ESOPHAGOGASTRODUODENOSCOPY);  Surgeon: Kaitlyn Pruitt MD;  Location: [facility];  Service: Endoscopy;  Laterality: N/A;    INJECTION OF ANESTHETIC AGENT AROUND MEDIAL BRANCH NERVES INNERVATING LUMBAR FACET JOINT Left 05/16/2023    Procedure: Block-nerve-medial branch-lumbar;  Surgeon: Syed Betancourt,  MD;  Location: [facility];  Service: Pain Management;  Laterality: Left;  L3,4,5    NECK SURGERY       Family History   Problem Relation Name Age of Onset    Cancer Mother          lung cancer    Alcohol abuse Father      Cancer Maternal Grandfather          Throat cancer     Social History     Tobacco Use    Smoking status: Former     Types: Cigarettes    Smokeless tobacco: Never   Substance Use Topics    Alcohol use: Never    Drug use: Never         OBJECTIVE:     Vital Signs (Most Recent)  Temp: 98.2 °F (36.8 °C) (07/04/24 1115)  Pulse: 65 (07/04/24 1115)  Resp: 16 (07/04/24 1115)  BP: 132/61 (07/04/24 1115)  SpO2: 100 % (07/04/24 1115)    Physical Exam:  :                                                        GENERAL:  Comfortable, in no acute distress.                                 HEENT EXAM:  Nonicteric.  No adenopathy.  Oropharynx is clear.               NECK:  Supple.                                                               LUNGS:  Clear.                                                               CARDIAC:  Regular rate and rhythm.  S1, S2.  No murmur.                      ABDOMEN:  Soft, positive bowel sounds, nontender.  No hepatosplenomegaly or masses.  No rebound or guarding.                                             EXTREMITIES:  No edema.     MENTAL STATUS:  Alert and oriented.    ASSESSMENT/PLAN:     Assessment:     Plan: Gastroscopy    Anesthesia Plan:   MAC / General Anaesthesia    ASA Grade: {ASA GRADE:610895}    MALLAMPATI SCORE: {mallampati score:92295}

## 2024-07-04 NOTE — PLAN OF CARE
Pt awake, alert.  Vital signs stable, denies nausea, states pain is better, abd soft. No adverse effects of anesthesia noted. Transferred to room per wheelchair,  Report given to Christina

## 2024-07-04 NOTE — PLAN OF CARE
Problem: Adult Inpatient Plan of Care  Goal: Plan of Care Review  Outcome: Progressing  Goal: Patient-Specific Goal (Individualized)  Outcome: Progressing  Goal: Absence of Hospital-Acquired Illness or Injury  Outcome: Progressing  Goal: Optimal Comfort and Wellbeing  Outcome: Progressing  Goal: Readiness for Transition of Care  Outcome: Progressing     Problem: Gastrointestinal Bleeding  Goal: Optimal Coping with Acute Illness  Outcome: Progressing  Goal: Hemostasis  Outcome: Progressing

## 2024-07-04 NOTE — DISCHARGE SUMMARY
"[facility] Medicine  Discharge Summary      Patient Name: Jerod Barrientos  MRN: [id number]  SKYLAR: [id number]  Patient Class: OP- Observation  Admission Date: 6/28/2024  Hospital Length of Stay: 0 days  Discharge Date and Time: 07/1/2024  Attending Physician: No att. providers found   Discharging Provider: Anitha Warren MD  Primary Care Provider: Sai Mccracken III, MD    Primary Care Team: Networked reference to record PCT     HPI:   Jerod Barrientos is a 59 year old male with a past medical history of HTN and occasional tobacco use who presented to the ED with a complaint of rectal bleeding. He reports 3 days of dark bloody stool along with epigastric and left lower quadrant pain. He reports shortness of breath with exertion along with chest discomfort, but denies at rest. He takes Celebrex and the occasional Goody's powder. He denies other complaint.     ED work up included a CBC which shows anemia. CMP shows moderate malnutrition. Lipase 18, Troponin <0.006, BNP <10. CXR negative for acute findings. GI consulted, Dr. Pruitt, who recommended a CT GIB protocol, along with NPO status for scope tomorrow, and PPI BID. CT shows "IMPRESSION: 1. No CT evidence of active gastrointestinal hemorrhage. 2. Urinary bladder wall thickening measuring up to 5 mm may be due to infectious or inflammatory cystitis. 3. Colonic diverticulosis. 4. Mild constipation. 5. Cholelithiasis. 6. Small hiatal hernia."  He was given Protonix in the ED. Hospital medicine consulted for admission and further management.        Procedure(s) (LRB):  EGD (ESOPHAGOGASTRODUODENOSCOPY) (N/A)      Hospital Course:   Patient admitted with GIB. Initial CTA abdomen stated no acute bleed but was corrected by radiology regarding an active GIB present on imaging. GI consulted. Patient underwent EGD.   Red blood and clot in the gastric body. No                          recurrence for 10 minutes after clearance with            "               suction/lavage, suspect Dieulafoy lesion.     Clear liquids. PPI BID. Monitor CBC overnight. Patient has chronic back pain and counseled on Goody's and celebrex.   Transfuse 1 unit; hemoglobin 7.0.    8.0/24.3... h/h on dc home.   Follow up PCP and GI as scheduled      Goals of Care Treatment Preferences:  Code Status: Full Code      Consults:     No new Assessment & Plan notes have been filed under this hospital service since the last note was generated.  Service: Hospital Medicine    Final Active Diagnoses:    Diagnosis Date Noted POA    PRINCIPAL PROBLEM:  GI bleed [K92.2] 06/28/2024 Yes    Malnutrition of moderate degree [E44.0] 06/28/2024 Yes    Acute blood loss anemia [D62] 06/28/2024 Yes    Hypertension, essential [I10] 03/12/2023 Yes      Problems Resolved During this Admission:       Discharged Condition: good    Disposition: Home or Self Care    Follow Up:   Follow-up Information       Sai Mccracken III, MD. Go on 7/10/2024.    Specialty: Family Medicine  Why: at 10:40 AM for hospital follow up. will see NP for this visit  Contact information:  [address]  [address]  [place] [address]  [phone]               Kaitlyn Pruitt MD Follow up.    Specialties: Gastroenterology, Internal Medicine  Why: requested apt. office to contact you for scheduling. if you do not hear from office in next few days, please call to schedule  Contact information:  [address]  [address]  [place] [address]  [phone]                           Patient Instructions:      Diet Cardiac     Notify your health care provider if you experience any of the following:  difficulty breathing or increased cough     Notify your health care provider if you experience any of the following:  persistent dizziness, light-headedness, or visual disturbances     Activity as tolerated       Significant Diagnostic Studies: N/A    Pending Diagnostic Studies:       None           Medications:  Reconciled Home Medications:       Medication List        CHANGE how you take these medications      amLODIPine 10 MG tablet  Commonly known as: NORVASC  TAKE 1 TABLET BY MOUTH EVERY DAY  What changed: when to take this            CONTINUE taking these medications      lamoTRIgine 25 MG tablet  Commonly known as: LAMICTAL  TAKE TWO TABLETS BY MOUTH TWICE DAILY     methylPREDNISolone 4 mg tablet  Commonly known as: MEDROL DOSEPACK  Take 4 mg by mouth As instructed (Follow package instructions).     oxyCODONE-acetaminophen 7.5-325 mg per tablet  Commonly known as: PERCOCET  Take 1 tablet by mouth 3 (three) times daily.     pantoprazole 40 MG tablet  Commonly known as: PROTONIX  Take 1 tablet (40 mg total) by mouth 2 (two) times daily.     pregabalin 200 MG Cap  Commonly known as: LYRICA  Take 200 mg by mouth once daily.     tiZANidine 4 MG tablet  Commonly known as: ZANAFLEX  Take 4 mg by mouth 2 (two) times daily.            STOP taking these medications      celecoxib 200 MG capsule  Commonly known as: CeleBREX              Indwelling Lines/Drains at time of discharge:   Lines/Drains/Airways       None                   Time spent on the discharge of patient: 35 minutes         Anitha Warren MD  Department of Hospital Medicine  [facility]/Surg

## 2024-07-04 NOTE — NURSING
"Notified Dr. Warren that patient is asking why he cannot receive his oxycodone. MD stated that he cannot receive this now because patient is NPO. MD ordered Morphine. Informed MD that patient states he does not prefer to take morphine as he gets "jitters" when he receives Morphine. MD stated that patient had received this on a previous admission and that patient did fine. Relayed this to patient. Patient voiced understanding and stated that he is willing to try the Morphine again as ordered by MD.  "

## 2024-07-04 NOTE — PLAN OF CARE
[facility] - Med/Surg  Initial Discharge Assessment       Primary Care Provider: Sai Mccracken III, MD    Admission Diagnosis: Gastrointestinal hemorrhage, unspecified gastrointestinal hemorrhage type [K92.2]    Admission Date: 7/3/2024  Expected Discharge Date:     Transition of Care Barriers: None    Payor: BLUE CROSS BLUE SHIELD / Plan: [place] / Product Type: HMO /     Extended Emergency Contact Information  Primary Emergency Contact: Hortensia Barrientos  Address: [address] [place] [address] [address]           [place] [address] [facility]  Home Phone: [phone]  Mobile Phone: [phone]  Relation: Spouse  Preferred language: English   needed? No    Discharge Plan A: Home  Discharge Plan B: Home with family      [facility] - [place] - [address]  [address] [place] [address]  Phone: [phone] Fax: [phone]    SW met with patient at bedside to complete discharge planning assessment.  Patient alert and oriented xs 4.  Patient verified all demographic information on facesheet is correct.  Patient verified PCP is Dr. Mccracken.  Patient verified primary health insurance is BCBS.  Patient with NO home health or DME.  Patient with NO POA or Living Will.  Patient not on dialysis or medication coumadin.  Patient with 30 day admission.  Patient with no financial issues at this time.  Patient family will provide transportation upon discharge from facility.  Patient independent with ADLs, live with spouse, drives self.      Initial Assessment (most recent)       Adult Discharge Assessment - 07/04/24 1550          Discharge Assessment    Assessment Type Discharge Planning Assessment     Confirmed/corrected address, phone number and insurance Yes     Confirmed Demographics Correct on Facesheet     Source of Information patient     Communicated AISHWARYA with patient/caregiver Date not available/Unable to determine     People in Home spouse     Facility Arrived From: spouse      Do you expect to return to your current living situation? Yes     Do you have help at home or someone to help you manage your care at home? Yes     Who are your caregiver(s) and their phone number(s)? spouse     Prior to hospitilization cognitive status: Alert/Oriented     Current cognitive status: Alert/Oriented     Walking or Climbing Stairs Difficulty no     Dressing/Bathing Difficulty no     Equipment Currently Used at Home CPAP     Readmission within 30 days? Yes     Patient currently being followed by outpatient case management? No     Do you currently have service(s) that help you manage your care at home? No     Do you take prescription medications? Yes     Do you have prescription coverage? Yes     Do you have any problems affording any of your prescribed medications? No     Is the patient taking medications as prescribed? yes     Who is going to help you get home at discharge? spouse     How do you get to doctors appointments? car, drives self     Are you on dialysis? No     Do you take coumadin? No     Discharge Plan A Home     Discharge Plan B Home with family     DME Needed Upon Discharge  none     Discharge Plan discussed with: Patient     Transition of Care Barriers None

## 2024-07-04 NOTE — NURSING
Patient back from EGD. Informed by endo nurse that patient received 1 unit of PRBC's per anesthesia.

## 2024-07-04 NOTE — NURSING
Nurses Note -- 4 Eyes      7/3/2024   10:24 PM      Skin assessed during: Admit      [] No Altered Skin Integrity Present    []Prevention Measures Documented      [] Yes- Altered Skin Integrity Present or Discovered   [] LDA Added if Not in Epic (Describe Wound)   [] New Altered Skin Integrity was Present on Admit and Documented in LDA   [] Wound Image Taken    Wound Care Consulted? No    Attending Nurse:  angie Carbajal RN/Staff Member:   Leticia

## 2024-07-04 NOTE — PLAN OF CARE
Problem: Adult Inpatient Plan of Care  Goal: Plan of Care Review  Outcome: Progressing  Goal: Patient-Specific Goal (Individualized)  Outcome: Progressing  Goal: Absence of Hospital-Acquired Illness or Injury  Outcome: Progressing  Goal: Optimal Comfort and Wellbeing  Outcome: Progressing  Goal: Readiness for Transition of Care  Outcome: Progressing     Problem: Gastrointestinal Bleeding  Goal: Optimal Coping with Acute Illness  Outcome: Progressing  Goal: Hemostasis  Outcome: Progressing     Problem: Anemia  Goal: Anemia Symptom Improvement  Outcome: Progressing     Problem: Pain Acute  Goal: Optimal Pain Control and Function  Outcome: Progressing

## 2024-07-04 NOTE — NURSING
Notified Dr. Warren of patient and situation. ISBAR provided, explained to MD that patient is requesting his oxycodone to be resumed, being that he normally takes this at home for  chronic back pain. Informed MD that patient was given a one time dose of dilaudid overnight. Informed MD that patient does not currently have anything ordered for pain at this time. MD voiced understanding, and stated that she will place orders.

## 2024-07-04 NOTE — NURSING
Notified Dr. Warren that patient is back from his EGD, and is on a clear liquid diet now per . Asked MD if patient can have his oxycodone resumed for pain

## 2024-07-04 NOTE — TRANSFER OF CARE
Anesthesia Transfer of Care Note    Patient: Jerod Barrientos    Procedure(s) Performed: Procedure(s) (LRB):  EGD (ESOPHAGOGASTRODUODENOSCOPY) (N/A)    Patient location: GI    Anesthesia Type: general    Transport from OR: Transported from OR on 2-3 L/min O2 by NC with adequate spontaneous ventilation    Post pain: adequate analgesia    Post assessment: no apparent anesthetic complications and tolerated procedure well    Post vital signs: stable    Level of consciousness: sedated and responds to stimulation    Nausea/Vomiting: no nausea/vomiting    Complications: none    Transfer of care protocol was followed    Last vitals: Visit Vitals  /61   Pulse 65   Temp 36.8 °C (98.2 °F) (Skin)   Resp 16   Ht 6' (1.829 m)   Wt 88.9 kg (196 lb)   SpO2 100%   BMI 26.58 kg/m²

## 2024-07-04 NOTE — PROVATION PATIENT INSTRUCTIONS
Discharge Summary/Instructions after an Endoscopic Procedure  Patient Name: Jerod Penaloza  Patient MRN: [id number]  Patient YOB: 1965  Thursday, July 4, 2024  Niles Butler MD  Dear patient,  As a result of recent federal legislation (The Federal Cures Act), you may   receive lab or pathology results from your procedure in your [facility]   account before your physician is able to contact you. Your physician or   their representative will relay the results to you with their   recommendations at their soonest availability.  Thank you,  RESTRICTIONS:  During your procedure today, you received medications for sedation.  These   medications may affect your judgment, balance and coordination.  Therefore,   for 24 hours, you have the following restrictions:   - DO NOT drive a car, operate machinery, make legal/financial decisions,   sign important papers or drink alcohol.    ACTIVITY:  Today: no heavy lifting, straining or running due to procedural   sedation/anesthesia.  The following day: return to full activity including work.  DIET:  Eat and drink normally unless instructed otherwise.     TREATMENT FOR COMMON SIDE EFFECTS:  - Mild abdominal pain, nausea, belching, bloating or excessive gas:  rest,   eat lightly and use a heating pad.  - Sore Throat: treat with throat lozenges and/or gargle with warm salt   water.  - Because air was used during the procedure, expelling large amounts of air   from your rectum or belching is normal.  - If a bowel prep was taken, you may not have a bowel movement for 1-3 days.    This is normal.  SYMPTOMS TO WATCH FOR AND REPORT TO YOUR PHYSICIAN:  1. Abdominal pain or bloating, other than gas cramps.  2. Chest pain.  3. Back pain.  4. Signs of infection such as: chills or fever occurring within 24 hours   after the procedure.  5. Rectal bleeding, which would show as bright red, maroon, or black stools.   (A tablespoon of blood from the rectum is not serious, especially  if   hemorrhoids are present.)  6. Vomiting.  7. Weakness or dizziness.  GO DIRECTLY TO THE NEAREST EMERGENCY ROOM IF YOU HAVE ANY OF THE FOLLOWING:      Difficulty breathing              Chills and/or fever over 101 F   Persistent vomiting and/or vomiting blood   Severe abdominal pain   Severe chest pain   Black, tarry stools   Bleeding- more than one tablespoon   Any other symptom or condition that you feel may need urgent attention  Your doctor recommends these additional instructions:  If any biopsies were taken, your doctors clinic will contact you in 1 to 2   weeks with any results.  - Return patient to hospital [facility] for ongoing care.   - Clear liquid diet.   - Continue present medications.   - Give Protonix (pantoprazole): 8 mg/hr IV by continuous infusion.   - Use sucralfate tablets 2 gram PO QID for a few days.   - If rebleeds. perform CT angiogram scan of the mesenterics.  For questions, problems or results please call your physician - Niles Butler MD at Work:  [phone].  [facility], EMERGENCY ROOM PHONE NUMBER: [phone]  IF A COMPLICATION OR EMERGENCY SITUATION ARISES AND YOU ARE UNABLE TO REACH   YOUR PHYSICIAN - GO DIRECTLY TO THE EMERGENCY ROOM.  Niles Butler MD  7/4/2024 12:18:50 PM  This report has been verified and signed electronically.  Dear patient,  As a result of recent federal legislation (The Federal Cures Act), you may   receive lab or pathology results from your procedure in your [facility]   account before your physician is able to contact you. Your physician or   their representative will relay the results to you with their   recommendations at their soonest availability.  Thank you,  PROVATION

## 2024-07-04 NOTE — H&P
History & Physical - Short Stay  Gastroenterology      SUBJECTIVE:     Procedure: Gastroscopy    Chief Complaint/Indication for Procedure:  Melena.  Anemia.    History of Present Illness:      PTA Medications   Medication Sig    amLODIPine (NORVASC) 10 MG tablet TAKE 1 TABLET BY MOUTH EVERY DAY (Patient taking differently: Take 10 mg by mouth once daily.)    celecoxib (CELEBREX) 200 MG capsule Take 200 mg by mouth 2 (two) times daily.    methylPREDNISolone (MEDROL DOSEPACK) 4 mg tablet Take 4 mg by mouth As instructed (Follow package instructions).    oxyCODONE-acetaminophen (PERCOCET) 7.5-325 mg per tablet Take 1 tablet by mouth 3 (three) times daily.    pantoprazole (PROTONIX) 40 MG tablet Take 1 tablet (40 mg total) by mouth 2 (two) times daily.    tiZANidine (ZANAFLEX) 4 MG tablet Take 4 mg by mouth 2 (two) times daily.    lamoTRIgine (LAMICTAL) 25 MG tablet TAKE TWO TABLETS BY MOUTH TWICE DAILY (Patient not taking: Reported on 7/3/2024)    pregabalin (LYRICA) 200 MG Cap Take 200 mg by mouth once daily.       Review of patient's allergies indicates:  No Known Allergies     Past Medical History:   Diagnosis Date    Hypertension     Sleep apnea      Past Surgical History:   Procedure Laterality Date    COLONOSCOPY N/A 04/24/2023    Procedure: COLONOSCOPY;  Surgeon: Yunior Major MD;  Location: [facility];  Service: Endoscopy;  Laterality: N/A;  lm/ppm    ESOPHAGOGASTRODUODENOSCOPY  2012    ESOPHAGOGASTRODUODENOSCOPY N/A 7/6/2023    Procedure: EGD (ESOPHAGOGASTRODUODENOSCOPY);  Surgeon: Yunior Major MD;  Location: [facility];  Service: Endoscopy;  Laterality: N/A;    ESOPHAGOGASTRODUODENOSCOPY N/A 6/29/2024    Procedure: EGD (ESOPHAGOGASTRODUODENOSCOPY);  Surgeon: Kaitlyn Pruitt MD;  Location: [facility];  Service: Endoscopy;  Laterality: N/A;    INJECTION OF ANESTHETIC AGENT AROUND MEDIAL BRANCH NERVES INNERVATING LUMBAR FACET JOINT Left 05/16/2023    Procedure: Block-nerve-medial branch-lumbar;   Surgeon: Syed Betancourt MD;  Location: [facility] OR;  Service: Pain Management;  Laterality: Left;  L3,4,5    NECK SURGERY       Family History   Problem Relation Name Age of Onset    Cancer Mother          lung cancer    Alcohol abuse Father      Cancer Maternal Grandfather          Throat cancer     Social History     Tobacco Use    Smoking status: Former     Types: Cigarettes    Smokeless tobacco: Never   Substance Use Topics    Alcohol use: Never    Drug use: Never         OBJECTIVE:     Vital Signs (Most Recent)  Temp: 98.2 °F (36.8 °C) (07/04/24 1115)  Pulse: 65 (07/04/24 1115)  Resp: 16 (07/04/24 1115)  BP: 132/61 (07/04/24 1115)  SpO2: 100 % (07/04/24 1115)    Physical Exam:  :                                                        GENERAL:  Comfortable, in no acute distress.                                 HEENT EXAM:  Nonicteric.  No adenopathy.  Oropharynx is clear.               NECK:  Supple.                                                               LUNGS:  Clear.                                                               CARDIAC:  Regular rate and rhythm.  S1, S2.  No murmur.                      ABDOMEN:  Soft, positive bowel sounds, nontender.  No hepatosplenomegaly or masses.  No rebound or guarding.                                             EXTREMITIES:  No edema.     MENTAL STATUS:  Alert and oriented.    ASSESSMENT/PLAN:     Assessment:     Plan: Gastroscopy    Anesthesia Plan:   MAC / General Anaesthesia    ASA Grade: {ASA GRADE:979613}    MALLAMPATI SCORE: {mallampati score:09607}

## 2024-07-04 NOTE — NURSING
Asked Dr. Warren if she would like to recheck patient's hemoglobin and hematocrit this afternoon. Informed MD that patient received his unit of blood in endoscopy. MD stated that patient will have a CBC in the morning.

## 2024-07-05 ENCOUNTER — ANESTHESIA (OUTPATIENT)
Dept: ENDOSCOPY | Facility: HOSPITAL | Age: 59
End: 2024-07-05
Payer: COMMERCIAL

## 2024-07-05 ENCOUNTER — ANESTHESIA EVENT (OUTPATIENT)
Dept: ENDOSCOPY | Facility: HOSPITAL | Age: 59
End: 2024-07-05
Payer: COMMERCIAL

## 2024-07-05 LAB
BASOPHILS # BLD AUTO: 0.01 K/UL (ref 0–0.2)
BASOPHILS # BLD AUTO: 0.03 K/UL (ref 0–0.2)
BASOPHILS NFR BLD: 0.2 % (ref 0–1.9)
BASOPHILS NFR BLD: 0.5 % (ref 0–1.9)
DIFFERENTIAL METHOD BLD: ABNORMAL
DIFFERENTIAL METHOD BLD: ABNORMAL
EOSINOPHIL # BLD AUTO: 0.1 K/UL (ref 0–0.5)
EOSINOPHIL # BLD AUTO: 0.1 K/UL (ref 0–0.5)
EOSINOPHIL NFR BLD: 1.6 % (ref 0–8)
EOSINOPHIL NFR BLD: 2.2 % (ref 0–8)
ERYTHROCYTE [DISTWIDTH] IN BLOOD BY AUTOMATED COUNT: 15.2 % (ref 11.5–14.5)
ERYTHROCYTE [DISTWIDTH] IN BLOOD BY AUTOMATED COUNT: 15.5 % (ref 11.5–14.5)
HCT VFR BLD AUTO: 22.5 % (ref 40–54)
HCT VFR BLD AUTO: 22.8 % (ref 40–54)
HCT VFR BLD AUTO: 23.4 % (ref 40–54)
HGB BLD-MCNC: 7.3 G/DL (ref 14–18)
HGB BLD-MCNC: 7.6 G/DL (ref 14–18)
HGB BLD-MCNC: 7.7 G/DL (ref 14–18)
IMM GRANULOCYTES # BLD AUTO: 0.08 K/UL (ref 0–0.04)
IMM GRANULOCYTES # BLD AUTO: 0.1 K/UL (ref 0–0.04)
IMM GRANULOCYTES NFR BLD AUTO: 1.5 % (ref 0–0.5)
IMM GRANULOCYTES NFR BLD AUTO: 1.6 % (ref 0–0.5)
LYMPHOCYTES # BLD AUTO: 1 K/UL (ref 1–4.8)
LYMPHOCYTES # BLD AUTO: 1.3 K/UL (ref 1–4.8)
LYMPHOCYTES NFR BLD: 17.4 % (ref 18–48)
LYMPHOCYTES NFR BLD: 20.8 % (ref 18–48)
MCH RBC QN AUTO: 31.2 PG (ref 27–31)
MCH RBC QN AUTO: 31.6 PG (ref 27–31)
MCHC RBC AUTO-ENTMCNC: 32.4 G/DL (ref 32–36)
MCHC RBC AUTO-ENTMCNC: 32.9 G/DL (ref 32–36)
MCV RBC AUTO: 96 FL (ref 82–98)
MCV RBC AUTO: 96 FL (ref 82–98)
MONOCYTES # BLD AUTO: 0.4 K/UL (ref 0.3–1)
MONOCYTES # BLD AUTO: 0.5 K/UL (ref 0.3–1)
MONOCYTES NFR BLD: 7.4 % (ref 4–15)
MONOCYTES NFR BLD: 7.9 % (ref 4–15)
NEUTROPHILS # BLD AUTO: 3.9 K/UL (ref 1.8–7.7)
NEUTROPHILS # BLD AUTO: 4.1 K/UL (ref 1.8–7.7)
NEUTROPHILS NFR BLD: 68.1 % (ref 38–73)
NEUTROPHILS NFR BLD: 70.8 % (ref 38–73)
NRBC BLD-RTO: 0 /100 WBC
NRBC BLD-RTO: 0 /100 WBC
PLATELET # BLD AUTO: 203 K/UL (ref 150–450)
PLATELET # BLD AUTO: 207 K/UL (ref 150–450)
PMV BLD AUTO: 9.2 FL (ref 9.2–12.9)
PMV BLD AUTO: 9.9 FL (ref 9.2–12.9)
RBC # BLD AUTO: 2.34 M/UL (ref 4.6–6.2)
RBC # BLD AUTO: 2.44 M/UL (ref 4.6–6.2)
WBC # BLD AUTO: 5.45 K/UL (ref 3.9–12.7)
WBC # BLD AUTO: 6.07 K/UL (ref 3.9–12.7)

## 2024-07-05 PROCEDURE — 25000003 PHARM REV CODE 250: Performed by: NURSE ANESTHETIST, CERTIFIED REGISTERED

## 2024-07-05 PROCEDURE — 36415 COLL VENOUS BLD VENIPUNCTURE: CPT | Performed by: HOSPITALIST

## 2024-07-05 PROCEDURE — 25000003 PHARM REV CODE 250: Performed by: INTERNAL MEDICINE

## 2024-07-05 PROCEDURE — 85014 HEMATOCRIT: CPT | Performed by: HOSPITALIST

## 2024-07-05 PROCEDURE — 36415 COLL VENOUS BLD VENIPUNCTURE: CPT | Performed by: INTERNAL MEDICINE

## 2024-07-05 PROCEDURE — 37000008 HC ANESTHESIA 1ST 15 MINUTES: Performed by: INTERNAL MEDICINE

## 2024-07-05 PROCEDURE — 94761 N-INVAS EAR/PLS OXIMETRY MLT: CPT

## 2024-07-05 PROCEDURE — 85018 HEMOGLOBIN: CPT | Performed by: HOSPITALIST

## 2024-07-05 PROCEDURE — 11000001 HC ACUTE MED/SURG PRIVATE ROOM

## 2024-07-05 PROCEDURE — 37000009 HC ANESTHESIA EA ADD 15 MINS: Performed by: INTERNAL MEDICINE

## 2024-07-05 PROCEDURE — 85025 COMPLETE CBC W/AUTO DIFF WBC: CPT | Mod: 91 | Performed by: INTERNAL MEDICINE

## 2024-07-05 PROCEDURE — 44360 SMALL BOWEL ENDOSCOPY: CPT | Mod: ,,, | Performed by: INTERNAL MEDICINE

## 2024-07-05 PROCEDURE — 25000003 PHARM REV CODE 250: Performed by: HOSPITALIST

## 2024-07-05 PROCEDURE — 0DJ08ZZ INSPECTION OF UPPER INTESTINAL TRACT, VIA NATURAL OR ARTIFICIAL OPENING ENDOSCOPIC: ICD-10-PCS | Performed by: INTERNAL MEDICINE

## 2024-07-05 PROCEDURE — 63600175 PHARM REV CODE 636 W HCPCS: Performed by: INTERNAL MEDICINE

## 2024-07-05 PROCEDURE — 63600175 PHARM REV CODE 636 W HCPCS: Performed by: NURSE ANESTHETIST, CERTIFIED REGISTERED

## 2024-07-05 PROCEDURE — 36415 COLL VENOUS BLD VENIPUNCTURE: CPT | Performed by: NURSE PRACTITIONER

## 2024-07-05 PROCEDURE — 85025 COMPLETE CBC W/AUTO DIFF WBC: CPT | Performed by: NURSE PRACTITIONER

## 2024-07-05 PROCEDURE — 44360 SMALL BOWEL ENDOSCOPY: CPT | Performed by: INTERNAL MEDICINE

## 2024-07-05 RX ORDER — SODIUM CHLORIDE 9 MG/ML
INJECTION, SOLUTION INTRAVENOUS CONTINUOUS
Status: DISCONTINUED | OUTPATIENT
Start: 2024-07-05 | End: 2024-07-06 | Stop reason: HOSPADM

## 2024-07-05 RX ORDER — LIDOCAINE HYDROCHLORIDE 20 MG/ML
INJECTION INTRAVENOUS
Status: DISCONTINUED | OUTPATIENT
Start: 2024-07-05 | End: 2024-07-05

## 2024-07-05 RX ORDER — PROPOFOL 10 MG/ML
VIAL (ML) INTRAVENOUS
Status: DISCONTINUED | OUTPATIENT
Start: 2024-07-05 | End: 2024-07-05

## 2024-07-05 RX ADMIN — OXYCODONE AND ACETAMINOPHEN 1 TABLET: 7.5; 325 TABLET ORAL at 08:07

## 2024-07-05 RX ADMIN — SODIUM CHLORIDE: 9 INJECTION, SOLUTION INTRAVENOUS at 12:07

## 2024-07-05 RX ADMIN — OXYCODONE AND ACETAMINOPHEN 1 TABLET: 7.5; 325 TABLET ORAL at 07:07

## 2024-07-05 RX ADMIN — PROPOFOL 50 MG: 10 INJECTION, EMULSION INTRAVENOUS at 01:07

## 2024-07-05 RX ADMIN — OXYCODONE AND ACETAMINOPHEN 1 TABLET: 7.5; 325 TABLET ORAL at 02:07

## 2024-07-05 RX ADMIN — SODIUM CHLORIDE: 0.9 INJECTION, SOLUTION INTRAVENOUS at 01:07

## 2024-07-05 RX ADMIN — SUCRALFATE 2 G: 1 TABLET ORAL at 09:07

## 2024-07-05 RX ADMIN — PANTOPRAZOLE SODIUM 40 MG: 40 INJECTION, POWDER, LYOPHILIZED, FOR SOLUTION INTRAVENOUS at 09:07

## 2024-07-05 RX ADMIN — PANTOPRAZOLE SODIUM 40 MG: 40 INJECTION, POWDER, LYOPHILIZED, FOR SOLUTION INTRAVENOUS at 08:07

## 2024-07-05 RX ADMIN — SUCRALFATE 2 G: 1 TABLET ORAL at 04:07

## 2024-07-05 RX ADMIN — SUCRALFATE 2 G: 1 TABLET ORAL at 05:07

## 2024-07-05 RX ADMIN — LIDOCAINE HYDROCHLORIDE 50 MG: 20 INJECTION, SOLUTION INTRAVENOUS at 01:07

## 2024-07-05 NOTE — HOSPITAL COURSE
Patient admitted with recurrent GIB. Tranfused 2 units on admit. Did not respond appropriately. GI consulted. Underwent EGD.   - Normal gastric fundus, gastric body and antrum.                          - Gastritis. Biopsied.                          - Normal pylorus.                          - Erythematous duodenopathy.                          - Non-bleeding duodenal ulcer with no stigmata of                          bleeding.                          - Normal examined duodenum.     Recommended continue PPI, added carafate and repeat CT of abdomen with contrast.   Transfuse 1 more unit PRBC 7/4/24  CTA abdomen resulted:Active GI bleed within the descending limb of the duodenum.  No significant change.   Small cholelithiasis.   Diverticulosis.   GI notified and he was taken for a SB enteroscopy:- Normal esophagus.                          - Normal stomach.                          - Duodenitis characterized by erythema.                          - The examined portion of the jejunum was normal.                          - No specimens collected.                          -No active bleeding or old blood     Diet was advanced after procedure with GI recommendation to dc home when H/H was stable.   Hemoglobin was stable.  Patient tolerated ordered diet.  He was discharged home on PPI and Carafate.  Instructed to follow up with PCP and GI.  Plan for video capsule endoscopy with GI as outpatient.  Discussed symptoms of anemia with him and instructed him to seek medical attention if he notices any shortness a breath, weakness, fatigue or other symptoms that could indicate needing a blood transfusion.

## 2024-07-05 NOTE — TRANSFER OF CARE
Anesthesia Transfer of Care Note    Patient: Jerod Barrientos    Procedure(s) Performed: Procedure(s) (LRB):  ENTEROSCOPY (N/A)    Patient location: GI    Anesthesia Type: general    Transport from OR: Transported from OR on room air with adequate spontaneous ventilation    Post pain: adequate analgesia    Post assessment: no apparent anesthetic complications    Post vital signs: stable    Level of consciousness: awake and alert    Nausea/Vomiting: no nausea/vomiting    Complications: none    Transfer of care protocol was followed      Last vitals: Visit Vitals  /78 (BP Location: Right arm, Patient Position: Lying)   Pulse 60   Temp 36.7 °C (98.1 °F) (Skin)   Resp 18   Ht 6' (1.829 m)   Wt 88.9 kg (196 lb)   SpO2 100%   BMI 26.58 kg/m²

## 2024-07-05 NOTE — PLAN OF CARE
EGD with push enteroscopy:  -Scope advanced to proximal jejunum, no old blood or active bleeding      Recommendation:  -Ok for diet  -Repeat CBC this afternoon- if stable can discharge home  -Follow up in GI clinic, likely schedule outpatient VCE     Kaitlyn Pruitt MD

## 2024-07-05 NOTE — CARE UPDATE
07/04/24 1941   Patient Assessment/Suction   Level of Consciousness (AVPU) alert   Respiratory Effort Unlabored   PRE-TX-O2   Device (Oxygen Therapy) room air   SpO2 99 %   Pulse Oximetry Type Intermittent   $ Pulse Oximetry - Multiple Charge Pulse Oximetry - Multiple   Pulse (!) 55   Resp 18

## 2024-07-05 NOTE — ANESTHESIA PREPROCEDURE EVALUATION
07/05/2024  Jerod Barrientos is a 59 y.o., male.      Pre-op Assessment    I have reviewed the Patient Summary Reports.     I have reviewed the Nursing Notes. I have reviewed the NPO Status.   I have reviewed the Medications.     Review of Systems  Anesthesia Hx:  No problems with previous Anesthesia                Social:  Former Smoker       Cardiovascular:     Hypertension                                  Hypertension         Pulmonary:        Sleep Apnea     Obstructive Sleep Apnea (BERNARD).           Neurological:    Neuromuscular Disease,                                 Neuromuscular Disease   Psych:  Psychiatric History                  Physical Exam  General: Well nourished, Cooperative, Alert and Oriented    Airway:  Mallampati: II   Mouth Opening: Normal  TM Distance: Normal  Tongue: Normal  Neck ROM: Normal ROM    Dental:  Intact    Chest/Lungs:  Normal Respiratory Rate    Heart:  Rate: Normal        Anesthesia Plan  Type of Anesthesia, risks & benefits discussed:    Anesthesia Type: Gen Natural Airway  Intra-op Monitoring Plan: Standard ASA Monitors  Induction:  IV  Informed Consent: Informed consent signed with the Patient and all parties understand the risks and agree with anesthesia plan.  All questions answered.   ASA Score: 3    Ready For Surgery From Anesthesia Perspective.     .

## 2024-07-05 NOTE — ASSESSMENT & PLAN NOTE
Patient has acute blood loss due to hemorrhage, the hemorrhage is due to gastrointestinal bleed, patient does not have a propensity for bleeding.. Will trend hemoglobin/hematocrit Every 4 hours, as well as monitor and correct for any coagulation defects. CBC and vital signs have been reviewed and last CBC was noted-   Lab Results   Component Value Date    WBC 6.07 07/05/2024    HGB 7.7 (L) 07/05/2024    HCT 23.4 (L) 07/05/2024    MCV 96 07/05/2024     07/05/2024         Will order a type and screen and consent patient for blood transfusion.  Will transfuse patient with 2 units of packed red blood cells tonight. Did not respond appropriately  Transfuse 1 more unit today  CTA of abdomen after EGD

## 2024-07-05 NOTE — PROVATION PATIENT INSTRUCTIONS
Discharge Summary/Instructions after an Endoscopic Procedure  Patient Name: Jerod Penaloza  Patient MRN: [id number]  Patient YOB: 1965  Friday, July 5, 2024  Kaitlyn Pruitt MD  Dear patient,  As a result of recent federal legislation (The Federal Cures Act), you may   receive lab or pathology results from your procedure in your [facility]   account before your physician is able to contact you. Your physician or   their representative will relay the results to you with their   recommendations at their soonest availability.  Thank you,  RESTRICTIONS:  During your procedure today, you received medications for sedation.  These   medications may affect your judgment, balance and coordination.  Therefore,   for 24 hours, you have the following restrictions:   - DO NOT drive a car, operate machinery, make legal/financial decisions,   sign important papers or drink alcohol.    ACTIVITY:  Today: no heavy lifting, straining or running due to procedural   sedation/anesthesia.  The following day: return to full activity including work.  DIET:  Eat and drink normally unless instructed otherwise.     TREATMENT FOR COMMON SIDE EFFECTS:  - Mild abdominal pain, nausea, belching, bloating or excessive gas:  rest,   eat lightly and use a heating pad.  - Sore Throat: treat with throat lozenges and/or gargle with warm salt   water.  - Because air was used during the procedure, expelling large amounts of air   from your rectum or belching is normal.  - If a bowel prep was taken, you may not have a bowel movement for 1-3 days.    This is normal.  SYMPTOMS TO WATCH FOR AND REPORT TO YOUR PHYSICIAN:  1. Abdominal pain or bloating, other than gas cramps.  2. Chest pain.  3. Back pain.  4. Signs of infection such as: chills or fever occurring within 24 hours   after the procedure.  5. Rectal bleeding, which would show as bright red, maroon, or black stools.   (A tablespoon of blood from the rectum is not serious,  especially if   hemorrhoids are present.)  6. Vomiting.  7. Weakness or dizziness.  GO DIRECTLY TO THE NEAREST EMERGENCY ROOM IF YOU HAVE ANY OF THE FOLLOWING:      Difficulty breathing              Chills and/or fever over 101 F   Persistent vomiting and/or vomiting blood   Severe abdominal pain   Severe chest pain   Black, tarry stools   Bleeding- more than one tablespoon   Any other symptom or condition that you feel may need urgent attention  Your doctor recommends these additional instructions:  If any biopsies were taken, your doctors clinic will contact you in 1 to 2   weeks with any results.  - Return patient to hospital [facility] for ongoing care.   -Diet as tolerated  -Repeat hemoglobin, if stable may discharge home today on PPI daily   -Will arrange outpatient VCE  For questions, problems or results please call your physician - Kaitlyn Pruitt MD at Work:  [phone].  [facility], EMERGENCY ROOM PHONE NUMBER: [phone]  IF A COMPLICATION OR EMERGENCY SITUATION ARISES AND YOU ARE UNABLE TO REACH   YOUR PHYSICIAN - GO DIRECTLY TO THE EMERGENCY ROOM.  Kaitlyn Pruitt MD  7/5/2024 2:01:38 PM  This report has been verified and signed electronically.  Dear patient,  As a result of recent federal legislation (The Federal Cures Act), you may   receive lab or pathology results from your procedure in your [facility]   account before your physician is able to contact you. Your physician or   their representative will relay the results to you with their   recommendations at their soonest availability.  Thank you,  PROVATION

## 2024-07-05 NOTE — ASSESSMENT & PLAN NOTE
Patient's anemia is currently uncontrolled. Has received 2 units of PRBCs on 7/3/2024 . Etiology likely d/t acute blood loss which was from GI bleed  Current CBC reviewed-   Lab Results   Component Value Date    HGB 7.7 (L) 07/05/2024    HCT 23.4 (L) 07/05/2024     Monitor serial CBC and transfuse if patient becomes hemodynamically unstable, symptomatic or H/H drops below 7/21.   See GIB

## 2024-07-05 NOTE — NURSING
Treatment team aware of CT ABD results. Dr Pruitt notified and spoke with pt personally regarding Staying NPO for EGD today around 1p. Pt aware, verbalized understanding. NPO reordered

## 2024-07-05 NOTE — PROGRESS NOTES
[facility]  Progress Note    Patient Name: Jerod Barrientos  MRN: [id number]  Patient Class: IP- Inpatient   Admission Date: 7/3/2024  Length of Stay: 1 days  Attending Physician: Anitha Warren MD  Primary Care Provider: Sai Mccracken III, MD        Subjective:     Principal Problem:GI bleed        HPI:  Jerod Barrientos is a 59-year-old male who presents emergency room for evaluation will check stools, exertional shortness a breath, and exertional chest pain.  He was recently admitted for GI bleed which required a blood transfusion.  EGD at that time failed to demonstrate any active bleeding.  He does endorse smoking cigarettes and he uses Goody powders.  He denies any significant alcohol use.  Previous medical history includes diverticulitis, depression, smoker, and obstructive sleep apnea.  ER workup: CBC with H&H of 6/18.  CMP unremarkable.  Type and screen obtained.  EKG demonstrated sinus rhythm without evidence of STEMI or ST or T-wave abnormalities.  GI was consulted.  GI recommended NPO after midnight for EGD in a.m..  Patient be transfused 2 units of packed red blood cells.  Patient admitted to Hospital Medicine for treatment management            Overview/Hospital Course:  Patient admitted with recurrent GIB. Tranfused 2 units on admit. Did not respond appropriately. GI consulted. Underwent EGD.   - Normal gastric fundus, gastric body and antrum.                          - Gastritis. Biopsied.                          - Normal pylorus.                          - Erythematous duodenopathy.                          - Non-bleeding duodenal ulcer with no stigmata of                          bleeding.                          - Normal examined duodenum.     Recommended continue PPI, added carafate and repeat CT of abdomen with contrast.   Transfuse 1 more unit PRBC     Interval History: see hospital course; patient mostly c/o chronic back pain     Review of  Systems   Constitutional:  Negative for chills and fever.   Respiratory: Negative.     Cardiovascular: Negative.    Musculoskeletal:  Positive for arthralgias and back pain.   Neurological: Negative.    Psychiatric/Behavioral:  The patient is nervous/anxious.      Objective:     Vital Signs (Most Recent):  Temp: 98 °F (36.7 °C) (07/05/24 0344)  Pulse: (!) 49 (07/05/24 0344)  Resp: 18 (07/05/24 0344)  BP: (!) 96/51 (07/05/24 0344)  SpO2: 96 % (07/05/24 0344) Vital Signs (24h Range):  Temp:  [96.7 °F (35.9 °C)-98.9 °F (37.2 °C)] 98 °F (36.7 °C)  Pulse:  [49-69] 49  Resp:  [12-19] 18  SpO2:  [94 %-100 %] 96 %  BP: ()/(51-78) 96/51     Weight: 88.9 kg (196 lb)  Body mass index is 26.58 kg/m².    Intake/Output Summary (Last 24 hours) at 7/5/2024 0746  Last data filed at 7/4/2024 1600  Gross per 24 hour   Intake 1368 ml   Output --   Net 1368 ml         Physical Exam  Constitutional:       General: He is not in acute distress.  HENT:      Head: Normocephalic and atraumatic.   Cardiovascular:      Rate and Rhythm: Regular rhythm. Bradycardia present.   Pulmonary:      Effort: Pulmonary effort is normal. No respiratory distress.      Breath sounds: No wheezing.   Abdominal:      General: There is no distension.      Palpations: Abdomen is soft.      Tenderness: There is no guarding.   Musculoskeletal:         General: No swelling or deformity. Normal range of motion.      Cervical back: Normal range of motion and neck supple.   Skin:     General: Skin is warm and dry.   Neurological:      General: No focal deficit present.      Mental Status: He is alert and oriented to person, place, and time.             Significant Labs: All pertinent labs within the past 24 hours have been reviewed.  Recent Lab Results         07/05/24  0504        Baso # 0.03       Basophil % 0.5       Differential Method Automated       Eos # 0.1       Eos % 1.6       Gran # (ANC) 4.1       Gran % 68.1       Hematocrit 23.4       Hemoglobin  7.7       Immature Grans (Abs) 0.10  Comment: Mild elevation in immature granulocytes is non specific and   can be seen in a variety of conditions including stress response,   acute inflammation, trauma and pregnancy. Correlation with other   laboratory and clinical findings is essential.         Immature Granulocytes 1.6       Lymph # 1.3       Lymph % 20.8       MCH 31.6       MCHC 32.9       MCV 96       Mono # 0.5       Mono % 7.4       MPV 9.9       nRBC 0       Platelet Count 207       RBC 2.44       RDW 15.5       WBC 6.07               Significant Imaging: I have reviewed all pertinent imaging results/findings within the past 24 hours.    Assessment/Plan:      * GI bleed  Patient has acute blood loss due to hemorrhage, the hemorrhage is due to gastrointestinal bleed, patient does not have a propensity for bleeding.. Will trend hemoglobin/hematocrit Every 4 hours, as well as monitor and correct for any coagulation defects. CBC and vital signs have been reviewed and last CBC was noted-   Lab Results   Component Value Date    WBC 6.07 07/05/2024    HGB 7.7 (L) 07/05/2024    HCT 23.4 (L) 07/05/2024    MCV 96 07/05/2024     07/05/2024         Will order a type and screen and consent patient for blood transfusion.  Will transfuse patient with 2 units of packed red blood cells tonight. Did not respond appropriately  Transfuse 1 more unit today  CTA of abdomen after EGD      Symptomatic anemia  Patient's anemia is currently uncontrolled. Has received 2 units of PRBCs on 7/3/2024 . Etiology likely d/t acute blood loss which was from GI bleed  Current CBC reviewed-   Lab Results   Component Value Date    HGB 7.7 (L) 07/05/2024    HCT 23.4 (L) 07/05/2024     Monitor serial CBC and transfuse if patient becomes hemodynamically unstable, symptomatic or H/H drops below 7/21.   See GIB       VTE Risk Mitigation (From admission, onward)           Ordered     IP VTE HIGH RISK PATIENT  Once         07/03/24 1900      Place sequential compression device  Until discontinued         07/03/24 1900                    Discharge Planning   AISHWARYA:      Code Status: Full Code   Is the patient medically ready for discharge?:     Reason for patient still in hospital (select all that apply): Patient trending condition  Discharge Plan A: Home                  Anitha Warren MD  Department of Hospital Medicine   [facility]/Surg

## 2024-07-05 NOTE — PLAN OF CARE
Problem: Adult Inpatient Plan of Care  Goal: Plan of Care Review  Outcome: Progressing  Goal: Patient-Specific Goal (Individualized)  Outcome: Progressing  Goal: Absence of Hospital-Acquired Illness or Injury  Outcome: Progressing  Goal: Optimal Comfort and Wellbeing  Outcome: Progressing  Goal: Readiness for Transition of Care  Outcome: Progressing     Problem: Gastrointestinal Bleeding  Goal: Optimal Coping with Acute Illness  Outcome: Progressing  Goal: Hemostasis  Outcome: Progressing     Problem: Anemia  Goal: Anemia Symptom Improvement  Outcome: Progressing

## 2024-07-05 NOTE — SUBJECTIVE & OBJECTIVE
Interval History: see hospital course; patient mostly c/o chronic back pain     Review of Systems   Constitutional:  Negative for chills and fever.   Respiratory: Negative.     Cardiovascular: Negative.    Musculoskeletal:  Positive for arthralgias and back pain.   Neurological: Negative.    Psychiatric/Behavioral:  The patient is nervous/anxious.      Objective:     Vital Signs (Most Recent):  Temp: 98 °F (36.7 °C) (07/05/24 0344)  Pulse: (!) 49 (07/05/24 0344)  Resp: 18 (07/05/24 0344)  BP: (!) 96/51 (07/05/24 0344)  SpO2: 96 % (07/05/24 0344) Vital Signs (24h Range):  Temp:  [96.7 °F (35.9 °C)-98.9 °F (37.2 °C)] 98 °F (36.7 °C)  Pulse:  [49-69] 49  Resp:  [12-19] 18  SpO2:  [94 %-100 %] 96 %  BP: ()/(51-78) 96/51     Weight: 88.9 kg (196 lb)  Body mass index is 26.58 kg/m².    Intake/Output Summary (Last 24 hours) at 7/5/2024 0746  Last data filed at 7/4/2024 1600  Gross per 24 hour   Intake 1368 ml   Output --   Net 1368 ml         Physical Exam  Constitutional:       General: He is not in acute distress.  HENT:      Head: Normocephalic and atraumatic.   Cardiovascular:      Rate and Rhythm: Regular rhythm. Bradycardia present.   Pulmonary:      Effort: Pulmonary effort is normal. No respiratory distress.      Breath sounds: No wheezing.   Abdominal:      General: There is no distension.      Palpations: Abdomen is soft.      Tenderness: There is no guarding.   Musculoskeletal:         General: No swelling or deformity. Normal range of motion.      Cervical back: Normal range of motion and neck supple.   Skin:     General: Skin is warm and dry.   Neurological:      General: No focal deficit present.      Mental Status: He is alert and oriented to person, place, and time.             Significant Labs: All pertinent labs within the past 24 hours have been reviewed.  Recent Lab Results         07/05/24  0504        Baso # 0.03       Basophil % 0.5       Differential Method Automated       Eos # 0.1       Eos  % 1.6       Gran # (ANC) 4.1       Gran % 68.1       Hematocrit 23.4       Hemoglobin 7.7       Immature Grans (Abs) 0.10  Comment: Mild elevation in immature granulocytes is non specific and   can be seen in a variety of conditions including stress response,   acute inflammation, trauma and pregnancy. Correlation with other   laboratory and clinical findings is essential.         Immature Granulocytes 1.6       Lymph # 1.3       Lymph % 20.8       MCH 31.6       MCHC 32.9       MCV 96       Mono # 0.5       Mono % 7.4       MPV 9.9       nRBC 0       Platelet Count 207       RBC 2.44       RDW 15.5       WBC 6.07               Significant Imaging: I have reviewed all pertinent imaging results/findings within the past 24 hours.
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
## 2024-07-06 VITALS
HEIGHT: 72 IN | TEMPERATURE: 98 F | RESPIRATION RATE: 18 BRPM | BODY MASS INDEX: 26.55 KG/M2 | DIASTOLIC BLOOD PRESSURE: 61 MMHG | HEART RATE: 55 BPM | WEIGHT: 196 LBS | OXYGEN SATURATION: 99 % | SYSTOLIC BLOOD PRESSURE: 106 MMHG

## 2024-07-06 LAB
BASOPHILS # BLD AUTO: 0.02 K/UL (ref 0–0.2)
BASOPHILS NFR BLD: 0.4 % (ref 0–1.9)
DIFFERENTIAL METHOD BLD: ABNORMAL
EOSINOPHIL # BLD AUTO: 0.1 K/UL (ref 0–0.5)
EOSINOPHIL NFR BLD: 2.8 % (ref 0–8)
ERYTHROCYTE [DISTWIDTH] IN BLOOD BY AUTOMATED COUNT: 14.8 % (ref 11.5–14.5)
HCT VFR BLD AUTO: 22.2 % (ref 40–54)
HCT VFR BLD AUTO: 24.6 % (ref 40–54)
HGB BLD-MCNC: 7.4 G/DL (ref 14–18)
HGB BLD-MCNC: 7.8 G/DL (ref 14–18)
IMM GRANULOCYTES # BLD AUTO: 0.07 K/UL (ref 0–0.04)
IMM GRANULOCYTES NFR BLD AUTO: 1.4 % (ref 0–0.5)
LYMPHOCYTES # BLD AUTO: 1.2 K/UL (ref 1–4.8)
LYMPHOCYTES NFR BLD: 25.1 % (ref 18–48)
MCH RBC QN AUTO: 30.5 PG (ref 27–31)
MCHC RBC AUTO-ENTMCNC: 31.7 G/DL (ref 32–36)
MCV RBC AUTO: 96 FL (ref 82–98)
MONOCYTES # BLD AUTO: 0.4 K/UL (ref 0.3–1)
MONOCYTES NFR BLD: 8.3 % (ref 4–15)
NEUTROPHILS # BLD AUTO: 3.1 K/UL (ref 1.8–7.7)
NEUTROPHILS NFR BLD: 62 % (ref 38–73)
NRBC BLD-RTO: 0 /100 WBC
PLATELET # BLD AUTO: 251 K/UL (ref 150–450)
PMV BLD AUTO: 9.9 FL (ref 9.2–12.9)
RBC # BLD AUTO: 2.56 M/UL (ref 4.6–6.2)
WBC # BLD AUTO: 4.94 K/UL (ref 3.9–12.7)

## 2024-07-06 PROCEDURE — 36415 COLL VENOUS BLD VENIPUNCTURE: CPT | Performed by: HOSPITALIST

## 2024-07-06 PROCEDURE — 25000003 PHARM REV CODE 250: Performed by: INTERNAL MEDICINE

## 2024-07-06 PROCEDURE — 85025 COMPLETE CBC W/AUTO DIFF WBC: CPT | Performed by: INTERNAL MEDICINE

## 2024-07-06 PROCEDURE — 85014 HEMATOCRIT: CPT | Performed by: HOSPITALIST

## 2024-07-06 PROCEDURE — 94761 N-INVAS EAR/PLS OXIMETRY MLT: CPT

## 2024-07-06 PROCEDURE — 94760 N-INVAS EAR/PLS OXIMETRY 1: CPT

## 2024-07-06 PROCEDURE — 25000003 PHARM REV CODE 250: Performed by: HOSPITALIST

## 2024-07-06 PROCEDURE — 63600175 PHARM REV CODE 636 W HCPCS: Performed by: INTERNAL MEDICINE

## 2024-07-06 PROCEDURE — 85018 HEMOGLOBIN: CPT | Performed by: HOSPITALIST

## 2024-07-06 RX ORDER — SUCRALFATE 1 G/1
2 TABLET ORAL
Qty: 64 TABLET | Refills: 0 | Status: SHIPPED | OUTPATIENT
Start: 2024-07-06 | End: 2024-07-14

## 2024-07-06 RX ORDER — PANTOPRAZOLE SODIUM 40 MG/1
40 TABLET, DELAYED RELEASE ORAL DAILY
Qty: 90 TABLET | Refills: 0 | Status: SHIPPED | OUTPATIENT
Start: 2024-07-06 | End: 2025-07-06

## 2024-07-06 RX ADMIN — SUCRALFATE 2 G: 1 TABLET ORAL at 05:07

## 2024-07-06 RX ADMIN — PANTOPRAZOLE SODIUM 40 MG: 40 INJECTION, POWDER, LYOPHILIZED, FOR SOLUTION INTRAVENOUS at 08:07

## 2024-07-06 RX ADMIN — OXYCODONE AND ACETAMINOPHEN 1 TABLET: 7.5; 325 TABLET ORAL at 08:07

## 2024-07-06 NOTE — NURSING
Discharge instructions and education reviewed with patient. Telemetry box and electrodes removed.  Peripheral IV removed with catheter intact.  Pt to f/u with PCP and GI outpatient.  Pt escorted to family vehicle for discharge home.

## 2024-07-06 NOTE — ASSESSMENT & PLAN NOTE
Patient has acute blood loss due to hemorrhage, the hemorrhage is due to gastrointestinal bleed, patient does not have a propensity for bleeding.. Will trend hemoglobin/hematocrit Every 4 hours, as well as monitor and correct for any coagulation defects. CBC and vital signs have been reviewed and last CBC was noted-   Lab Results   Component Value Date    WBC 5.45 07/05/2024    HGB 7.3 (L) 07/05/2024    HCT 22.5 (L) 07/05/2024    MCV 96 07/05/2024     07/05/2024         Will order a type and screen and consent patient for blood transfusion.  Will transfuse patient with 2 units of packed red blood cells tonight. Did not respond appropriately  Transfuse 1 more unit today 7/4/24  CTA of abdomen after EGD - signficant for acute bleed duodenum  GI performed SB enteroscopy - no visible bleed found  Plan to check H/H q 8 hours - if stable can dc home - if continues to drop will notify GI.   PLan for video capsule study next.

## 2024-07-06 NOTE — PLAN OF CARE
Pt clear for DC from case management standpoint. Discharging to home     07/06/24 0862   Final Note   Anticipated Discharge Disposition Home

## 2024-07-06 NOTE — SUBJECTIVE & OBJECTIVE
Interval History: see hospital course; patient mostly c/o chronic back pain     Review of Systems   Constitutional:  Negative for chills and fever.   Respiratory: Negative.     Cardiovascular: Negative.    Musculoskeletal:  Positive for arthralgias and back pain.   Neurological: Negative.    Psychiatric/Behavioral:  The patient is nervous/anxious.      Objective:     Vital Signs (Most Recent):  Temp: 98 °F (36.7 °C) (07/05/24 1921)  Pulse: 72 (07/05/24 2004)  Resp: 18 (07/05/24 2004)  BP: 123/64 (07/05/24 1921)  SpO2: 96 % (07/05/24 2004) Vital Signs (24h Range):  Temp:  [98 °F (36.7 °C)-98.9 °F (37.2 °C)] 98 °F (36.7 °C)  Pulse:  [49-89] 72  Resp:  [16-20] 18  SpO2:  [94 %-100 %] 96 %  BP: ()/(49-81) 123/64     Weight: 88.9 kg (196 lb)  Body mass index is 26.58 kg/m².    Intake/Output Summary (Last 24 hours) at 7/5/2024 2112  Last data filed at 7/5/2024 1730  Gross per 24 hour   Intake 1300 ml   Output --   Net 1300 ml         Physical Exam  Constitutional:       General: He is not in acute distress.  HENT:      Head: Normocephalic and atraumatic.   Cardiovascular:      Rate and Rhythm: Regular rhythm. Bradycardia present.   Pulmonary:      Effort: Pulmonary effort is normal. No respiratory distress.      Breath sounds: No wheezing.   Abdominal:      General: There is no distension.      Palpations: Abdomen is soft.      Tenderness: There is no guarding.   Musculoskeletal:         General: No swelling or deformity. Normal range of motion.      Cervical back: Normal range of motion and neck supple.   Skin:     General: Skin is warm and dry.   Neurological:      General: No focal deficit present.      Mental Status: He is alert and oriented to person, place, and time.             Significant Labs: All pertinent labs within the past 24 hours have been reviewed.  Recent Lab Results         07/05/24  1416   07/05/24  0504        Baso # 0.01   0.03       Basophil % 0.2   0.5       Differential Method Automated    Automated       Eos # 0.1   0.1       Eos % 2.2   1.6       Gran # (ANC) 3.9   4.1       Gran % 70.8   68.1       Hematocrit 22.5   23.4       Hemoglobin 7.3   7.7       Immature Grans (Abs) 0.08  Comment: Mild elevation in immature granulocytes is non specific and   can be seen in a variety of conditions including stress response,   acute inflammation, trauma and pregnancy. Correlation with other   laboratory and clinical findings is essential.     0.10  Comment: Mild elevation in immature granulocytes is non specific and   can be seen in a variety of conditions including stress response,   acute inflammation, trauma and pregnancy. Correlation with other   laboratory and clinical findings is essential.         Immature Granulocytes 1.5   1.6       Lymph # 1.0   1.3       Lymph % 17.4   20.8       MCH 31.2   31.6       MCHC 32.4   32.9       MCV 96   96       Mono # 0.4   0.5       Mono % 7.9   7.4       MPV 9.2   9.9       nRBC 0   0       Platelet Count 203   207       RBC 2.34   2.44       RDW 15.2   15.5       WBC 5.45   6.07               Significant Imaging: I have reviewed all pertinent imaging results/findings within the past 24 hours.

## 2024-07-06 NOTE — PLAN OF CARE
Problem: Adult Inpatient Plan of Care  Goal: Plan of Care Review  Outcome: Met  Goal: Patient-Specific Goal (Individualized)  Outcome: Met  Goal: Absence of Hospital-Acquired Illness or Injury  Outcome: Met  Goal: Optimal Comfort and Wellbeing  Outcome: Met  Goal: Readiness for Transition of Care  Outcome: Met     Problem: Gastrointestinal Bleeding  Goal: Optimal Coping with Acute Illness  Outcome: Met  Goal: Hemostasis  Outcome: Met     Problem: Anemia  Goal: Anemia Symptom Improvement  Outcome: Met     Problem: Pain Acute  Goal: Optimal Pain Control and Function  Outcome: Met

## 2024-07-06 NOTE — ANESTHESIA POSTPROCEDURE EVALUATION
Anesthesia Post Evaluation    Patient: Jerod Barrientos    Procedure(s) Performed: Procedure(s) (LRB):  EGD (ESOPHAGOGASTRODUODENOSCOPY) (N/A)    Final Anesthesia Type: general      Patient location during evaluation: PACU  Patient participation: Yes- Able to Participate  Level of consciousness: awake and alert  Post-procedure vital signs: reviewed and stable  Pain management: adequate  Airway patency: patent    PONV status at discharge: No PONV  Anesthetic complications: no      Cardiovascular status: blood pressure returned to baseline  Respiratory status: unassisted and room air  Hydration status: euvolemic  Follow-up not needed.              Vitals Value Taken Time   /61 07/06/24 0738   Temp 36.9 °C (98.4 °F) 07/06/24 0738   Pulse 55 07/06/24 0738   Resp 18 07/06/24 0809   SpO2 99 % 07/06/24 0819         Event Time   Out of Recovery 07/04/2024 12:12:53         Pain/Sanket Score: Pain Rating Prior to Med Admin: 5 (7/6/2024  8:09 AM)  Pain Rating Post Med Admin: 2 (7/5/2024  8:50 PM)  Sanket Score: 10 (7/5/2024  2:10 PM)

## 2024-07-06 NOTE — PLAN OF CARE
Problem: Adult Inpatient Plan of Care  Goal: Plan of Care Review  Outcome: Progressing  Goal: Patient-Specific Goal (Individualized)  Outcome: Progressing  Goal: Absence of Hospital-Acquired Illness or Injury  Outcome: Progressing  Goal: Optimal Comfort and Wellbeing  Outcome: Progressing  Goal: Readiness for Transition of Care  Outcome: Progressing     Problem: Gastrointestinal Bleeding  Goal: Optimal Coping with Acute Illness  Outcome: Progressing  Goal: Hemostasis  Outcome: Progressing     Problem: Anemia  Goal: Anemia Symptom Improvement  Outcome: Progressing     Problem: Pain Acute  Goal: Optimal Pain Control and Function  Outcome: Progressing   Plan of care reviewed with patient,verbalized understanding.  SB/SR on telemetry. H&H monitored.Remains free from falls, injury. Instructed to call for assistance as needed ,verbalized understanding. Bed in low & locked position. Call light in reach, bed alarm on .

## 2024-07-06 NOTE — ASSESSMENT & PLAN NOTE
"Resume home oral pain meds  Will add Morphine to allergy/contraindicated list as to not cause more confusion to his "side effects" of morphine which is body shaking.   He has been through ED and preop anesthesia checks multiple times  Of note, he can tolerate oral oxycodone and not morphine; he does well with hydromorphone IV.      "

## 2024-07-06 NOTE — CARE UPDATE
07/05/24 2004   Patient Assessment/Suction   Level of Consciousness (AVPU) alert   Respiratory Effort Unlabored   Expansion/Accessory Muscles/Retractions no use of accessory muscles;no retractions   PRE-TX-O2   Device (Oxygen Therapy) room air   SpO2 96 %   Pulse Oximetry Type Intermittent   $ Pulse Oximetry - Multiple Charge Pulse Oximetry - Multiple   Pulse 72   Resp 18

## 2024-07-06 NOTE — PROGRESS NOTES
[facility]  Progress Note    Patient Name: Jerod Barrientos  MRN: [id number]  Patient Class: IP- Inpatient   Admission Date: 7/3/2024  Length of Stay: 1 days  Attending Physician: Anitha Warren MD  Primary Care Provider: Sai Mccracken III, MD        Subjective:     Principal Problem:GI bleed        HPI:  Jerod Barrientos is a 59-year-old male who presents emergency room for evaluation will check stools, exertional shortness a breath, and exertional chest pain.  He was recently admitted for GI bleed which required a blood transfusion.  EGD at that time failed to demonstrate any active bleeding.  He does endorse smoking cigarettes and he uses Goody powders.  He denies any significant alcohol use.  Previous medical history includes diverticulitis, depression, smoker, and obstructive sleep apnea.  ER workup: CBC with H&H of 6/18.  CMP unremarkable.  Type and screen obtained.  EKG demonstrated sinus rhythm without evidence of STEMI or ST or T-wave abnormalities.  GI was consulted.  GI recommended NPO after midnight for EGD in a.m..  Patient be transfused 2 units of packed red blood cells.  Patient admitted to Hospital Medicine for treatment management            Overview/Hospital Course:  Patient admitted with recurrent GIB. Tranfused 2 units on admit. Did not respond appropriately. GI consulted. Underwent EGD.   - Normal gastric fundus, gastric body and antrum.                          - Gastritis. Biopsied.                          - Normal pylorus.                          - Erythematous duodenopathy.                          - Non-bleeding duodenal ulcer with no stigmata of                          bleeding.                          - Normal examined duodenum.     Recommended continue PPI, added carafate and repeat CT of abdomen with contrast.   Transfuse 1 more unit PRBC 7/4/24  CTA abdomen resulted:Active GI bleed within the descending limb of the duodenum.  No  significant change.   Small cholelithiasis.   Diverticulosis.   GI notified and he was taken for a SB enteroscopy:- Normal esophagus.                          - Normal stomach.                          - Duodenitis characterized by erythema.                          - The examined portion of the jejunum was normal.                          - No specimens collected.                          -No active bleeding or old blood     Diet was advanced after procedure with GI recommendation to dc home when H/H was stable.   Went to the bedside to explain results of EGD, CTA and labs that showed another drop in H/H.  The conversation went into accusations of me not going into the room to see the patient each day he has been here x2 admissions. My visits have been witnessed by nursing staff, case management and radiology technician. House supervisor and security came to room as visitor became aggressive in her tone when speaking to me and nurse present in the room. He also claims that his request for home medication was denied which was prior to him getting his EGD and held NPO with propofol and ketamine that was to be given for EGD sedation. He reports shaking reaction to morphine which has never been listed on allergy list as he has been pre op'ed multiple times for procedures. His oral pain medication (percocet) was resumed as soon as his procedure was completed. He threatened to leave AMA but calmed down to stay for h/h checks overnight and final disposition in am.         Interval History: see hospital course; patient mostly c/o chronic back pain     Review of Systems   Constitutional:  Negative for chills and fever.   Respiratory: Negative.     Cardiovascular: Negative.    Musculoskeletal:  Positive for arthralgias and back pain.   Neurological: Negative.    Psychiatric/Behavioral:  The patient is nervous/anxious.      Objective:     Vital Signs (Most Recent):  Temp: 98 °F (36.7 °C) (07/05/24 1921)  Pulse: 72 (07/05/24  2004)  Resp: 18 (07/05/24 2004)  BP: 123/64 (07/05/24 1921)  SpO2: 96 % (07/05/24 2004) Vital Signs (24h Range):  Temp:  [98 °F (36.7 °C)-98.9 °F (37.2 °C)] 98 °F (36.7 °C)  Pulse:  [49-89] 72  Resp:  [16-20] 18  SpO2:  [94 %-100 %] 96 %  BP: ()/(49-81) 123/64     Weight: 88.9 kg (196 lb)  Body mass index is 26.58 kg/m².    Intake/Output Summary (Last 24 hours) at 7/5/2024 2112  Last data filed at 7/5/2024 1730  Gross per 24 hour   Intake 1300 ml   Output --   Net 1300 ml         Physical Exam  Constitutional:       General: He is not in acute distress.  HENT:      Head: Normocephalic and atraumatic.   Cardiovascular:      Rate and Rhythm: Regular rhythm. Bradycardia present.   Pulmonary:      Effort: Pulmonary effort is normal. No respiratory distress.      Breath sounds: No wheezing.   Abdominal:      General: There is no distension.      Palpations: Abdomen is soft.      Tenderness: There is no guarding.   Musculoskeletal:         General: No swelling or deformity. Normal range of motion.      Cervical back: Normal range of motion and neck supple.   Skin:     General: Skin is warm and dry.   Neurological:      General: No focal deficit present.      Mental Status: He is alert and oriented to person, place, and time.             Significant Labs: All pertinent labs within the past 24 hours have been reviewed.  Recent Lab Results         07/05/24  1416   07/05/24  0504        Baso # 0.01   0.03       Basophil % 0.2   0.5       Differential Method Automated   Automated       Eos # 0.1   0.1       Eos % 2.2   1.6       Gran # (ANC) 3.9   4.1       Gran % 70.8   68.1       Hematocrit 22.5   23.4       Hemoglobin 7.3   7.7       Immature Grans (Abs) 0.08  Comment: Mild elevation in immature granulocytes is non specific and   can be seen in a variety of conditions including stress response,   acute inflammation, trauma and pregnancy. Correlation with other   laboratory and clinical findings is essential.      0.10  Comment: Mild elevation in immature granulocytes is non specific and   can be seen in a variety of conditions including stress response,   acute inflammation, trauma and pregnancy. Correlation with other   laboratory and clinical findings is essential.         Immature Granulocytes 1.5   1.6       Lymph # 1.0   1.3       Lymph % 17.4   20.8       MCH 31.2   31.6       MCHC 32.4   32.9       MCV 96   96       Mono # 0.4   0.5       Mono % 7.9   7.4       MPV 9.2   9.9       nRBC 0   0       Platelet Count 203   207       RBC 2.34   2.44       RDW 15.2   15.5       WBC 5.45   6.07               Significant Imaging: I have reviewed all pertinent imaging results/findings within the past 24 hours.    Assessment/Plan:      * GI bleed  Patient has acute blood loss due to hemorrhage, the hemorrhage is due to gastrointestinal bleed, patient does not have a propensity for bleeding.. Will trend hemoglobin/hematocrit Every 4 hours, as well as monitor and correct for any coagulation defects. CBC and vital signs have been reviewed and last CBC was noted-   Lab Results   Component Value Date    WBC 5.45 07/05/2024    HGB 7.3 (L) 07/05/2024    HCT 22.5 (L) 07/05/2024    MCV 96 07/05/2024     07/05/2024         Will order a type and screen and consent patient for blood transfusion.  Will transfuse patient with 2 units of packed red blood cells tonight. Did not respond appropriately  Transfuse 1 more unit today 7/4/24  CTA of abdomen after EGD - signficant for acute bleed duodenum  GI performed SB enteroscopy - no visible bleed found  Plan to check H/H q 8 hours - if stable can dc home - if continues to drop will notify GI.   PLan for video capsule study next.     Symptomatic anemia  Patient's anemia is currently uncontrolled. Has received 2 units of PRBCs on 7/3/2024 . Etiology likely d/t acute blood loss which was from GI bleed  Current CBC reviewed-   Lab Results   Component Value Date    HGB 7.3 (L) 07/05/2024    HCT  "22.5 (L) 07/05/2024     Monitor serial CBC and transfuse if patient becomes hemodynamically unstable, symptomatic or H/H drops below 7/21.   See "GIB "    Low back pain with sciatica  Resume home oral pain meds  Will add Morphine to allergy/contraindicated list as to not cause more confusion to his "side effects" of morphine which is body shaking.   He has been through ED and preop anesthesia checks multiple times  Of note, he can tolerate oral oxycodone and not morphine; he does well with hydromorphone IV.          VTE Risk Mitigation (From admission, onward)           Ordered     IP VTE HIGH RISK PATIENT  Once         07/03/24 1900     Place sequential compression device  Until discontinued         07/03/24 1900                    Discharge Planning   AISHWARYA: 7/8/2024     Code Status: Full Code   Is the patient medically ready for discharge?:     Reason for patient still in hospital (select all that apply): Patient trending condition and Treatment  Discharge Plan A: Home                  Anitha Warren MD  Department of Hospital Medicine   [facility]Surg    "

## 2024-07-06 NOTE — ASSESSMENT & PLAN NOTE
"Patient's anemia is currently uncontrolled. Has received 2 units of PRBCs on 7/3/2024 . Etiology likely d/t acute blood loss which was from GI bleed  Current CBC reviewed-   Lab Results   Component Value Date    HGB 7.3 (L) 07/05/2024    HCT 22.5 (L) 07/05/2024     Monitor serial CBC and transfuse if patient becomes hemodynamically unstable, symptomatic or H/H drops below 7/21.   See "GIB "  "

## 2024-07-06 NOTE — ANESTHESIA POSTPROCEDURE EVALUATION
Anesthesia Post Evaluation    Patient: Jerod Barrientos    Procedure(s) Performed: Procedure(s) (LRB):  ENTEROSCOPY (N/A)    Final Anesthesia Type: general      Patient location during evaluation: PACU  Patient participation: Yes- Able to Participate  Level of consciousness: awake and alert  Post-procedure vital signs: reviewed and stable  Pain management: adequate  Airway patency: patent    PONV status at discharge: No PONV  Anesthetic complications: no      Cardiovascular status: blood pressure returned to baseline  Respiratory status: unassisted and room air  Hydration status: euvolemic  Follow-up not needed.              Vitals Value Taken Time   /61 07/06/24 0738   Temp 36.9 °C (98.4 °F) 07/06/24 0738   Pulse 55 07/06/24 0738   Resp 18 07/06/24 0809   SpO2 99 % 07/06/24 0819         Event Time   Out of Recovery 07/05/2024 14:15:43         Pain/Sanket Score: Pain Rating Prior to Med Admin: 5 (7/6/2024  8:09 AM)  Pain Rating Post Med Admin: 2 (7/5/2024  8:50 PM)  Sanket Score: 10 (7/5/2024  2:10 PM)

## 2024-07-08 ENCOUNTER — OFFICE VISIT (OUTPATIENT)
Dept: GASTROENTEROLOGY | Facility: CLINIC | Age: 59
End: 2024-07-08
Payer: COMMERCIAL

## 2024-07-08 VITALS
SYSTOLIC BLOOD PRESSURE: 127 MMHG | DIASTOLIC BLOOD PRESSURE: 68 MMHG | WEIGHT: 201.5 LBS | HEIGHT: 72 IN | HEART RATE: 70 BPM | BODY MASS INDEX: 27.29 KG/M2

## 2024-07-08 DIAGNOSIS — D62 ANEMIA DUE TO ACUTE BLOOD LOSS: ICD-10-CM

## 2024-07-08 DIAGNOSIS — D50.9 IRON DEFICIENCY ANEMIA, UNSPECIFIED IRON DEFICIENCY ANEMIA TYPE: ICD-10-CM

## 2024-07-08 DIAGNOSIS — R19.5 DARK STOOLS: ICD-10-CM

## 2024-07-08 DIAGNOSIS — R10.10 UPPER ABDOMINAL PAIN: Primary | ICD-10-CM

## 2024-07-08 DIAGNOSIS — K26.4 GASTROINTESTINAL HEMORRHAGE ASSOCIATED WITH DUODENAL ULCER: ICD-10-CM

## 2024-07-08 LAB
OHS QRS DURATION: 98 MS
OHS QTC CALCULATION: 423 MS

## 2024-07-08 PROCEDURE — 1111F DSCHRG MED/CURRENT MED MERGE: CPT | Mod: CPTII,S$GLB,,

## 2024-07-08 PROCEDURE — 3078F DIAST BP <80 MM HG: CPT | Mod: CPTII,S$GLB,,

## 2024-07-08 PROCEDURE — 3074F SYST BP LT 130 MM HG: CPT | Mod: CPTII,S$GLB,,

## 2024-07-08 PROCEDURE — 99214 OFFICE O/P EST MOD 30 MIN: CPT | Mod: S$GLB,,,

## 2024-07-08 PROCEDURE — 3008F BODY MASS INDEX DOCD: CPT | Mod: CPTII,S$GLB,,

## 2024-07-08 PROCEDURE — 99999 PR PBB SHADOW E&M-EST. PATIENT-LVL III: CPT | Mod: PBBFAC,,,

## 2024-07-09 ENCOUNTER — PATIENT OUTREACH (OUTPATIENT)
Dept: ADMINISTRATIVE | Facility: CLINIC | Age: 59
End: 2024-07-09
Payer: COMMERCIAL

## 2024-07-09 ENCOUNTER — PATIENT MESSAGE (OUTPATIENT)
Dept: GASTROENTEROLOGY | Facility: CLINIC | Age: 59
End: 2024-07-09
Payer: COMMERCIAL

## 2024-07-09 ENCOUNTER — LAB VISIT (OUTPATIENT)
Dept: LAB | Facility: HOSPITAL | Age: 59
End: 2024-07-09
Payer: COMMERCIAL

## 2024-07-09 DIAGNOSIS — D62 ANEMIA DUE TO ACUTE BLOOD LOSS: ICD-10-CM

## 2024-07-09 DIAGNOSIS — R19.5 DARK STOOLS: ICD-10-CM

## 2024-07-09 DIAGNOSIS — K26.4 GASTROINTESTINAL HEMORRHAGE ASSOCIATED WITH DUODENAL ULCER: ICD-10-CM

## 2024-07-09 LAB
ERYTHROCYTE [DISTWIDTH] IN BLOOD BY AUTOMATED COUNT: 14.3 % (ref 11.5–14.5)
FERRITIN SERPL-MCNC: 18 NG/ML (ref 20–300)
HCT VFR BLD AUTO: 27.1 % (ref 40–54)
HGB BLD-MCNC: 8 G/DL (ref 14–18)
IRON SERPL-MCNC: 15 UG/DL (ref 45–160)
MCH RBC QN AUTO: 29.7 PG (ref 27–31)
MCHC RBC AUTO-ENTMCNC: 29.5 G/DL (ref 32–36)
MCV RBC AUTO: 101 FL (ref 82–98)
PLATELET # BLD AUTO: 288 K/UL (ref 150–450)
PMV BLD AUTO: 10.2 FL (ref 9.2–12.9)
RBC # BLD AUTO: 2.69 M/UL (ref 4.6–6.2)
SATURATED IRON: 4 % (ref 20–50)
TOTAL IRON BINDING CAPACITY: 377 UG/DL (ref 250–450)
TRANSFERRIN SERPL-MCNC: 255 MG/DL (ref 200–375)
VIT B12 SERPL-MCNC: 426 PG/ML (ref 210–950)
WBC # BLD AUTO: 5.54 K/UL (ref 3.9–12.7)

## 2024-07-09 PROCEDURE — 82728 ASSAY OF FERRITIN: CPT

## 2024-07-09 PROCEDURE — 83540 ASSAY OF IRON: CPT

## 2024-07-09 PROCEDURE — 36415 COLL VENOUS BLD VENIPUNCTURE: CPT | Mod: PO

## 2024-07-09 PROCEDURE — 85027 COMPLETE CBC AUTOMATED: CPT

## 2024-07-09 PROCEDURE — 82607 VITAMIN B-12: CPT

## 2024-07-09 NOTE — PROGRESS NOTES
C3 nurse spoke with Jerod Barrientos for a TCC post hospital discharge follow up call. The patient has a scheduled HOSFU with Katja Chung NP ([facility]) on 7/10/24 at 1040. No messages routed at this time.

## 2024-07-10 ENCOUNTER — OFFICE VISIT (OUTPATIENT)
Dept: FAMILY MEDICINE | Facility: CLINIC | Age: 59
End: 2024-07-10
Payer: COMMERCIAL

## 2024-07-10 VITALS
RESPIRATION RATE: 18 BRPM | OXYGEN SATURATION: 95 % | HEIGHT: 72 IN | HEART RATE: 71 BPM | TEMPERATURE: 98 F | WEIGHT: 200.38 LBS | BODY MASS INDEX: 27.14 KG/M2

## 2024-07-10 DIAGNOSIS — Z87.19 HISTORY OF GI BLEED: Primary | ICD-10-CM

## 2024-07-10 DIAGNOSIS — I10 HYPERTENSION, ESSENTIAL: ICD-10-CM

## 2024-07-10 DIAGNOSIS — Z63.8 STRESS DUE TO FAMILY TENSION: ICD-10-CM

## 2024-07-10 PROBLEM — K92.2 GI BLEED: Status: RESOLVED | Noted: 2024-06-28 | Resolved: 2024-07-10

## 2024-07-10 PROCEDURE — 99999 PR PBB SHADOW E&M-EST. PATIENT-LVL IV: CPT | Mod: PBBFAC,,, | Performed by: NURSE PRACTITIONER

## 2024-07-10 PROCEDURE — 1159F MED LIST DOCD IN RCRD: CPT | Mod: CPTII,S$GLB,, | Performed by: NURSE PRACTITIONER

## 2024-07-10 PROCEDURE — 3008F BODY MASS INDEX DOCD: CPT | Mod: CPTII,S$GLB,, | Performed by: NURSE PRACTITIONER

## 2024-07-10 PROCEDURE — 99214 OFFICE O/P EST MOD 30 MIN: CPT | Mod: S$GLB,,, | Performed by: NURSE PRACTITIONER

## 2024-07-10 PROCEDURE — 1160F RVW MEDS BY RX/DR IN RCRD: CPT | Mod: CPTII,S$GLB,, | Performed by: NURSE PRACTITIONER

## 2024-07-10 PROCEDURE — 1111F DSCHRG MED/CURRENT MED MERGE: CPT | Mod: CPTII,S$GLB,, | Performed by: NURSE PRACTITIONER

## 2024-07-10 RX ORDER — ALPRAZOLAM 0.5 MG/1
0.5 TABLET ORAL NIGHTLY PRN
Qty: 30 TABLET | Refills: 1 | Status: SHIPPED | OUTPATIENT
Start: 2024-07-10 | End: 2024-08-09

## 2024-07-10 RX ORDER — ONDANSETRON 4 MG/1
4 TABLET, ORALLY DISINTEGRATING ORAL EVERY 8 HOURS PRN
Qty: 20 TABLET | Refills: 0 | Status: SHIPPED | OUTPATIENT
Start: 2024-07-10

## 2024-07-10 RX ORDER — SERTRALINE HYDROCHLORIDE 50 MG/1
50 TABLET, FILM COATED ORAL DAILY
Qty: 30 TABLET | Refills: 3 | Status: SHIPPED | OUTPATIENT
Start: 2024-07-10 | End: 2025-07-10

## 2024-07-10 SDOH — SOCIAL DETERMINANTS OF HEALTH (SDOH): OTHER SPECIFIED PROBLEMS RELATED TO PRIMARY SUPPORT GROUP: Z63.8

## 2024-07-10 NOTE — PROGRESS NOTES
"    SUBJECTIVE:      Patient ID: Jerod Barrientos is a 59 y.o. male.    Chief Complaint: Follow-up (Hospital)    HPI  Is here for hospital follow up  Is taking currently norvasc 10 mg daily  And carafate protonix and pain pills    Bp is elevated today in clinic    Still smoking ciggarrettes    Was on liquid diet while in hospital    Denies chest pain  Denies sob  Denies fever  Denies chills    No blood in stool per patient  Has "gurgling" sounds in abdominal area    Has eaten cow tongue since being out of hospital  No pain with eating  Slow on bowel movement  Some constipation  Minimal gas per patient as well    O garner with gi- recommends another colonoscopy in 2026  Will also get another cbc and iron    Is highly stressed- hospital, son, business, pain  Continue b 12 and iron otc as well      Past Surgical History:   Procedure Laterality Date    COLONOSCOPY N/A 04/24/2023    Procedure: COLONOSCOPY;  Surgeon: Yunior Major MD;  Location: [facility];  Service: Endoscopy;  Laterality: N/A;  lm/ppm    ESOPHAGOGASTRODUODENOSCOPY  2012    ESOPHAGOGASTRODUODENOSCOPY N/A 7/6/2023    Procedure: EGD (ESOPHAGOGASTRODUODENOSCOPY);  Surgeon: Yunior Major MD;  Location: [facility];  Service: Endoscopy;  Laterality: N/A;    ESOPHAGOGASTRODUODENOSCOPY N/A 6/29/2024    Procedure: EGD (ESOPHAGOGASTRODUODENOSCOPY);  Surgeon: Kaitlyn Pruitt MD;  Location: [facility];  Service: Endoscopy;  Laterality: N/A;    ESOPHAGOGASTRODUODENOSCOPY N/A 7/4/2024    Procedure: EGD (ESOPHAGOGASTRODUODENOSCOPY);  Surgeon: Niles Butler Jr., MD;  Location: [facility];  Service: Endoscopy;  Laterality: N/A;    INJECTION OF ANESTHETIC AGENT AROUND MEDIAL BRANCH NERVES INNERVATING LUMBAR FACET JOINT Left 05/16/2023    Procedure: Block-nerve-medial branch-lumbar;  Surgeon: Syed Betancourt MD;  [facility];  Service: Pain Management;  Laterality: Left;  L3,4,5    NECK SURGERY      SMALL BOWEL ENTEROSCOPY N/A 7/5/2024    " Procedure: ENTEROSCOPY;  Surgeon: Kaitlyn Pruitt MD;  Location: [facility];  Service: Endoscopy;  Laterality: N/A;     Family History   Problem Relation Name Age of Onset    Cancer Mother          lung cancer    Alcohol abuse Father      Cancer Maternal Grandfather          Throat cancer      Social History     Socioeconomic History    Marital status:     Number of children: 2   Occupational History    Occupation: Self Employed in [employer]   Tobacco Use    Smoking status: Former     Types: Cigarettes    Smokeless tobacco: Never   Substance and Sexual Activity    Alcohol use: Never    Drug use: Never    Sexual activity: Yes     Partners: Female   Social History Narrative        2 dogs living in home     Current Outpatient Medications   Medication Sig Dispense Refill    amLODIPine (NORVASC) 10 MG tablet TAKE 1 TABLET BY MOUTH EVERY DAY (Patient taking differently: Take 10 mg by mouth once daily.) 90 tablet 1    oxyCODONE-acetaminophen (PERCOCET) 7.5-325 mg per tablet Take 1 tablet by mouth 3 (three) times daily.      pantoprazole (PROTONIX) 40 MG tablet Take 1 tablet (40 mg total) by mouth once daily. 90 tablet 0    sucralfate (CARAFATE) 1 gram tablet Take 2 tablets (2 g total) by mouth 4 (four) times daily before meals and nightly. for 8 days 64 tablet 0    ALPRAZolam (XANAX) 0.5 MG tablet Take 1 tablet (0.5 mg total) by mouth nightly as needed for Anxiety. 30 tablet 1    ondansetron (ZOFRAN-ODT) 4 MG TbDL Take 1 tablet (4 mg total) by mouth every 8 (eight) hours as needed (nausea). 20 tablet 0    sertraline (ZOLOFT) 50 MG tablet Take 1 tablet (50 mg total) by mouth once daily. 30 tablet 3     No current facility-administered medications for this visit.     Review of patient's allergies indicates:   Allergen Reactions    Opioids - morphine analogues Other (See Comments)     Shaking       Past Medical History:   Diagnosis Date    Hypertension     Sleep apnea      Past Surgical  History:   Procedure Laterality Date    COLONOSCOPY N/A 04/24/2023    Procedure: COLONOSCOPY;  Surgeon: Yunior Major MD;  Location: [facility];  Service: Endoscopy;  Laterality: N/A;  lm/ppm    ESOPHAGOGASTRODUODENOSCOPY  2012    ESOPHAGOGASTRODUODENOSCOPY N/A 7/6/2023    Procedure: EGD (ESOPHAGOGASTRODUODENOSCOPY);  Surgeon: Yunior Major MD;  Location: [facility];  Service: Endoscopy;  Laterality: N/A;    ESOPHAGOGASTRODUODENOSCOPY N/A 6/29/2024    Procedure: EGD (ESOPHAGOGASTRODUODENOSCOPY);  Surgeon: Kaitlyn Pruitt MD;  Location: [facility];  Service: Endoscopy;  Laterality: N/A;    ESOPHAGOGASTRODUODENOSCOPY N/A 7/4/2024    Procedure: EGD (ESOPHAGOGASTRODUODENOSCOPY);  Surgeon: Niles Butler Jr., MD;  Location: [facility];  Service: Endoscopy;  Laterality: N/A;    INJECTION OF ANESTHETIC AGENT AROUND MEDIAL BRANCH NERVES INNERVATING LUMBAR FACET JOINT Left 05/16/2023    Procedure: Block-nerve-medial branch-lumbar;  Surgeon: Syed Betancourt MD;  [facility]  Service: [facility] Management;  Laterality: Left;  L3,4,5    NECK SURGERY      SMALL BOWEL ENTEROSCOPY N/A 7/5/2024    Procedure: ENTEROSCOPY;  Surgeon: Kaitlyn Pruitt MD;  Location: [facility];  Service: Endoscopy;  Laterality: N/A;       Review of Systems   All other systems reviewed and are negative.     OBJECTIVE:      Vitals:    07/10/24 1039   Pulse: 71   Resp: 18   Temp: 98.3 °F (36.8 °C)   SpO2: 95%   Weight: 90.9 kg (200 lb 6.4 oz)   Height: 6' (1.829 m)     Physical Exam  Vitals and nursing note reviewed.   Constitutional:       Appearance: Normal appearance. He is normal weight.   HENT:      Head: Normocephalic and atraumatic.   Eyes:      Pupils: Pupils are equal, round, and reactive to light.   Cardiovascular:      Rate and Rhythm: Normal rate and regular rhythm.      Pulses: Normal pulses.      Heart sounds: Normal heart sounds.   Pulmonary:      Effort: Pulmonary effort is normal.      Breath sounds: Normal breath  sounds.   Abdominal:      General: Abdomen is flat. Bowel sounds are normal.      Palpations: Abdomen is soft.   Musculoskeletal:      Cervical back: Normal range of motion.   Skin:     General: Skin is warm and dry.   Neurological:      General: No focal deficit present.      Mental Status: He is alert and oriented to person, place, and time. Mental status is at baseline.   Psychiatric:         Mood and Affect: Mood normal.         Behavior: Behavior normal.         Thought Content: Thought content normal.         Judgment: Judgment normal.      Comments: nonsuicidal      Assessment:       1. History of GI bleed    2. Hypertension, essential    3. Stress due to family tension        Plan:       History of GI bleed  -     CBC Auto Differential; Future; Expected date: 07/10/2024  -     Ferritin; Future; Expected date: 07/10/2024  -     IRON AND TIBC; Future; Expected date: 07/10/2024    Hypertension, essential    Stress due to family tension  -     sertraline (ZOLOFT) 50 MG tablet; Take 1 tablet (50 mg total) by mouth once daily.  Dispense: 30 tablet; Refill: 3  -     ondansetron (ZOFRAN-ODT) 4 MG TbDL; Take 1 tablet (4 mg total) by mouth every 8 (eight) hours as needed (nausea).  Dispense: 20 tablet; Refill: 0  -     ALPRAZolam (XANAX) 0.5 MG tablet; Take 1 tablet (0.5 mg total) by mouth nightly as needed for Anxiety.  Dispense: 30 tablet; Refill: 1    Take medications as ordered  Get labs done in 2 weeks  Follow up in one month or sooner if needed  See if control stress that bp may come down      No follow-ups on file.      7/10/2024 TOO Dior, FNP

## 2024-07-24 ENCOUNTER — LAB VISIT (OUTPATIENT)
Dept: LAB | Facility: HOSPITAL | Age: 59
End: 2024-07-24
Attending: NURSE PRACTITIONER
Payer: COMMERCIAL

## 2024-07-24 DIAGNOSIS — Z87.19 HISTORY OF GI BLEED: ICD-10-CM

## 2024-07-24 LAB
BASOPHILS # BLD AUTO: 0.05 K/UL (ref 0–0.2)
BASOPHILS NFR BLD: 1 % (ref 0–1.9)
DIFFERENTIAL METHOD BLD: ABNORMAL
EOSINOPHIL # BLD AUTO: 0.1 K/UL (ref 0–0.5)
EOSINOPHIL NFR BLD: 2.2 % (ref 0–8)
ERYTHROCYTE [DISTWIDTH] IN BLOOD BY AUTOMATED COUNT: 13.5 % (ref 11.5–14.5)
FERRITIN SERPL-MCNC: 15 NG/ML (ref 20–300)
HCT VFR BLD AUTO: 33.8 % (ref 40–54)
HGB BLD-MCNC: 9.9 G/DL (ref 14–18)
IMM GRANULOCYTES # BLD AUTO: 0.05 K/UL (ref 0–0.04)
IMM GRANULOCYTES NFR BLD AUTO: 1 % (ref 0–0.5)
IRON SERPL-MCNC: 16 UG/DL (ref 45–160)
LYMPHOCYTES # BLD AUTO: 1.2 K/UL (ref 1–4.8)
LYMPHOCYTES NFR BLD: 24.3 % (ref 18–48)
MCH RBC QN AUTO: 27.3 PG (ref 27–31)
MCHC RBC AUTO-ENTMCNC: 29.3 G/DL (ref 32–36)
MCV RBC AUTO: 93 FL (ref 82–98)
MONOCYTES # BLD AUTO: 0.5 K/UL (ref 0.3–1)
MONOCYTES NFR BLD: 10.1 % (ref 4–15)
NEUTROPHILS # BLD AUTO: 3 K/UL (ref 1.8–7.7)
NEUTROPHILS NFR BLD: 61.4 % (ref 38–73)
NRBC BLD-RTO: 0 /100 WBC
PLATELET # BLD AUTO: 286 K/UL (ref 150–450)
PMV BLD AUTO: 10.4 FL (ref 9.2–12.9)
RBC # BLD AUTO: 3.63 M/UL (ref 4.6–6.2)
SATURATED IRON: 3 % (ref 20–50)
TOTAL IRON BINDING CAPACITY: 527 UG/DL (ref 250–450)
TRANSFERRIN SERPL-MCNC: 356 MG/DL (ref 200–375)
WBC # BLD AUTO: 4.94 K/UL (ref 3.9–12.7)

## 2024-07-24 PROCEDURE — 36415 COLL VENOUS BLD VENIPUNCTURE: CPT | Mod: PO | Performed by: NURSE PRACTITIONER

## 2024-07-24 PROCEDURE — 83540 ASSAY OF IRON: CPT | Performed by: NURSE PRACTITIONER

## 2024-07-24 PROCEDURE — 82728 ASSAY OF FERRITIN: CPT | Performed by: NURSE PRACTITIONER

## 2024-07-24 PROCEDURE — 85025 COMPLETE CBC W/AUTO DIFF WBC: CPT | Performed by: NURSE PRACTITIONER

## 2024-07-25 ENCOUNTER — HOSPITAL ENCOUNTER (OUTPATIENT)
Facility: HOSPITAL | Age: 59
Discharge: HOME | End: 2024-07-25
Attending: INTERNAL MEDICINE | Admitting: INTERNAL MEDICINE
Payer: COMMERCIAL

## 2024-07-25 VITALS
OXYGEN SATURATION: 97 % | RESPIRATION RATE: 16 BRPM | HEART RATE: 58 BPM | SYSTOLIC BLOOD PRESSURE: 135 MMHG | DIASTOLIC BLOOD PRESSURE: 78 MMHG | TEMPERATURE: 99 F

## 2024-07-25 DIAGNOSIS — D50.9 IRON DEFICIENCY ANEMIA: ICD-10-CM

## 2024-07-25 PROCEDURE — 91110 GI TRC IMG INTRAL ESOPH-ILE: CPT | Mod: TC | Performed by: INTERNAL MEDICINE

## 2024-07-25 PROCEDURE — 25000003 PHARM REV CODE 250: Performed by: INTERNAL MEDICINE

## 2024-07-25 RX ORDER — DEXTROMETHORPHAN/PSEUDOEPHED 2.5-7.5/.8
40 DROPS ORAL ONCE
Status: COMPLETED | OUTPATIENT
Start: 2024-07-25 | End: 2024-07-25

## 2024-07-25 RX ADMIN — SIMETHICONE 40 MG: 20 SUSPENSION/ DROPS ORAL at 07:07

## 2024-07-25 NOTE — H&P
[facility] Note    CC: MEHDI    HPI 59 y.o. male presents for VCE due to MEHDI without source found on upper or lower endoscopy    Past Medical History:   Diagnosis Date    Hypertension     Sleep apnea        Allergies and Medications reviewed       Assessment:   59 y.o. male presents for VCE    Plan:  -proceed to VCE    Kaitlyn Pruitt MD  [facility]  [address], [address]  [place] [address]  Office: [phone]  Fax: [phone]

## 2024-07-25 NOTE — DISCHARGE INSTRUCTIONS
Capsule Discharge Instructions     You have just swallowed a capsule endoscope.  This contains information about what to expect over the next 8 hours.  Please call our office if you have severe or persistent abdominal or chest pain, fever, difficulty swallowing or if you have any questions.  Our phone number is [phone].    Time Capsule ingested:_______________    You may drink clear liquids (water, apple juice) 4 hours after swallowing the capsule.  You may eat a light meal 6 hours after swallowing the capsule.  Medications may be resumed at 6 hours after swallowing the capsule.  Do not exercise, avoid heavy lifting.  You may walk, sit and lay down.  You can drive a car.  You may return to work, if you work allows avoiding unsuitable environments and /or physical movements.  Avoid going near MRI machines and radio transmitters.  You may use a computer, cell phone, radio or stereo.  Do not stand directly next to another person undergoing capsule endoscopy.  Try not to touch the recorder or the sensor array leads.  Do not remove the leads before 8 hours.  Avoid getting the data recorder or sensor array leads wet.  You may loosen the belt to allow yourself to go to the bathroom.  Do not take the belt off until the 8 hours have passed.  Observe the LED light on the data recorder at least every 15 minutes.  If the light stops blinking, document the time and call our office.  Return the unit to the hospital at the completion of 8 hour time frame.    May have clear liquids at ______________    May have light snack and medications at ______________    May remove the unit at ______________    Return the unit to Registration Desk at the completion of the study.    Post Capsule Instructions     This information is what to expect over the next 2 days.  Please call us or your doctor if you have severe or persistent abdominal or chest pain, fever, difficulty swallowing, or if you have any questions.  Our phone number is  [phone].    Pain:  Pain is uncommon following capsule endoscopy.  Should you feel sharp or persistent pain, please call your doctor's office.  Nausea:  Nausea is also very uncommon and should it occur, please notify your doctor's office  Diet:  You may eat and drink with no restrictions 8 hours after ingesting capsule.  Activities:  Following the exam you may resume normal activities, including exercise.  Medications:  You may resume your medications 6 hours after ingesting the capsule.  Do NOT make up doses you have missed, just resume your normal dosage.  Further Testing:  Until the capsule passes, further testing which includes any type of MRI should be avoided.  If you have any type of MRI examination scheduled in the next 30 days, it should be postponed.  The Capsule:  The capsule passes naturally in a bowel movement typically in about 24 hours.  Most likely, you will be unaware of its passage.  It does not need to be retrieved and can safely be flushed down the toilet.  Occasionally the capsule light will still be flashing when it passes.  Should you be concerned that the capsule didn't pass, in the absence of symptoms; an abdominal x-ray can be obtained after 7 days to confirm its passage.  The  will make a beeping noise when finished.  It will shut off automatically.

## 2024-07-28 DIAGNOSIS — A04.8 H. PYLORI INFECTION: Primary | ICD-10-CM

## 2024-07-28 DIAGNOSIS — D50.0 IRON DEFICIENCY ANEMIA DUE TO CHRONIC BLOOD LOSS: Primary | ICD-10-CM

## 2024-07-28 DIAGNOSIS — D50.0 IRON DEFICIENCY ANEMIA DUE TO CHRONIC BLOOD LOSS: ICD-10-CM

## 2024-07-28 PROCEDURE — 91110 GI TRC IMG INTRAL ESOPH-ILE: CPT | Mod: 26,,, | Performed by: INTERNAL MEDICINE

## 2024-07-28 RX ORDER — SODIUM CHLORIDE 0.9 % (FLUSH) 0.9 %
10 SYRINGE (ML) INJECTION
Status: CANCELLED | OUTPATIENT
Start: 2024-07-28

## 2024-07-28 RX ORDER — EPINEPHRINE 0.3 MG/.3ML
0.3 INJECTION SUBCUTANEOUS ONCE AS NEEDED
OUTPATIENT
Start: 2024-07-28

## 2024-07-28 RX ORDER — HEPARIN 100 UNIT/ML
5 SYRINGE INTRAVENOUS
OUTPATIENT
Start: 2024-07-28

## 2024-07-28 RX ORDER — BISMUTH SUBCITRATE POTASSIUM, METRONIDAZOLE AND TETRACYCLINE HYDROCHLORIDE 140; 125; 125 MG/1; MG/1; MG/1
3 CAPSULE ORAL
Qty: 120 CAPSULE | Refills: 0 | Status: SHIPPED | OUTPATIENT
Start: 2024-07-28 | End: 2024-08-07

## 2024-07-28 RX ORDER — DIPHENHYDRAMINE HYDROCHLORIDE 50 MG/ML
50 INJECTION INTRAMUSCULAR; INTRAVENOUS ONCE AS NEEDED
OUTPATIENT
Start: 2024-07-28

## 2024-07-28 RX ORDER — PANTOPRAZOLE SODIUM 40 MG/1
40 TABLET, DELAYED RELEASE ORAL 2 TIMES DAILY
Qty: 20 TABLET | Refills: 0 | Status: SHIPPED | OUTPATIENT
Start: 2024-07-28 | End: 2024-08-07

## 2024-07-28 RX ORDER — SODIUM CHLORIDE 9 MG/ML
INJECTION, SOLUTION INTRAVENOUS CONTINUOUS
Status: CANCELLED | OUTPATIENT
Start: 2024-07-28

## 2024-07-28 NOTE — PROVATION PATIENT INSTRUCTIONS
Discharge Summary/Instructions after an Endoscopic Procedure  Patient Name: Jerod Penaloza  Patient MRN: [id number]  Patient YOB: 1965  Thursday, July 25, 2024  Kaitlyn Pruitt MD  Dear patient,  As a result of recent federal legislation (The Federal Cures Act), you may   receive lab or pathology results from your procedure in your [facility]   account before your physician is able to contact you. Your physician or   their representative will relay the results to you with their   recommendations at their soonest availability.  Thank you,  RESTRICTIONS:  During your procedure today, you received medications for sedation.  These   medications may affect your judgment, balance and coordination.  Therefore,   for 24 hours, you have the following restrictions:   - DO NOT drive a car, operate machinery, make legal/financial decisions,   sign important papers or drink alcohol.    ACTIVITY:  Today: no heavy lifting, straining or running due to procedural   sedation/anesthesia.  The following day: return to full activity including work.  DIET:  Eat and drink normally unless instructed otherwise.     TREATMENT FOR COMMON SIDE EFFECTS:  - Mild abdominal pain, nausea, belching, bloating or excessive gas:  rest,   eat lightly and use a heating pad.  - Sore Throat: treat with throat lozenges and/or gargle with warm salt   water.  - Because air was used during the procedure, expelling large amounts of air   from your rectum or belching is normal.  - If a bowel prep was taken, you may not have a bowel movement for 1-3 days.    This is normal.  SYMPTOMS TO WATCH FOR AND REPORT TO YOUR PHYSICIAN:  1. Abdominal pain or bloating, other than gas cramps.  2. Chest pain.  3. Back pain.  4. Signs of infection such as: chills or fever occurring within 24 hours   after the procedure.  5. Rectal bleeding, which would show as bright red, maroon, or black stools.   (A tablespoon of blood from the rectum is not serious,  especially if   hemorrhoids are present.)  6. Vomiting.  7. Weakness or dizziness.  GO DIRECTLY TO THE NEAREST EMERGENCY ROOM IF YOU HAVE ANY OF THE FOLLOWING:      Difficulty breathing              Chills and/or fever over 101 F   Persistent vomiting and/or vomiting blood   Severe abdominal pain   Severe chest pain   Black, tarry stools   Bleeding- more than one tablespoon   Any other symptom or condition that you feel may need urgent attention  Your doctor recommends these additional instructions:  If any biopsies were taken, your doctors clinic will contact you in 1 to 2   weeks with any results.  -Schedule IV iron infusion  -Check fecal calprotectin and CRP , consider repeat colonoscopy with TI   intubation based on result  For questions, problems or results please call your physician - Kaitlyn Pruitt MD at Work:  [phone].  [facility], EMERGENCY ROOM PHONE NUMBER: [phone]  IF A COMPLICATION OR EMERGENCY SITUATION ARISES AND YOU ARE UNABLE TO REACH   YOUR PHYSICIAN - GO DIRECTLY TO THE EMERGENCY ROOM.  Kaitlyn Pruitt MD  7/28/2024 9:51:03 AM  This report has been verified and signed electronically.  Dear patient,  As a result of recent federal legislation (The Federal Cures Act), you may   receive lab or pathology results from your procedure in your [facility]   account before your physician is able to contact you. Your physician or   their representative will relay the results to you with their   recommendations at their soonest availability.  Thank you,  PROVATION

## 2024-07-29 ENCOUNTER — PATIENT MESSAGE (OUTPATIENT)
Dept: GASTROENTEROLOGY | Facility: CLINIC | Age: 59
End: 2024-07-29
Payer: COMMERCIAL

## 2024-07-30 ENCOUNTER — LAB VISIT (OUTPATIENT)
Dept: LAB | Facility: HOSPITAL | Age: 59
End: 2024-07-30
Attending: INTERNAL MEDICINE
Payer: COMMERCIAL

## 2024-07-30 DIAGNOSIS — D50.0 IRON DEFICIENCY ANEMIA DUE TO CHRONIC BLOOD LOSS: ICD-10-CM

## 2024-07-30 DIAGNOSIS — A04.8 H. PYLORI INFECTION: ICD-10-CM

## 2024-07-30 LAB — CRP SERPL-MCNC: 5.2 MG/L (ref 0–8.2)

## 2024-07-30 PROCEDURE — 86140 C-REACTIVE PROTEIN: CPT | Performed by: INTERNAL MEDICINE

## 2024-07-30 PROCEDURE — 87338 HPYLORI STOOL AG IA: CPT | Performed by: INTERNAL MEDICINE

## 2024-07-30 PROCEDURE — 83993 ASSAY FOR CALPROTECTIN FECAL: CPT | Performed by: INTERNAL MEDICINE

## 2024-07-30 PROCEDURE — 36415 COLL VENOUS BLD VENIPUNCTURE: CPT | Mod: PO | Performed by: INTERNAL MEDICINE

## 2024-07-31 ENCOUNTER — INFUSION (OUTPATIENT)
Dept: INFUSION THERAPY | Facility: HOSPITAL | Age: 59
End: 2024-07-31
Attending: INTERNAL MEDICINE
Payer: COMMERCIAL

## 2024-07-31 VITALS
OXYGEN SATURATION: 99 % | BODY MASS INDEX: 27.36 KG/M2 | RESPIRATION RATE: 16 BRPM | HEIGHT: 72 IN | TEMPERATURE: 99 F | HEART RATE: 57 BPM | WEIGHT: 202 LBS | SYSTOLIC BLOOD PRESSURE: 118 MMHG | DIASTOLIC BLOOD PRESSURE: 77 MMHG

## 2024-07-31 DIAGNOSIS — D50.0 IRON DEFICIENCY ANEMIA DUE TO CHRONIC BLOOD LOSS: Primary | ICD-10-CM

## 2024-07-31 PROCEDURE — A4216 STERILE WATER/SALINE, 10 ML: HCPCS | Performed by: INTERNAL MEDICINE

## 2024-07-31 PROCEDURE — 96365 THER/PROPH/DIAG IV INF INIT: CPT

## 2024-07-31 PROCEDURE — 63600175 PHARM REV CODE 636 W HCPCS: Mod: JZ,JG | Performed by: INTERNAL MEDICINE

## 2024-07-31 PROCEDURE — 25000003 PHARM REV CODE 250: Performed by: INTERNAL MEDICINE

## 2024-07-31 RX ORDER — DIPHENHYDRAMINE HYDROCHLORIDE 50 MG/ML
50 INJECTION INTRAMUSCULAR; INTRAVENOUS ONCE AS NEEDED
OUTPATIENT
Start: 2024-08-07

## 2024-07-31 RX ORDER — SODIUM CHLORIDE 0.9 % (FLUSH) 0.9 %
10 SYRINGE (ML) INJECTION
OUTPATIENT
Start: 2024-08-07

## 2024-07-31 RX ORDER — EPINEPHRINE 0.3 MG/.3ML
0.3 INJECTION SUBCUTANEOUS ONCE AS NEEDED
OUTPATIENT
Start: 2024-08-07

## 2024-07-31 RX ORDER — SODIUM CHLORIDE 9 MG/ML
INJECTION, SOLUTION INTRAVENOUS CONTINUOUS
Status: DISCONTINUED | OUTPATIENT
Start: 2024-07-31 | End: 2024-07-31 | Stop reason: HOSPADM

## 2024-07-31 RX ORDER — SODIUM CHLORIDE 9 MG/ML
INJECTION, SOLUTION INTRAVENOUS CONTINUOUS
OUTPATIENT
Start: 2024-08-07

## 2024-07-31 RX ORDER — SODIUM CHLORIDE 0.9 % (FLUSH) 0.9 %
10 SYRINGE (ML) INJECTION
Status: DISCONTINUED | OUTPATIENT
Start: 2024-07-31 | End: 2024-07-31 | Stop reason: HOSPADM

## 2024-07-31 RX ORDER — HEPARIN 100 UNIT/ML
5 SYRINGE INTRAVENOUS
OUTPATIENT
Start: 2024-08-07

## 2024-07-31 RX ADMIN — SODIUM CHLORIDE: 9 INJECTION, SOLUTION INTRAVENOUS at 02:07

## 2024-07-31 RX ADMIN — Medication 10 ML: at 02:07

## 2024-07-31 RX ADMIN — FERRIC CARBOXYMALTOSE INJECTION 750 MG: 50 INJECTION, SOLUTION INTRAVENOUS at 02:07

## 2024-07-31 NOTE — PLAN OF CARE
Problem: Adult Inpatient Plan of Care  Goal: Optimal Comfort and Wellbeing  Outcome: Progressing  Intervention: Provide Person-Centered Care  Flowsheets (Taken 7/31/2024 8283)  Trust Relationship/Rapport:   care explained   choices provided   emotional support provided   empathic listening provided   questions answered   questions encouraged   reassurance provided   thoughts/feelings acknowledged

## 2024-08-02 LAB — CALPROTECTIN STL-MCNT: 96.2 MCG/G

## 2024-08-07 ENCOUNTER — INFUSION (OUTPATIENT)
Dept: INFUSION THERAPY | Facility: HOSPITAL | Age: 59
End: 2024-08-07
Attending: INTERNAL MEDICINE
Payer: COMMERCIAL

## 2024-08-07 VITALS
BODY MASS INDEX: 27.36 KG/M2 | HEART RATE: 66 BPM | OXYGEN SATURATION: 98 % | TEMPERATURE: 98 F | DIASTOLIC BLOOD PRESSURE: 80 MMHG | RESPIRATION RATE: 18 BRPM | SYSTOLIC BLOOD PRESSURE: 145 MMHG | WEIGHT: 202 LBS | HEIGHT: 72 IN

## 2024-08-07 DIAGNOSIS — D50.0 IRON DEFICIENCY ANEMIA DUE TO CHRONIC BLOOD LOSS: Primary | ICD-10-CM

## 2024-08-07 PROCEDURE — 25000003 PHARM REV CODE 250: Performed by: INTERNAL MEDICINE

## 2024-08-07 PROCEDURE — 63600175 PHARM REV CODE 636 W HCPCS: Mod: JZ,JG | Performed by: INTERNAL MEDICINE

## 2024-08-07 PROCEDURE — A4216 STERILE WATER/SALINE, 10 ML: HCPCS | Performed by: INTERNAL MEDICINE

## 2024-08-07 PROCEDURE — 96365 THER/PROPH/DIAG IV INF INIT: CPT

## 2024-08-07 RX ORDER — SODIUM CHLORIDE 0.9 % (FLUSH) 0.9 %
10 SYRINGE (ML) INJECTION
Status: CANCELLED | OUTPATIENT
Start: 2024-08-14

## 2024-08-07 RX ORDER — SODIUM CHLORIDE 9 MG/ML
INJECTION, SOLUTION INTRAVENOUS CONTINUOUS
Status: CANCELLED | OUTPATIENT
Start: 2024-08-14

## 2024-08-07 RX ORDER — HEPARIN 100 UNIT/ML
5 SYRINGE INTRAVENOUS
OUTPATIENT
Start: 2024-08-14

## 2024-08-07 RX ORDER — SODIUM CHLORIDE 0.9 % (FLUSH) 0.9 %
10 SYRINGE (ML) INJECTION
Status: DISCONTINUED | OUTPATIENT
Start: 2024-08-07 | End: 2024-08-07

## 2024-08-07 RX ORDER — EPINEPHRINE 0.3 MG/.3ML
0.3 INJECTION SUBCUTANEOUS ONCE AS NEEDED
OUTPATIENT
Start: 2024-08-14

## 2024-08-07 RX ORDER — SODIUM CHLORIDE 9 MG/ML
INJECTION, SOLUTION INTRAVENOUS CONTINUOUS
Status: DISCONTINUED | OUTPATIENT
Start: 2024-08-07 | End: 2024-08-07

## 2024-08-07 RX ORDER — DIPHENHYDRAMINE HYDROCHLORIDE 50 MG/ML
50 INJECTION INTRAMUSCULAR; INTRAVENOUS ONCE AS NEEDED
OUTPATIENT
Start: 2024-08-14

## 2024-08-07 RX ADMIN — FERRIC CARBOXYMALTOSE INJECTION 750 MG: 50 INJECTION, SOLUTION INTRAVENOUS at 02:08

## 2024-08-07 RX ADMIN — Medication 10 ML: at 02:08

## 2024-08-07 RX ADMIN — SODIUM CHLORIDE: 9 INJECTION, SOLUTION INTRAVENOUS at 02:08

## 2024-08-09 LAB — H PYLORI AG STL QL IA: NOT DETECTED

## 2024-08-12 ENCOUNTER — PATIENT MESSAGE (OUTPATIENT)
Dept: FAMILY MEDICINE | Facility: CLINIC | Age: 59
End: 2024-08-12
Payer: COMMERCIAL

## 2024-08-12 ENCOUNTER — PATIENT MESSAGE (OUTPATIENT)
Dept: GASTROENTEROLOGY | Facility: CLINIC | Age: 59
End: 2024-08-12
Payer: COMMERCIAL

## 2024-08-15 DIAGNOSIS — D50.0 IRON DEFICIENCY ANEMIA DUE TO CHRONIC BLOOD LOSS: Primary | ICD-10-CM

## 2024-08-28 ENCOUNTER — OFFICE VISIT (OUTPATIENT)
Dept: FAMILY MEDICINE | Facility: CLINIC | Age: 59
End: 2024-08-28
Payer: COMMERCIAL

## 2024-08-28 VITALS
SYSTOLIC BLOOD PRESSURE: 140 MMHG | HEART RATE: 67 BPM | DIASTOLIC BLOOD PRESSURE: 80 MMHG | HEIGHT: 72 IN | BODY MASS INDEX: 27.1 KG/M2 | TEMPERATURE: 98 F | RESPIRATION RATE: 18 BRPM | WEIGHT: 200.06 LBS | OXYGEN SATURATION: 97 %

## 2024-08-28 DIAGNOSIS — M54.50 CHRONIC LEFT-SIDED LOW BACK PAIN WITHOUT SCIATICA: ICD-10-CM

## 2024-08-28 DIAGNOSIS — G47.33 OSA ON CPAP: ICD-10-CM

## 2024-08-28 DIAGNOSIS — D50.0 IRON DEFICIENCY ANEMIA DUE TO CHRONIC BLOOD LOSS: ICD-10-CM

## 2024-08-28 DIAGNOSIS — K21.9 GASTROESOPHAGEAL REFLUX DISEASE, UNSPECIFIED WHETHER ESOPHAGITIS PRESENT: ICD-10-CM

## 2024-08-28 DIAGNOSIS — Z12.5 SCREENING PSA (PROSTATE SPECIFIC ANTIGEN): Primary | ICD-10-CM

## 2024-08-28 DIAGNOSIS — G47.00 INSOMNIA, UNSPECIFIED TYPE: ICD-10-CM

## 2024-08-28 DIAGNOSIS — I10 HYPERTENSION, ESSENTIAL: ICD-10-CM

## 2024-08-28 DIAGNOSIS — Z74.09 IMPAIRED FUNCTIONAL MOBILITY AND ACTIVITY TOLERANCE: ICD-10-CM

## 2024-08-28 DIAGNOSIS — G89.29 CHRONIC LEFT-SIDED LOW BACK PAIN WITHOUT SCIATICA: ICD-10-CM

## 2024-08-28 DIAGNOSIS — Z63.8 STRESS DUE TO FAMILY TENSION: ICD-10-CM

## 2024-08-28 DIAGNOSIS — F17.210 SMOKES WITH GREATER THAN 30 PACK YEAR HISTORY: ICD-10-CM

## 2024-08-28 PROBLEM — F33.9 DEPRESSION, RECURRENT: Status: RESOLVED | Noted: 2023-08-03 | Resolved: 2024-08-28

## 2024-08-28 PROBLEM — D62 ACUTE BLOOD LOSS ANEMIA: Status: RESOLVED | Noted: 2024-06-28 | Resolved: 2024-08-28

## 2024-08-28 PROCEDURE — 99214 OFFICE O/P EST MOD 30 MIN: CPT | Mod: S$GLB,,, | Performed by: NURSE PRACTITIONER

## 2024-08-28 PROCEDURE — 1159F MED LIST DOCD IN RCRD: CPT | Mod: CPTII,S$GLB,, | Performed by: NURSE PRACTITIONER

## 2024-08-28 PROCEDURE — 3079F DIAST BP 80-89 MM HG: CPT | Mod: CPTII,S$GLB,, | Performed by: NURSE PRACTITIONER

## 2024-08-28 PROCEDURE — 99999 PR PBB SHADOW E&M-EST. PATIENT-LVL IV: CPT | Mod: PBBFAC,,, | Performed by: NURSE PRACTITIONER

## 2024-08-28 PROCEDURE — 3077F SYST BP >= 140 MM HG: CPT | Mod: CPTII,S$GLB,, | Performed by: NURSE PRACTITIONER

## 2024-08-28 PROCEDURE — 3008F BODY MASS INDEX DOCD: CPT | Mod: CPTII,S$GLB,, | Performed by: NURSE PRACTITIONER

## 2024-08-28 RX ORDER — ZOLPIDEM TARTRATE 6.25 MG/1
6.25 TABLET, FILM COATED, EXTENDED RELEASE ORAL NIGHTLY PRN
Qty: 30 TABLET | Refills: 5 | Status: SHIPPED | OUTPATIENT
Start: 2024-08-28 | End: 2025-02-26

## 2024-08-28 RX ORDER — ONDANSETRON 4 MG/1
4 TABLET, ORALLY DISINTEGRATING ORAL EVERY 6 HOURS PRN
Qty: 20 TABLET | Refills: 2 | Status: SHIPPED | OUTPATIENT
Start: 2024-08-28

## 2024-08-28 RX ORDER — PANTOPRAZOLE SODIUM 40 MG/1
40 TABLET, DELAYED RELEASE ORAL 2 TIMES DAILY
Qty: 20 TABLET | Refills: 0 | Status: SHIPPED | OUTPATIENT
Start: 2024-08-28 | End: 2024-09-07

## 2024-08-28 RX ORDER — SERTRALINE HYDROCHLORIDE 50 MG/1
50 TABLET, FILM COATED ORAL DAILY
Qty: 30 TABLET | Refills: 3 | Status: SHIPPED | OUTPATIENT
Start: 2024-08-28 | End: 2025-08-28

## 2024-08-28 RX ORDER — BUPROPION HYDROCHLORIDE 150 MG/1
150 TABLET ORAL DAILY
Qty: 30 TABLET | Refills: 5 | Status: SHIPPED | OUTPATIENT
Start: 2024-08-28 | End: 2025-08-28

## 2024-08-28 SDOH — SOCIAL DETERMINANTS OF HEALTH (SDOH): OTHER SPECIFIED PROBLEMS RELATED TO PRIMARY SUPPORT GROUP: Z63.8

## 2024-08-28 NOTE — PROGRESS NOTES
SUBJECTIVE:      Patient ID: Jerod Barrientos is a 59 y.o. male.    Chief Complaint: Follow-up (1 month)    HPI  Is here for refills  Back hurts- is going to pain management  Xanax isn't working  Will try ambien- do not take within 4 hours or pain meds  Vss; bp is well controlled  Denies chest pain  Denies sob  Denies fever  Denies chills  Zoloft works somewhat  Would like something else that helps with energy  Protonix for gerd  Well butrin to try   Past Surgical History:   Procedure Laterality Date    COLONOSCOPY N/A 04/24/2023    Procedure: COLONOSCOPY;  Surgeon: Yunior Major MD;  Location: [facility];  Service: Endoscopy;  Laterality: N/A;  lm/ppm    ESOPHAGOGASTRODUODENOSCOPY  2012    ESOPHAGOGASTRODUODENOSCOPY N/A 7/6/2023    Procedure: EGD (ESOPHAGOGASTRODUODENOSCOPY);  Surgeon: Yunior Major MD;  Location: [facility];  Service: Endoscopy;  Laterality: N/A;    ESOPHAGOGASTRODUODENOSCOPY N/A 6/29/2024    Procedure: EGD (ESOPHAGOGASTRODUODENOSCOPY);  Surgeon: Kaitlyn Pruitt MD;  Location: [facility];  Service: Endoscopy;  Laterality: N/A;    ESOPHAGOGASTRODUODENOSCOPY N/A 7/4/2024    Procedure: EGD (ESOPHAGOGASTRODUODENOSCOPY);  Surgeon: Niles Butler Jr., MD;  Location: [facility];  Service: Endoscopy;  Laterality: N/A;    INJECTION OF ANESTHETIC AGENT AROUND MEDIAL BRANCH NERVES INNERVATING LUMBAR FACET JOINT Left 05/16/2023    Procedure: Block-nerve-medial branch-lumbar;  Surgeon: Syed Betancourt MD;  Location: [facility];  Service: Pain Management;  Laterality: Left;  L3,4,5    INTRALUMINAL GASTROINTESTINAL TRACT IMAGING VIA CAPSULE N/A 7/25/2024    Procedure: IMAGING PROCEDURE, GI TRACT, INTRALUMINAL, VIA CAPSULE;  Surgeon: Kaitlyn Pruitt MD;  Location: [facility];  Service: Endoscopy;  Laterality: N/A;    NECK SURGERY      SMALL BOWEL ENTEROSCOPY N/A 7/5/2024    Procedure: ENTEROSCOPY;  Surgeon: Kaitlyn Pruitt MD;  Location: [facility];  Service: Endoscopy;  Laterality:  N/A;     Family History   Problem Relation Name Age of Onset    Cancer Mother          lung cancer    Alcohol abuse Father      Cancer Maternal Grandfather          Throat cancer      Social History     Socioeconomic History    Marital status:     Number of children: 2   Occupational History    Occupation: Self Employed in [employer]   Tobacco Use    Smoking status: Former     Types: Cigarettes    Smokeless tobacco: Never   Substance and Sexual Activity    Alcohol use: Never    Drug use: Never    Sexual activity: Yes     Partners: Female   Social History Narrative        2 dogs living in home     Social Determinants of Health     Financial Resource Strain: Low Risk  (7/12/2024)    Received from [facility]    Overall Financial Resource Strain (CARDIA)     Difficulty of Paying Living Expenses: Not hard at all   Food Insecurity: No Food Insecurity (7/12/2024)    Received from [facility]    Hunger Vital Sign     Worried About Running Out of Food in the Last Year: Never true     Ran Out of Food in the Last Year: Never true   Transportation Needs: No Transportation Needs (7/12/2024)    Received from [facility]    PRAPARE - Transportation     Lack of Transportation (Medical): No     Lack of Transportation (Non-Medical): No   Physical Activity: Inactive (7/12/2024)    Received from [facility]    Exercise Vital Sign     Days of Exercise per Week: 0 days     Minutes of Exercise per Session: 0 min   Stress: No Stress Concern Present (7/12/2024)    Received from [facility]    Citizen of [place] of Occupational Health - Occupational Stress Questionnaire     Feeling of Stress : Only a little   Housing Stability: Low Risk  (7/12/2024)    Received from [facility]    Housing Stability Vital Sign     Unable to Pay for Housing in the Last Year: No      Number of Times Moved in the Last Year: 0     Homeless in the Last Year: No     Current Outpatient Medications   Medication Sig Dispense Refill    ALPRAZolam (XANAX) 0.5 MG tablet Take 1 tablet (0.5 mg total) by mouth nightly as needed for Anxiety. 30 tablet 1    multivitamin with minerals tablet Take 1 tablet by mouth.      oxyCODONE-acetaminophen (PERCOCET) 7.5-325 mg per tablet Take 1 tablet by mouth 3 (three) times daily.      buPROPion (WELLBUTRIN XL) 150 MG TB24 tablet Take 1 tablet (150 mg total) by mouth once daily. 30 tablet 5    ondansetron (ZOFRAN-ODT) 4 MG TbDL Take 1 tablet (4 mg total) by mouth every 6 (six) hours as needed (nausea). 20 tablet 2    pantoprazole (PROTONIX) 40 MG tablet Take 1 tablet (40 mg total) by mouth 2 (two) times daily. for 10 days 20 tablet 0    sertraline (ZOLOFT) 50 MG tablet Take 1 tablet (50 mg total) by mouth once daily. 30 tablet 3    zolpidem (AMBIEN CR) 6.25 MG CR tablet Take 1 tablet (6.25 mg total) by mouth nightly as needed for Insomnia (do not take within 4 hours of pain medication). 30 tablet 5     No current facility-administered medications for this visit.     Review of patient's allergies indicates:   Allergen Reactions    Opioids - morphine analogues Other (See Comments)     Shaking       Past Medical History:   Diagnosis Date    Hypertension     Sleep apnea      Past Surgical History:   Procedure Laterality Date    COLONOSCOPY N/A 04/24/2023    Procedure: COLONOSCOPY;  Surgeon: Yunior Major MD;  [facility];  Service: Endoscopy;  Laterality: N/A;  lm/ppm    ESOPHAGOGASTRODUODENOSCOPY  2012    ESOPHAGOGASTRODUODENOSCOPY N/A 7/6/2023    Procedure: EGD (ESOPHAGOGASTRODUODENOSCOPY);  Surgeon: Yunior Major MD;  Location: [facility];  Service: Endoscopy;  Laterality: N/A;    ESOPHAGOGASTRODUODENOSCOPY N/A 6/29/2024    Procedure: EGD (ESOPHAGOGASTRODUODENOSCOPY);  Surgeon: Kaitlyn Pruitt MD;  Location: [facility];  Service:  Endoscopy;  Laterality: N/A;    ESOPHAGOGASTRODUODENOSCOPY N/A 7/4/2024    Procedure: EGD (ESOPHAGOGASTRODUODENOSCOPY);  Surgeon: Niles Butler Jr., MD;  Location: [facility];  Service: Endoscopy;  Laterality: N/A;    INJECTION OF ANESTHETIC AGENT AROUND MEDIAL BRANCH NERVES INNERVATING LUMBAR FACET JOINT Left 05/16/2023    Procedure: Block-nerve-medial branch-lumbar;  Surgeon: Syed Betancourt MD;  [facility] OR;  Service: Pain Management;  Laterality: Left;  L3,4,5    INTRALUMINAL GASTROINTESTINAL TRACT IMAGING VIA CAPSULE N/A 7/25/2024    Procedure: IMAGING PROCEDURE, GI TRACT, INTRALUMINAL, VIA CAPSULE;  Surgeon: Kaitlyn Pruitt MD;  Location: [facility];  Service: Endoscopy;  Laterality: N/A;    NECK SURGERY      SMALL BOWEL ENTEROSCOPY N/A 7/5/2024    Procedure: ENTEROSCOPY;  Surgeon: Kaitlyn Pruitt MD;  Location: [facility];  Service: Endoscopy;  Laterality: N/A;       Review of Systems   All other systems reviewed and are negative.   OBJECTIVE:      Vitals:    08/28/24 0856   BP: (!) 140/80   BP Location: Left arm   Patient Position: Sitting   BP Method: Medium (Manual)   Pulse: 67   Resp: 18   Temp: 98.4 °F (36.9 °C)   SpO2: 97%   Weight: 90.8 kg (200 lb 1.1 oz)   Height: 6' (1.829 m)     Physical Exam  Vitals and nursing note reviewed.   Constitutional:       Appearance: Normal appearance. He is normal weight.   HENT:      Head: Normocephalic and atraumatic.   Eyes:      Pupils: Pupils are equal, round, and reactive to light.   Cardiovascular:      Rate and Rhythm: Normal rate and regular rhythm.      Pulses: Normal pulses.      Heart sounds: Normal heart sounds.   Pulmonary:      Effort: Pulmonary effort is normal.      Breath sounds: Normal breath sounds.   Musculoskeletal:      Cervical back: Normal range of motion.   Skin:     General: Skin is warm and dry.   Neurological:      General: No focal deficit present.      Mental Status: He is alert and oriented to person, place, and time.  Mental status is at baseline.   Psychiatric:         Mood and Affect: Mood normal.         Thought Content: Thought content normal.         Judgment: Judgment normal.      Comments: nonsuicidal      Assessment:       1. Screening PSA (prostate specific antigen)    2. Hypertension, essential    3. Iron deficiency anemia due to chronic blood loss    4. Chronic left-sided low back pain without sciatica    5. Smokes with greater than 30 pack year history    6. Impaired functional mobility and activity tolerance    7. Stress due to family tension    8. Insomnia, unspecified type    9. Gastroesophageal reflux disease, unspecified whether esophagitis present    10. BERNARD on CPAP        Plan:       Screening PSA (prostate specific antigen)  -     PSA, Screening; Future; Expected date: 09/11/2024    Hypertension, essential  -     Comprehensive Metabolic Panel; Future; Expected date: 09/11/2024  -     Lipid Panel; Future; Expected date: 08/28/2024    Iron deficiency anemia due to chronic blood loss  -     CBC Auto Differential; Future; Expected date: 09/11/2024    Chronic left-sided low back pain without sciatica    Smokes with greater than 30 pack year history    Impaired functional mobility and activity tolerance    Stress due to family tension  -     sertraline (ZOLOFT) 50 MG tablet; Take 1 tablet (50 mg total) by mouth once daily.  Dispense: 30 tablet; Refill: 3  -     buPROPion (WELLBUTRIN XL) 150 MG TB24 tablet; Take 1 tablet (150 mg total) by mouth once daily.  Dispense: 30 tablet; Refill: 5  -     ondansetron (ZOFRAN-ODT) 4 MG TbDL; Take 1 tablet (4 mg total) by mouth every 6 (six) hours as needed (nausea).  Dispense: 20 tablet; Refill: 2    Insomnia, unspecified type  -     zolpidem (AMBIEN CR) 6.25 MG CR tablet; Take 1 tablet (6.25 mg total) by mouth nightly as needed for Insomnia (do not take within 4 hours of pain medication).  Dispense: 30 tablet; Refill: 5    Gastroesophageal reflux disease, unspecified whether  esophagitis present  -     pantoprazole (PROTONIX) 40 MG tablet; Take 1 tablet (40 mg total) by mouth 2 (two) times daily. for 10 days  Dispense: 20 tablet; Refill: 0    BERNARD on CPAP  -     Ambulatory referral/consult to Pulmonology; Future; Expected date: 08/28/2024    Take medications as ordered  Get fasting labs in 6 months or sooner [facility]  Follow up in 3 to 6 months or sooner if needed  See pulmonogy  Do not take ambien xr within 4 hours of pain meds  Consider vaccines    No follow-ups on file.      8/28/2024 TOO Dior, FNP

## 2024-09-26 DIAGNOSIS — Z63.8 STRESS DUE TO FAMILY TENSION: ICD-10-CM

## 2024-09-26 SDOH — SOCIAL DETERMINANTS OF HEALTH (SDOH): OTHER SPECIFIED PROBLEMS RELATED TO PRIMARY SUPPORT GROUP: Z63.8

## 2024-10-09 RX ORDER — ALPRAZOLAM 0.5 MG/1
0.5 TABLET ORAL NIGHTLY PRN
Qty: 30 TABLET | Refills: 1 | OUTPATIENT
Start: 2024-10-09

## 2024-10-12 DIAGNOSIS — I10 ESSENTIAL HYPERTENSION: ICD-10-CM

## 2024-10-12 NOTE — TELEPHONE ENCOUNTER
No care due was identified.  [facility] Embedded Care Due Messages. Reference number: [id number].   10/12/2024 12:41:03 AM CDT

## 2024-10-13 RX ORDER — AMLODIPINE BESYLATE 10 MG/1
10 TABLET ORAL
Qty: 90 TABLET | Refills: 1 | Status: SHIPPED | OUTPATIENT
Start: 2024-10-13

## 2024-10-21 ENCOUNTER — LAB VISIT (OUTPATIENT)
Dept: LAB | Facility: HOSPITAL | Age: 59
End: 2024-10-21
Attending: INTERNAL MEDICINE
Payer: COMMERCIAL

## 2024-10-21 ENCOUNTER — PATIENT MESSAGE (OUTPATIENT)
Dept: ADMINISTRATIVE | Facility: HOSPITAL | Age: 59
End: 2024-10-21
Payer: COMMERCIAL

## 2024-10-21 DIAGNOSIS — D50.0 IRON DEFICIENCY ANEMIA DUE TO CHRONIC BLOOD LOSS: ICD-10-CM

## 2024-10-21 LAB
BASOPHILS # BLD AUTO: 0.04 K/UL (ref 0–0.2)
BASOPHILS NFR BLD: 0.7 % (ref 0–1.9)
DIFFERENTIAL METHOD BLD: ABNORMAL
EOSINOPHIL # BLD AUTO: 0.2 K/UL (ref 0–0.5)
EOSINOPHIL NFR BLD: 2.4 % (ref 0–8)
ERYTHROCYTE [DISTWIDTH] IN BLOOD BY AUTOMATED COUNT: 18 % (ref 11.5–14.5)
FERRITIN SERPL-MCNC: 92 NG/ML (ref 20–300)
HCT VFR BLD AUTO: 46.8 % (ref 40–54)
HGB BLD-MCNC: 15.2 G/DL (ref 14–18)
IMM GRANULOCYTES # BLD AUTO: 0.03 K/UL (ref 0–0.04)
IMM GRANULOCYTES NFR BLD AUTO: 0.5 % (ref 0–0.5)
IRON SERPL-MCNC: 71 UG/DL (ref 45–160)
LYMPHOCYTES # BLD AUTO: 1.5 K/UL (ref 1–4.8)
LYMPHOCYTES NFR BLD: 24.4 % (ref 18–48)
MCH RBC QN AUTO: 29 PG (ref 27–31)
MCHC RBC AUTO-ENTMCNC: 32.5 G/DL (ref 32–36)
MCV RBC AUTO: 89 FL (ref 82–98)
MONOCYTES # BLD AUTO: 0.6 K/UL (ref 0.3–1)
MONOCYTES NFR BLD: 9.4 % (ref 4–15)
NEUTROPHILS # BLD AUTO: 3.8 K/UL (ref 1.8–7.7)
NEUTROPHILS NFR BLD: 62.6 % (ref 38–73)
NRBC BLD-RTO: 0 /100 WBC
PLATELET # BLD AUTO: 204 K/UL (ref 150–450)
PMV BLD AUTO: 10.3 FL (ref 9.2–12.9)
RBC # BLD AUTO: 5.24 M/UL (ref 4.6–6.2)
SATURATED IRON: 19 % (ref 20–50)
TOTAL IRON BINDING CAPACITY: 377 UG/DL (ref 250–450)
TRANSFERRIN SERPL-MCNC: 255 MG/DL (ref 200–375)
WBC # BLD AUTO: 6.14 K/UL (ref 3.9–12.7)

## 2024-10-21 PROCEDURE — 85025 COMPLETE CBC W/AUTO DIFF WBC: CPT | Performed by: INTERNAL MEDICINE

## 2024-10-21 PROCEDURE — 84466 ASSAY OF TRANSFERRIN: CPT | Performed by: INTERNAL MEDICINE

## 2024-10-21 PROCEDURE — 83540 ASSAY OF IRON: CPT | Performed by: INTERNAL MEDICINE

## 2024-10-21 PROCEDURE — 82728 ASSAY OF FERRITIN: CPT | Performed by: INTERNAL MEDICINE

## 2024-10-21 PROCEDURE — 36415 COLL VENOUS BLD VENIPUNCTURE: CPT | Mod: PO | Performed by: INTERNAL MEDICINE

## 2024-10-22 ENCOUNTER — OFFICE VISIT (OUTPATIENT)
Dept: GASTROENTEROLOGY | Facility: CLINIC | Age: 59
End: 2024-10-22
Payer: COMMERCIAL

## 2024-10-22 VITALS
SYSTOLIC BLOOD PRESSURE: 131 MMHG | DIASTOLIC BLOOD PRESSURE: 78 MMHG | HEIGHT: 72 IN | HEART RATE: 62 BPM | WEIGHT: 201.06 LBS | BODY MASS INDEX: 27.23 KG/M2 | RESPIRATION RATE: 18 BRPM

## 2024-10-22 DIAGNOSIS — D50.0 IRON DEFICIENCY ANEMIA DUE TO CHRONIC BLOOD LOSS: Primary | ICD-10-CM

## 2024-10-22 PROCEDURE — 3008F BODY MASS INDEX DOCD: CPT | Mod: CPTII,S$GLB,, | Performed by: INTERNAL MEDICINE

## 2024-10-22 PROCEDURE — 99214 OFFICE O/P EST MOD 30 MIN: CPT | Mod: S$GLB,,, | Performed by: INTERNAL MEDICINE

## 2024-10-22 PROCEDURE — 99999 PR PBB SHADOW E&M-EST. PATIENT-LVL III: CPT | Mod: PBBFAC,,, | Performed by: INTERNAL MEDICINE

## 2024-10-22 PROCEDURE — 3078F DIAST BP <80 MM HG: CPT | Mod: CPTII,S$GLB,, | Performed by: INTERNAL MEDICINE

## 2024-10-22 PROCEDURE — 3075F SYST BP GE 130 - 139MM HG: CPT | Mod: CPTII,S$GLB,, | Performed by: INTERNAL MEDICINE

## 2024-10-22 NOTE — PROGRESS NOTES
[facility] Note    CC: Anemia, Melena    HPI 59 y.o. male presents for follow up of previous hopsitalization for melena and acute on chronic MEHDI. He underwent EGD (possible Dieulafoy lesion in stomach) in June 2024 , repeat EGD July 2024 with clean based duodenal ulcer and EGD with push enteroscopy in July 2024 was unremakable. His last colonoscopy was in April 2023 and was notable for internal hemorrhoids, left-sided diverticulosis and 4 small polyps (SSAs and hyperplastic). VCE 3 months ago notable for small ulcers of TI and tiny non bleeding angioectasias in small intestine. His fecal calprotectin was mildly elevated at 96 and his CRP is normal. He received IV iron and currently is without complaints, specifically denies diarrhea, recurrent melena or abdominal pain . He has a congenital ventral/umbilical hernia that he does not wish to be repaired.       Past Medical History:   Diagnosis Date    Hypertension     Sleep apnea        Allergies and Medications reviewed     Review of Systems  General ROS: negative for - chills, fever or weight loss  Cardiovascular ROS: no chest pain or dyspnea on exertion  Gastrointestinal ROS: no diarrhea, no recurrent melena, no hematemesis    Physical Examination  /78 (BP Location: Right arm)   Pulse 62   Resp 18   Ht 6' (1.829 m)   Wt 91.2 kg (201 lb 1 oz)   BMI 27.27 kg/m²   General appearance: alert, cooperative, no distress  HENT: Normocephalic, atraumatic, neck symmetrical, no nasal discharge, sclera anicteric   Lungs: clear to auscultation bilaterally, symmetric chest wall expansion bilaterally  Heart: regular rate and rhythm without rub; no displacement of the PMI   Abdomen: soft, nontender,nondistended, BS active  Extremities: extremities symmetric; no clubbing, cyanosis, or edema        Labs:  Lab Results   Component Value Date    WBC 6.14 10/21/2024    HGB 15.2 10/21/2024    HCT 46.8 10/21/2024    MCV 89 10/21/2024     10/21/2024      -Ferritin 92 (previously 15), Iron 71 (16)      Imaging:  CTA abdome/pelivsGI bleeding protocol July 2024 was independently visualized and reviewed by me and showed Active GI bleed within the descending limb of the duodenum.  No significant change.     Small cholelithiasis.     Diverticulosis.    Assessment:   59 y.o. male presents to follow up acute on chronic MEHDI associated with overt, occult GI bleeding several months ago (? Dieulafoy in duodenum , + CTA). His anemia has resolved after IV iron and he feels well. Small TI ulcers seen on VCE with mildly elevated fecal calprotecin of unclear significance.     Plan:  -Repeat CBC and iron counts in 3 months  -RTC 3 months    Kaitlyn Pruitt MD  [facility]  [address], [address]  [place] [address]  Office: [phone]  Fax: [phone]

## 2024-10-24 ENCOUNTER — OFFICE VISIT (OUTPATIENT)
Dept: PULMONOLOGY | Facility: CLINIC | Age: 59
End: 2024-10-24
Payer: COMMERCIAL

## 2024-10-24 VITALS
HEART RATE: 56 BPM | WEIGHT: 200 LBS | BODY MASS INDEX: 27.12 KG/M2 | OXYGEN SATURATION: 95 % | DIASTOLIC BLOOD PRESSURE: 80 MMHG | SYSTOLIC BLOOD PRESSURE: 150 MMHG

## 2024-10-24 DIAGNOSIS — G47.33 OSA ON CPAP: ICD-10-CM

## 2024-10-24 DIAGNOSIS — F17.210 SMOKES WITH GREATER THAN 30 PACK YEAR HISTORY: Primary | ICD-10-CM

## 2024-10-24 RX ORDER — TIZANIDINE 4 MG/1
4 TABLET ORAL 2 TIMES DAILY
COMMUNITY
Start: 2024-09-17

## 2024-10-24 NOTE — PROGRESS NOTES
New Office Visit/Consultation Note    Patient Name: Jerod Barrientos  MRN: [id number]  : 1965      Reason for visit: Sleep apnea    HPI:     10/24/2024 - Pt here for evaluation for sleep apnea - he had sleep study done about 2 years ago, home study with VASYL 5.5 and reported sat 81% (I reviewed and this data point may have been bad data).  He was RX CPAP (through Dr Mccracken's office, ? Auto-CPAP) and he tried to use but couldn't tolerate it despite multiple masks.  He quit using about 1 year ago.  His wife reports that he has periods when he stops breathing, he does smore some.  He states he has some sleep onset insomnia, that he generally wakes feeling refreshed and does not really have trouble with EDS.  We reviewed his HST and there were also some positional issues and hereports taht at home he mostly sleeps on his sides.  He denies any chronic lung disease.  He has had normal TSH in past.  He has a prior ho cigarette use    Past Medical History    Past Medical History:   Diagnosis Date    Hypertension     Sleep apnea        Past Surgical History    Past Surgical History:   Procedure Laterality Date    COLONOSCOPY N/A 2023    Procedure: COLONOSCOPY;  Surgeon: Yunior Major MD;  Location: [facility];  Service: Endoscopy;  Laterality: N/A;  lm/ppm    ESOPHAGOGASTRODUODENOSCOPY      ESOPHAGOGASTRODUODENOSCOPY N/A 2023    Procedure: EGD (ESOPHAGOGASTRODUODENOSCOPY);  Surgeon: Yunior Major MD;  Location: [facility];  Service: Endoscopy;  Laterality: N/A;    ESOPHAGOGASTRODUODENOSCOPY N/A 2024    Procedure: EGD (ESOPHAGOGASTRODUODENOSCOPY);  Surgeon: Kaitlyn Pruitt MD;  Location: [facility];  Service: Endoscopy;  Laterality: N/A;    ESOPHAGOGASTRODUODENOSCOPY N/A 2024    Procedure: EGD (ESOPHAGOGASTRODUODENOSCOPY);  Surgeon: Niles Butler Jr., MD;  Location: [facility];  Service: Endoscopy;  Laterality: N/A;    INJECTION OF ANESTHETIC AGENT AROUND MEDIAL BRANCH NERVES  INNERVATING LUMBAR FACET JOINT Left 05/16/2023    Procedure: Block-nerve-medial branch-lumbar;  Surgeon: Syed Betancourt MD;  Location: [facility];  Service: Pain Management;  Laterality: Left;  L3,4,5    INTRALUMINAL GASTROINTESTINAL TRACT IMAGING VIA CAPSULE N/A 7/25/2024    Procedure: IMAGING PROCEDURE, GI TRACT, INTRALUMINAL, VIA CAPSULE;  Surgeon: Kaitlyn Pruitt MD;  Location: [facility] ENDO;  Service: Endoscopy;  Laterality: N/A;    NECK SURGERY      SMALL BOWEL ENTEROSCOPY N/A 7/5/2024    Procedure: ENTEROSCOPY;  Surgeon: Kaitlyn Pruitt MD;  Location: [facility] ENDO;  Service: Endoscopy;  Laterality: N/A;       Medications      Current Outpatient Medications:     amLODIPine (NORVASC) 10 MG tablet, TAKE 1 TABLET BY MOUTH EVERY DAY, Disp: 90 tablet, Rfl: 1    buPROPion (WELLBUTRIN XL) 150 MG TB24 tablet, Take 1 tablet (150 mg total) by mouth once daily., Disp: 30 tablet, Rfl: 5    multivitamin with minerals tablet, Take 1 tablet by mouth., Disp: , Rfl:     ondansetron (ZOFRAN-ODT) 4 MG TbDL, Take 1 tablet (4 mg total) by mouth every 6 (six) hours as needed (nausea)., Disp: 20 tablet, Rfl: 2    oxyCODONE-acetaminophen (PERCOCET) 7.5-325 mg per tablet, Take 1 tablet by mouth 3 (three) times daily., Disp: , Rfl:     sertraline (ZOLOFT) 50 MG tablet, Take 1 tablet (50 mg total) by mouth once daily., Disp: 30 tablet, Rfl: 3    tiZANidine (ZANAFLEX) 4 MG tablet, Take 4 mg by mouth 2 (two) times daily., Disp: , Rfl:     zolpidem (AMBIEN CR) 6.25 MG CR tablet, Take 1 tablet (6.25 mg total) by mouth nightly as needed for Insomnia (do not take within 4 hours of pain medication)., Disp: 30 tablet, Rfl: 5    ALPRAZolam (XANAX) 0.5 MG tablet, Take 1 tablet (0.5 mg total) by mouth nightly as needed for Anxiety., Disp: 30 tablet, Rfl: 1    pantoprazole (PROTONIX) 40 MG tablet, Take 1 tablet (40 mg total) by mouth 2 (two) times daily. for 10 days, Disp: 20 tablet, Rfl: 0    Allergies    Review of patient's allergies indicates:    Allergen Reactions    Opioids - morphine analogues Other (See Comments)     Shaking        SocHx    Social History     Tobacco Use   Smoking Status Former    Types: Cigarettes   Smokeless Tobacco Never       Social History     Substance and Sexual Activity   Alcohol Use Never         FMHx    Family History   Problem Relation Name Age of Onset    Cancer Mother          lung cancer    Alcohol abuse Father      Cancer Maternal Grandfather          Throat cancer         Review of Systems  Review of Systems   Constitutional:  Positive for malaise/fatigue. Negative for chills, diaphoresis, fever and weight loss.        + snoring   HENT:  Negative for congestion.    Eyes:  Negative for pain.   Respiratory:  Negative for cough, hemoptysis, sputum production, shortness of breath, wheezing and stridor.    Cardiovascular:  Negative for chest pain, palpitations, orthopnea, claudication, leg swelling and PND.   Gastrointestinal:  Negative for abdominal pain, constipation, diarrhea, heartburn, nausea and vomiting.   Genitourinary:  Negative for dysuria, frequency and urgency.   Musculoskeletal:  Positive for back pain and joint pain. Negative for falls and myalgias.   Neurological:  Positive for weakness. Negative for sensory change and focal weakness.   Psychiatric/Behavioral:  Negative for depression. The patient is not nervous/anxious.        Physical Exam    Vitals:    10/24/24 0922   BP: (!) 150/80   BP Location: Left arm   Patient Position: Sitting   Pulse: (!) 56   SpO2: 95%   Weight: 90.7 kg (200 lb)       Physical Exam  Vitals and nursing note reviewed.   Constitutional:       General: He is not in acute distress.     Appearance: Normal appearance. He is well-developed. He is not ill-appearing, toxic-appearing or diaphoretic.   HENT:      Head: Normocephalic and atraumatic.      Right Ear: External ear normal.      Left Ear: External ear normal.      Nose: Congestion present.      Mouth/Throat:      Mouth: Mucous  membranes are moist.      Pharynx: Oropharynx is clear. No oropharyngeal exudate or posterior oropharyngeal erythema.      Comments: Mallampatti II  Eyes:      General: No scleral icterus.        Right eye: No discharge.         Left eye: No discharge.      Extraocular Movements: Extraocular movements intact.      Conjunctiva/sclera: Conjunctivae normal.      Pupils: Pupils are equal, round, and reactive to light.   Neck:      Thyroid: No thyromegaly.      Vascular: No JVD.      Trachea: No tracheal deviation.   Cardiovascular:      Rate and Rhythm: Normal rate and regular rhythm.      Heart sounds: Normal heart sounds. No murmur heard.     No friction rub. No gallop.   Pulmonary:      Effort: Pulmonary effort is normal. No respiratory distress.      Breath sounds: Normal breath sounds. No stridor. No wheezing, rhonchi or rales.   Chest:      Chest wall: No tenderness.   Abdominal:      General: Bowel sounds are normal. There is no distension.      Palpations: Abdomen is soft.      Tenderness: There is no abdominal tenderness. There is no guarding.   Musculoskeletal:         General: No tenderness. Normal range of motion.      Cervical back: Normal range of motion and neck supple. No rigidity.      Right lower leg: No edema.      Left lower leg: No edema.   Lymphadenopathy:      Cervical: No cervical adenopathy.   Skin:     General: Skin is warm.      Capillary Refill: Capillary refill takes less than 2 seconds.   Neurological:      General: No focal deficit present.      Mental Status: He is alert and oriented to person, place, and time. Mental status is at baseline.      Cranial Nerves: No cranial nerve deficit.      Motor: No weakness.      Gait: Gait normal.      Deep Tendon Reflexes: Reflexes are normal and symmetric.   Psychiatric:         Mood and Affect: Mood normal.         Behavior: Behavior normal.         Thought Content: Thought content normal.         Judgment: Judgment normal.         Labs    Lab  Results   Component Value Date    WBC 6.14 10/21/2024    HGB 15.2 10/21/2024    HCT 46.8 10/21/2024     10/21/2024       Sodium   Date Value Ref Range Status   07/03/2024 135 (L) 136 - 145 mmol/L Final     Potassium   Date Value Ref Range Status   07/03/2024 3.9 3.5 - 5.1 mmol/L Final     Chloride   Date Value Ref Range Status   07/03/2024 107 95 - 110 mmol/L Final     CO2   Date Value Ref Range Status   07/03/2024 24 23 - 29 mmol/L Final     Glucose   Date Value Ref Range Status   07/03/2024 95 70 - 110 mg/dL Final     BUN   Date Value Ref Range Status   07/03/2024 22 (H) 6 - 20 mg/dL Final     Creatinine   Date Value Ref Range Status   07/03/2024 0.7 0.5 - 1.4 mg/dL Final     Calcium   Date Value Ref Range Status   07/03/2024 8.2 (L) 8.7 - 10.5 mg/dL Final     Total Protein   Date Value Ref Range Status   07/03/2024 5.1 (L) 6.0 - 8.4 g/dL Final     Albumin   Date Value Ref Range Status   07/03/2024 3.0 (L) 3.5 - 5.2 g/dL Final     Total Bilirubin   Date Value Ref Range Status   07/03/2024 0.1 0.1 - 1.0 mg/dL Final     Comment:     For infants and newborns, interpretation of results should be based  on gestational age, weight and in agreement with clinical  observations.    Premature Infant recommended reference ranges:  Up to 24 hours.............<8.0 mg/dL  Up to 48 hours............<12.0 mg/dL  3-5 days..................<15.0 mg/dL  6-29 days.................<15.0 mg/dL       Alkaline Phosphatase   Date Value Ref Range Status   07/03/2024 62 55 - 135 U/L Final     AST   Date Value Ref Range Status   07/03/2024 12 10 - 40 U/L Final     ALT   Date Value Ref Range Status   07/03/2024 16 10 - 44 U/L Final     Anion Gap   Date Value Ref Range Status   07/03/2024 4 (L) 8 - 16 mmol/L Final       Xrays        Impression/Plan    Problem List Items Addressed This Visit          Other    BERNARD on CPAP     Not using CPAP for > 1 year  Will repeat HST and check on the severity of his BERNARD  We may be able to treat more  conservatively  RTC 1 month         Relevant Orders    Home Sleep Study    Smokes with greater than 30 pack year history - Primary     Will follow with pt further  He said he is no longer smoking            I have spent about 30 minutes with the patient taking the history and examining the patient.  We have discussed the diagnoses and current plan and all questions have been answered.  We have discussed the follow up plan.  The patient and family (if present) know to contact the office with any questions they may have.        Fredi Bond MD

## 2024-10-24 NOTE — ASSESSMENT & PLAN NOTE
Not using CPAP for > 1 year  Will repeat HST and check on the severity of his BERNARD  We may be able to treat more conservatively  RTC 1 month

## 2024-10-28 VITALS — SYSTOLIC BLOOD PRESSURE: 131 MMHG | DIASTOLIC BLOOD PRESSURE: 78 MMHG

## 2024-11-12 ENCOUNTER — PATIENT MESSAGE (OUTPATIENT)
Dept: ADMINISTRATIVE | Facility: HOSPITAL | Age: 59
End: 2024-11-12
Payer: COMMERCIAL

## 2025-02-08 DIAGNOSIS — Z63.8 STRESS DUE TO FAMILY TENSION: ICD-10-CM

## 2025-02-08 RX ORDER — BUPROPION HYDROCHLORIDE 150 MG/1
TABLET ORAL
Qty: 30 TABLET | Refills: 2 | Status: SHIPPED | OUTPATIENT
Start: 2025-02-08

## 2025-02-08 SDOH — SOCIAL DETERMINANTS OF HEALTH (SDOH): OTHER SPECIFIED PROBLEMS RELATED TO PRIMARY SUPPORT GROUP: Z63.8

## 2025-02-12 ENCOUNTER — OFFICE VISIT (OUTPATIENT)
Dept: GASTROENTEROLOGY | Facility: CLINIC | Age: 60
End: 2025-02-12
Payer: COMMERCIAL

## 2025-02-12 VITALS — WEIGHT: 200.81 LBS | BODY MASS INDEX: 27.2 KG/M2 | HEIGHT: 72 IN | RESPIRATION RATE: 18 BRPM

## 2025-02-12 DIAGNOSIS — D50.0 IRON DEFICIENCY ANEMIA DUE TO CHRONIC BLOOD LOSS: Primary | ICD-10-CM

## 2025-02-12 DIAGNOSIS — Z87.19 HISTORY OF GI BLEED: ICD-10-CM

## 2025-02-12 PROCEDURE — 3008F BODY MASS INDEX DOCD: CPT | Mod: CPTII,S$GLB,, | Performed by: INTERNAL MEDICINE

## 2025-02-12 PROCEDURE — 99213 OFFICE O/P EST LOW 20 MIN: CPT | Mod: S$GLB,,, | Performed by: INTERNAL MEDICINE

## 2025-02-12 PROCEDURE — 99999 PR PBB SHADOW E&M-EST. PATIENT-LVL III: CPT | Mod: PBBFAC,,, | Performed by: INTERNAL MEDICINE

## 2025-02-12 NOTE — PROGRESS NOTES
[facility] Note    CC: Anemia, Melena    HPI 59 y.o. male  presents for follow up of previous hopsitalization for melena and acute on chronic MEHDI. His anemia resolved with IV iron and he feels very well. He does not have diarrhea or abdominal pain.    Previous History:  He had a positive CTA in the duodenum in June 2024 , subsequent EGD (possible Dieulafoy lesion in stomach) , repeat EGD July 2024 with clean based duodenal ulcer and EGD with push enteroscopy in July 2024 was unremarkable.  His last colonoscopy was in April 2023 and was notable for internal hemorrhoids, left-sided diverticulosis and 4 small polyps (SSAs and hyperplastic). VCE in July 2024 notable for small ulcers of TI and tiny non bleeding angioectasias in small intestine. His fecal calprotectin was mildly elevated at 96 and his CRP is normal.        Past Medical History:   Diagnosis Date    Hypertension     Sleep apnea        Allergies and Medications reviewed     Review of Systems  General ROS: negative for - chills, fever or weight loss  Cardiovascular ROS: no chest pain or dyspnea on exertion  Gastrointestinal ROS: no abdominal pain, no diarrhea, no hematemesis     Physical Examination  Resp 18   Ht 6' (1.829 m)   Wt 91.1 kg (200 lb 13.4 oz)   BMI 27.24 kg/m²   General appearance: alert, cooperative, no distress  HENT: Normocephalic, atraumatic, neck symmetrical, no nasal discharge, sclera anicteric   Extremities: extremities symmetric; no clubbing, cyanosis, or edema        Labs:  Lab Results   Component Value Date    WBC 6.14 10/21/2024    HGB 15.2 10/21/2024    HCT 46.8 10/21/2024    MCV 89 10/21/2024     10/21/2024         Assessment:   59 y.o. male presents to follow up acute on chronic MEHDI associated with overt, occult GI bleeding in 2024 (? Dieulafoy in duodenum , + CTA). His anemia has resolved after IV iron and he feels well. Small TI ulcers seen on VCE with mildly elevated fecal calprotecin of unclear  significance, he does not have diarrhea or abdominal pain.     Plan:  -Check labs (CBC, Iron, Ferritin), if stable RTC PRN  -Repeat colonoscopy 2026     Kaitlyn Pruitt MD  [facility]  [address], [address]  [place] [address]  Office: [phone]  Fax: [phone]

## 2025-02-18 ENCOUNTER — OFFICE VISIT (OUTPATIENT)
Dept: FAMILY MEDICINE | Facility: CLINIC | Age: 60
End: 2025-02-18
Payer: COMMERCIAL

## 2025-02-18 DIAGNOSIS — F41.9 ANXIETY: ICD-10-CM

## 2025-02-18 DIAGNOSIS — I10 HYPERTENSION, ESSENTIAL: ICD-10-CM

## 2025-02-18 DIAGNOSIS — F33.9 DEPRESSION, RECURRENT: ICD-10-CM

## 2025-02-18 DIAGNOSIS — F32.1 MAJOR DEPRESSIVE DISORDER, SINGLE EPISODE, MODERATE: Primary | ICD-10-CM

## 2025-02-18 DIAGNOSIS — Z12.5 SCREENING PSA (PROSTATE SPECIFIC ANTIGEN): ICD-10-CM

## 2025-02-18 PROCEDURE — 3074F SYST BP LT 130 MM HG: CPT | Mod: CPTII,S$GLB,, | Performed by: NURSE PRACTITIONER

## 2025-02-18 PROCEDURE — 3008F BODY MASS INDEX DOCD: CPT | Mod: CPTII,S$GLB,, | Performed by: NURSE PRACTITIONER

## 2025-02-18 PROCEDURE — 3078F DIAST BP <80 MM HG: CPT | Mod: CPTII,S$GLB,, | Performed by: NURSE PRACTITIONER

## 2025-02-18 PROCEDURE — 1159F MED LIST DOCD IN RCRD: CPT | Mod: CPTII,S$GLB,, | Performed by: NURSE PRACTITIONER

## 2025-02-18 PROCEDURE — 99999 PR PBB SHADOW E&M-EST. PATIENT-LVL IV: CPT | Mod: PBBFAC,,, | Performed by: NURSE PRACTITIONER

## 2025-02-18 PROCEDURE — 99214 OFFICE O/P EST MOD 30 MIN: CPT | Mod: S$GLB,,, | Performed by: NURSE PRACTITIONER

## 2025-02-18 RX ORDER — VENLAFAXINE HYDROCHLORIDE 75 MG/1
75 CAPSULE, EXTENDED RELEASE ORAL DAILY
Qty: 30 CAPSULE | Refills: 11 | Status: SHIPPED | OUTPATIENT
Start: 2025-02-18 | End: 2025-02-27

## 2025-02-18 RX ORDER — CYCLOBENZAPRINE HCL 5 MG
5 TABLET ORAL
COMMUNITY
Start: 2025-02-04

## 2025-02-18 NOTE — PROGRESS NOTES
Subjective:       Patient ID: Jerod Penaloza is a 59 y.o. male.    Chief Complaint: Depression    HPI    History of Present Illness    CHIEF COMPLAINT:  Mr. Penaloza presents today for follow up on depression medication.    DEPRESSION:  He reports current Bupropion 150mg is not effective. He experiences low motivation, difficulty concentrating, and extremely low energy levels. He has numerous unfinished projects due to inability to focus. He reports occasional sleep issues. He denies feeling sad but instead feels annoyed, and denies suicidal thoughts.    PSYCHOSOCIAL STRESSORS:  He experiences daily conflicts with his wife and [place], feeling criticized for his actions and statements. 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 He regularly provides childcare for his granddaughter while family members work, limiting his ability to engage in outside relationships or activities.    MENTAL HEALTH TREATMENT:  He is currently attending psychological therapy. Previous depression medications include Zoloft many years ago and Lexapro (escitalopram) which was discontinued in July.    OTHER MEDICATIONS:  He takes Lisinopril for blood pressure management.      ROS:  General: -fever, -chills, +fatigue, -weight gain, -weight loss  Eyes: -vision changes, -redness, -discharge  ENT: -ear pain, -nasal congestion, -sore throat  Cardiovascular: -chest pain, -palpitations, -lower extremity edema  Respiratory: -cough, -shortness of breath  Gastrointestinal: -abdominal pain, -nausea, -vomiting, -diarrhea, -constipation, -blood in stool  Genitourinary: -dysuria, -hematuria, -frequency  Musculoskeletal: -joint pain, -muscle pain  Skin: -rash, -lesion  Neurological: -headache, -dizziness, -numbness, -tingling  Psychiatric: +anxiety, +depression, +sleep difficulty, -suicidal ideation, -homicidal ideatioin        Review of  patient's allergies indicates:   Allergen Reactions    Opioids - morphine analogues Other (See Comments)     Shaking      Social Drivers of Health     Tobacco Use: Medium Risk (2/18/2025)    Patient History     Smoking Tobacco Use: Former     Smokeless Tobacco Use: Never     Passive Exposure: Not on file   Alcohol Use: Not At Risk (2/12/2025)    AUDIT-C     Frequency of Alcohol Consumption: Never     Average Number of Drinks: Patient does not drink     Frequency of Binge Drinking: Never   Financial Resource Strain: Low Risk  (2/12/2025)    Overall Financial Resource Strain (CARDIA)     Difficulty of Paying Living Expenses: Not very hard   Food Insecurity: Food Insecurity Present (2/12/2025)    Hunger Vital Sign     Worried About Running Out of Food in the Last Year: Sometimes true     Ran Out of Food in the Last Year: Never true   Transportation Needs: No Transportation Needs (2/12/2025)    PRAPARE - Transportation     Lack of Transportation (Medical): No     Lack of Transportation (Non-Medical): No   Physical Activity: Inactive (2/12/2025)    Exercise Vital Sign     Days of Exercise per Week: 0 days     Minutes of Exercise per Session: 150+ min   Stress: No Stress Concern Present (2/12/2025)    [employer] - Occupational Stress Questionnaire     Feeling of Stress : Only a little   Housing Stability: Low Risk  (2/12/2025)    Housing Stability Vital Sign     Unable to Pay for Housing in the Last Year: No     Number of Times Moved in the Last Year: 0     Homeless in the Last Year: No   Depression: High Risk (2/18/2025)    Depression     Last PHQ-4: Flowsheet Data: 19   Utilities: Not At Risk (2/12/2025)    [facility] Utilities     Threatened with loss of utilities: No   Health Literacy: Adequate Health Literacy (2/12/2025)     Health Literacy     Frequency of need for help with medical instructions: Rarely   Social Isolation: Not on file      Past Medical History:   Diagnosis Date     Hypertension     Sleep apnea       Past Surgical History:   Procedure Laterality Date    COLONOSCOPY N/A 04/24/2023    Procedure: COLONOSCOPY;  Surgeon: Yunior Major MD;  Location: [facility];  Service: Endoscopy;  Laterality: N/A;  lm/ppm    ESOPHAGOGASTRODUODENOSCOPY  2012    ESOPHAGOGASTRODUODENOSCOPY N/A 7/6/2023    Procedure: EGD (ESOPHAGOGASTRODUODENOSCOPY);  Surgeon: Yunior Major MD;  Location: [facility];  Service: Endoscopy;  Laterality: N/A;    ESOPHAGOGASTRODUODENOSCOPY N/A 6/29/2024    Procedure: EGD (ESOPHAGOGASTRODUODENOSCOPY);  Surgeon: Kaitlyn Pruitt MD;  Location: [facility];  Service: Endoscopy;  Laterality: N/A;    ESOPHAGOGASTRODUODENOSCOPY N/A 7/4/2024    Procedure: EGD (ESOPHAGOGASTRODUODENOSCOPY);  Surgeon: Niles Butler Jr., MD;  Location: [facility];  Service: Endoscopy;  Laterality: N/A;    INJECTION OF ANESTHETIC AGENT AROUND MEDIAL BRANCH NERVES INNERVATING LUMBAR FACET JOINT Left 05/16/2023    Procedure: Block-nerve-medial branch-lumbar;  Surgeon: Syed Betancourt MD;  Location: [facility] OR;  Service: Pain Management;  Laterality: Left;  L3,4,5    INTRALUMINAL GASTROINTESTINAL TRACT IMAGING VIA CAPSULE N/A 7/25/2024    Procedure: IMAGING PROCEDURE, GI TRACT, INTRALUMINAL, VIA CAPSULE;  Surgeon: Kaitlyn Pruitt MD;  Location: [facility];  Service: Endoscopy;  Laterality: N/A;    NECK SURGERY      SMALL BOWEL ENTEROSCOPY N/A 7/5/2024    Procedure: ENTEROSCOPY;  Surgeon: Kaitlyn Pruitt MD;  Location: [facility];  Service: Endoscopy;  Laterality: N/A;      Social History[1]    Current Medications[2]        Objective:      Vitals:    02/18/25 1523 02/18/25 1600   BP: (!) 140/74 (!) 142/74   BP Location: Left arm    Patient Position: Sitting Sitting   Pulse: 72    SpO2: 98%    Weight: 92.4 kg (203 lb 11.2 oz)    Height: 6' (1.829 m)       Physical Exam  Constitutional:       Appearance: Normal appearance.   HENT:      Head: Normocephalic and atraumatic.   Eyes:       Conjunctiva/sclera: Conjunctivae normal.   Cardiovascular:      Rate and Rhythm: Normal rate and regular rhythm.      Heart sounds: Normal heart sounds.   Pulmonary:      Effort: Pulmonary effort is normal. No respiratory distress.      Breath sounds: Normal breath sounds. No wheezing.   Abdominal:      General: There is no distension.      Palpations: There is no mass.      Tenderness: There is no abdominal tenderness.   Musculoskeletal:      Right lower leg: No edema.      Left lower leg: No edema.   Skin:     General: Skin is warm.   Neurological:      Mental Status: He is alert and oriented to person, place, and time.   Psychiatric:         Attention and Perception: Attention and perception normal.         Mood and Affect: Mood is anxious.         Speech: Speech normal.         Behavior: Behavior normal.         Thought Content: Thought content normal.         Cognition and Memory: Cognition normal.         Judgment: Judgment normal.         Assessment:       1. Screening PSA (prostate specific antigen)    2. Hypertension, essential    3. Anxiety    4. Depression, recurrent        Plan:       Jerod was seen today for depression.    Diagnoses and all orders for this visit:    Screening PSA (prostate specific antigen)  -     PSA, SCREENING; Future    Hypertension, essential  -     LIPID PANEL; Future  -     Comprehensive Metabolic Panel; Future    Anxiety  -     venlafaxine (EFFEXOR-XR) 75 MG 24 hr capsule; Take 1 capsule (75 mg total) by mouth once daily.    Depression, recurrent  -     venlafaxine (EFFEXOR-XR) 75 MG 24 hr capsule; Take 1 capsule (75 mg total) by mouth once daily.     Encouraged pt to continue to see therapist. Encouraged him to seek friendships and hobbies outside of the home in order to get a break from stressors at home.       F/u 1 month  Patient verbalized understanding and agrees with plan.         [1]   Social History  Socioeconomic History    Marital status:     Number of children:  2   [2]   Current Outpatient Medications:     ALPRAZolam (XANAX) 0.5 MG tablet, Take 1 tablet (0.5 mg total) by mouth nightly as needed for Anxiety., Disp: 30 tablet, Rfl: 1    amLODIPine (NORVASC) 10 MG tablet, TAKE 1 TABLET BY MOUTH EVERY DAY, Disp: 90 tablet, Rfl: 1    cyclobenzaprine (FLEXERIL) 5 MG tablet, Take 5 mg by mouth., Disp: , Rfl:     multivitamin with minerals tablet, Take 1 tablet by mouth., Disp: , Rfl:     ondansetron (ZOFRAN-ODT) 4 MG TbDL, Take 1 tablet (4 mg total) by mouth every 6 (six) hours as needed (nausea)., Disp: 20 tablet, Rfl: 2    oxyCODONE-acetaminophen (PERCOCET) 7.5-325 mg per tablet, Take 1 tablet by mouth 3 (three) times daily., Disp: , Rfl:     tiZANidine (ZANAFLEX) 4 MG tablet, Take 4 mg by mouth 2 (two) times daily., Disp: , Rfl:     zolpidem (AMBIEN CR) 6.25 MG CR tablet, Take 1 tablet (6.25 mg total) by mouth nightly as needed for Insomnia (do not take within 4 hours of pain medication)., Disp: 30 tablet, Rfl: 5    pantoprazole (PROTONIX) 40 MG tablet, Take 1 tablet (40 mg total) by mouth 2 (two) times daily. for 10 days, Disp: 20 tablet, Rfl: 0    venlafaxine (EFFEXOR-XR) 75 MG 24 hr capsule, Take 1 capsule (75 mg total) by mouth once daily., Disp: 30 capsule, Rfl: 11

## 2025-02-22 VITALS
WEIGHT: 203.69 LBS | BODY MASS INDEX: 27.59 KG/M2 | OXYGEN SATURATION: 98 % | DIASTOLIC BLOOD PRESSURE: 74 MMHG | HEIGHT: 72 IN | SYSTOLIC BLOOD PRESSURE: 142 MMHG | HEART RATE: 72 BPM

## 2025-02-22 PROBLEM — F32.1 MAJOR DEPRESSIVE DISORDER, SINGLE EPISODE, MODERATE: Status: ACTIVE | Noted: 2025-02-22

## 2025-02-27 DIAGNOSIS — Z63.8 STRESS DUE TO FAMILY TENSION: ICD-10-CM

## 2025-02-27 RX ORDER — SERTRALINE HYDROCHLORIDE 50 MG/1
TABLET, FILM COATED ORAL
Qty: 30 TABLET | Refills: 3 | Status: SHIPPED | OUTPATIENT
Start: 2025-02-27

## 2025-02-27 SDOH — SOCIAL DETERMINANTS OF HEALTH (SDOH): OTHER SPECIFIED PROBLEMS RELATED TO PRIMARY SUPPORT GROUP: Z63.8

## 2025-03-12 ENCOUNTER — RESULTS FOLLOW-UP (OUTPATIENT)
Dept: FAMILY MEDICINE | Facility: CLINIC | Age: 60
End: 2025-03-12
Payer: COMMERCIAL

## 2025-03-12 ENCOUNTER — LAB VISIT (OUTPATIENT)
Dept: LAB | Facility: HOSPITAL | Age: 60
End: 2025-03-12
Attending: NURSE PRACTITIONER
Payer: COMMERCIAL

## 2025-03-12 DIAGNOSIS — Z12.5 SCREENING PSA (PROSTATE SPECIFIC ANTIGEN): ICD-10-CM

## 2025-03-12 DIAGNOSIS — I10 HYPERTENSION, ESSENTIAL: ICD-10-CM

## 2025-03-12 DIAGNOSIS — E78.5 HYPERLIPIDEMIA, UNSPECIFIED HYPERLIPIDEMIA TYPE: Primary | ICD-10-CM

## 2025-03-12 LAB
ALBUMIN SERPL BCP-MCNC: 3.8 G/DL (ref 3.5–5.2)
ALP SERPL-CCNC: 106 U/L (ref 40–150)
ALT SERPL W/O P-5'-P-CCNC: 18 U/L (ref 10–44)
ANION GAP SERPL CALC-SCNC: 8 MMOL/L (ref 8–16)
AST SERPL-CCNC: 17 U/L (ref 10–40)
BILIRUB SERPL-MCNC: 0.5 MG/DL (ref 0.1–1)
BUN SERPL-MCNC: 10 MG/DL (ref 6–20)
CALCIUM SERPL-MCNC: 9.2 MG/DL (ref 8.7–10.5)
CHLORIDE SERPL-SCNC: 105 MMOL/L (ref 95–110)
CHOLEST SERPL-MCNC: 211 MG/DL (ref 120–199)
CHOLEST/HDLC SERPL: 6.2 {RATIO} (ref 2–5)
CO2 SERPL-SCNC: 25 MMOL/L (ref 23–29)
COMPLEXED PSA SERPL-MCNC: 0.29 NG/ML (ref 0–4)
CREAT SERPL-MCNC: 0.8 MG/DL (ref 0.5–1.4)
EST. GFR  (NO RACE VARIABLE): >60 ML/MIN/1.73 M^2
GLUCOSE SERPL-MCNC: 112 MG/DL (ref 70–110)
HDLC SERPL-MCNC: 34 MG/DL (ref 40–75)
HDLC SERPL: 16.1 % (ref 20–50)
LDLC SERPL CALC-MCNC: 157.8 MG/DL (ref 63–159)
NONHDLC SERPL-MCNC: 177 MG/DL
POTASSIUM SERPL-SCNC: 4.1 MMOL/L (ref 3.5–5.1)
PROT SERPL-MCNC: 7 G/DL (ref 6–8.4)
SODIUM SERPL-SCNC: 138 MMOL/L (ref 136–145)
TRIGL SERPL-MCNC: 96 MG/DL (ref 30–150)

## 2025-03-12 PROCEDURE — 84153 ASSAY OF PSA TOTAL: CPT | Performed by: NURSE PRACTITIONER

## 2025-03-12 PROCEDURE — 80053 COMPREHEN METABOLIC PANEL: CPT | Performed by: NURSE PRACTITIONER

## 2025-03-12 PROCEDURE — 80061 LIPID PANEL: CPT | Performed by: NURSE PRACTITIONER

## 2025-03-12 PROCEDURE — 36415 COLL VENOUS BLD VENIPUNCTURE: CPT | Mod: PO | Performed by: NURSE PRACTITIONER

## 2025-03-12 RX ORDER — ATORVASTATIN CALCIUM 20 MG/1
20 TABLET, FILM COATED ORAL DAILY
Qty: 90 TABLET | Refills: 3 | Status: SHIPPED | OUTPATIENT
Start: 2025-03-12 | End: 2026-03-12

## 2025-03-12 NOTE — PROGRESS NOTES
Please call patient and inform.  Your cholesterol is elevated.  We should start a medication to bring this down. Will send this to your pharmacy, need to repeat test in 3 months. Remember to exercise at least 3 times a week. Choose beverages that have zero sugar, reduce simple carbs such as rice, pasta and bread. Reduce fried and fatty foods. Increase intake of fruits and vegetables.

## 2025-03-18 ENCOUNTER — OFFICE VISIT (OUTPATIENT)
Dept: FAMILY MEDICINE | Facility: CLINIC | Age: 60
End: 2025-03-18
Payer: COMMERCIAL

## 2025-03-18 VITALS
BODY MASS INDEX: 26.85 KG/M2 | TEMPERATURE: 99 F | HEART RATE: 73 BPM | OXYGEN SATURATION: 96 % | SYSTOLIC BLOOD PRESSURE: 142 MMHG | DIASTOLIC BLOOD PRESSURE: 80 MMHG | WEIGHT: 198 LBS

## 2025-03-18 DIAGNOSIS — F41.9 ANXIETY: ICD-10-CM

## 2025-03-18 DIAGNOSIS — R25.2 LEG CRAMPING: ICD-10-CM

## 2025-03-18 DIAGNOSIS — G47.00 INSOMNIA, UNSPECIFIED TYPE: ICD-10-CM

## 2025-03-18 DIAGNOSIS — F32.1 MAJOR DEPRESSIVE DISORDER, SINGLE EPISODE, MODERATE: Primary | ICD-10-CM

## 2025-03-18 DIAGNOSIS — Z13.31 POSITIVE DEPRESSION SCREENING: ICD-10-CM

## 2025-03-18 PROCEDURE — 3077F SYST BP >= 140 MM HG: CPT | Mod: CPTII,S$GLB,, | Performed by: NURSE PRACTITIONER

## 2025-03-18 PROCEDURE — 99214 OFFICE O/P EST MOD 30 MIN: CPT | Mod: S$GLB,,, | Performed by: NURSE PRACTITIONER

## 2025-03-18 PROCEDURE — 1159F MED LIST DOCD IN RCRD: CPT | Mod: CPTII,S$GLB,, | Performed by: NURSE PRACTITIONER

## 2025-03-18 PROCEDURE — 99999 PR PBB SHADOW E&M-EST. PATIENT-LVL III: CPT | Mod: PBBFAC,,, | Performed by: NURSE PRACTITIONER

## 2025-03-18 PROCEDURE — 3079F DIAST BP 80-89 MM HG: CPT | Mod: CPTII,S$GLB,, | Performed by: NURSE PRACTITIONER

## 2025-03-18 PROCEDURE — 3008F BODY MASS INDEX DOCD: CPT | Mod: CPTII,S$GLB,, | Performed by: NURSE PRACTITIONER

## 2025-03-18 RX ORDER — GABAPENTIN 100 MG/1
100 CAPSULE ORAL 3 TIMES DAILY
Qty: 90 CAPSULE | Refills: 11 | Status: SHIPPED | OUTPATIENT
Start: 2025-03-18 | End: 2026-03-18

## 2025-03-18 RX ORDER — ZOLPIDEM TARTRATE 5 MG/1
5 TABLET ORAL NIGHTLY
Qty: 30 TABLET | Refills: 2 | Status: SHIPPED | OUTPATIENT
Start: 2025-03-18

## 2025-03-18 RX ORDER — VENLAFAXINE HYDROCHLORIDE 150 MG/1
150 CAPSULE, EXTENDED RELEASE ORAL DAILY
Qty: 90 CAPSULE | Refills: 3 | Status: SHIPPED | OUTPATIENT
Start: 2025-03-18 | End: 2026-03-18

## 2025-03-18 NOTE — PROGRESS NOTES
Subjective:       Patient ID: Jerod Penaloza is a 60 y.o. male.    Chief Complaint: Depression and Back Pain    Depression  Back Pain        History of Present Illness    CHIEF COMPLAINT:  Mr. Penaloza presents today for follow up of  depression. Has been taking effexor as prescribed. Notes an improvement in mood but still having some issues with anxiety and depression. He also states that his chronic muscle cramps/spasms have increased in frequency since starting effexor.     MUSCULOSKELETAL:  He experiences muscle spasms in hands, toes, and between shoulders. His hand gets stuck when lifting drinks, requiring manual intervention to release. He experiences yvonne horses in legs at night, with one severe episode causing bruising from leg to ankle. Multiple treatments including ice packs, Epsom salt baths, massages, and a massage chair pad have been unsuccessful. Previous muscle relaxers caused headaches. He has had back pain for 4 years. CT of the lower back was ordered by back surgeon who was considering SI joint screw placement. He reports pain in side joints that varies with amount of walking.    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 He experiences anxiety with heightened physiological response in certain situations, particularly during encounters with law enforcement. He denies severe panic symptoms such as crying, hyperventilating, or near-syncope during these anxiety episodes.    CURRENT MEDICATIONS:  He takes Amlodipine for blood pressure, Atorvastatin for cholesterol, and Effexor for depression and anxiety.    Portions of this note were generated with the assistance of ambient listening technology.      ROS:  General: -fever, -chills, +fatigue, -weight gain, -weight loss, +loss of energy  Eyes: -vision changes, -redness, -discharge  ENT: -ear pain, -nasal congestion, -sore throat  Cardiovascular: -chest  pain, -palpitations, -lower extremity edema  Respiratory: -cough, -shortness of breath  Gastrointestinal: -abdominal pain, -nausea, -vomiting, -diarrhea, -constipation, -blood in stool  Genitourinary: -dysuria, -hematuria, -frequency, +excessive urination  Musculoskeletal: +joint pain, -muscle pain, +muscle spasms, +back pain, +restless legs  Skin: -rash, -lesion  Neurological: +headache, -dizziness, -numbness, -tingling, +lack of focus/concentration, +migraines  Psychiatric: +anxiety, -depression, -sleep difficulty, +lack of interest, +paranoia, -suicidal ideation         Review of patient's allergies indicates:   Allergen Reactions    Opioids - morphine analogues Other (See Comments)     Shaking      Social Drivers of Health     Tobacco Use: Medium Risk (3/18/2025)    Patient History     Smoking Tobacco Use: Former     Smokeless Tobacco Use: Never     Passive Exposure: Not on file   Alcohol Use: Not At Risk (2/12/2025)    AUDIT-C     Frequency of Alcohol Consumption: Never     Average Number of Drinks: Patient does not drink     Frequency of Binge Drinking: Never   Financial Resource Strain: Low Risk  (2/12/2025)    Overall Financial Resource Strain (CARDIA)     Difficulty of Paying Living Expenses: Not very hard   Food Insecurity: Food Insecurity Present (2/12/2025)    Hunger Vital Sign     Worried About Running Out of Food in the Last Year: Sometimes true     Ran Out of Food in the Last Year: Never true   Transportation Needs: No Transportation Needs (2/12/2025)    PRAPARE - Transportation     Lack of Transportation (Medical): No     Lack of Transportation (Non-Medical): No   Physical Activity: Inactive (2/12/2025)    Exercise Vital Sign     Days of Exercise per Week: 0 days     Minutes of Exercise per Session: 150+ min   Stress: No Stress Concern Present (2/12/2025)    [employer] - Occupational Stress Questionnaire     Feeling of Stress : Only a little   Housing Stability: Low Risk   (2/12/2025)    Housing Stability Vital Sign     Unable to Pay for Housing in the Last Year: No     Number of Times Moved in the Last Year: 0     Homeless in the Last Year: No   Depression: High Risk (3/18/2025)    Depression     Last PHQ-4: Flowsheet Data: 21   Utilities: Not At Risk (2/12/2025)    [facility] Utilities     Threatened with loss of utilities: No   Health Literacy: Adequate Health Literacy (2/12/2025)     Health Literacy     Frequency of need for help with medical instructions: Rarely   Social Isolation: Not on file      Past Medical History:   Diagnosis Date    Hypertension     Sleep apnea       Past Surgical History:   Procedure Laterality Date    COLONOSCOPY N/A 04/24/2023    Procedure: COLONOSCOPY;  Surgeon: Yunior Major MD;  Location: [facility];  Service: Endoscopy;  Laterality: N/A;  lm/ppm    ESOPHAGOGASTRODUODENOSCOPY  2012    ESOPHAGOGASTRODUODENOSCOPY N/A 7/6/2023    Procedure: EGD (ESOPHAGOGASTRODUODENOSCOPY);  Surgeon: Yunior Major MD;  Location: [facility];  Service: Endoscopy;  Laterality: N/A;    ESOPHAGOGASTRODUODENOSCOPY N/A 6/29/2024    Procedure: EGD (ESOPHAGOGASTRODUODENOSCOPY);  Surgeon: Kaitlyn Pruitt MD;  Location: [facility];  Service: Endoscopy;  Laterality: N/A;    ESOPHAGOGASTRODUODENOSCOPY N/A 7/4/2024    Procedure: EGD (ESOPHAGOGASTRODUODENOSCOPY);  Surgeon: Niles Butler Jr., MD;  Location: [facility];  Service: Endoscopy;  Laterality: N/A;    INJECTION OF ANESTHETIC AGENT AROUND MEDIAL BRANCH NERVES INNERVATING LUMBAR FACET JOINT Left 05/16/2023    Procedure: Block-nerve-medial branch-lumbar;  Surgeon: Syed Betancourt MD;  Location: [facility];  Service: Pain Management;  Laterality: Left;  L3,4,5    INTRALUMINAL GASTROINTESTINAL TRACT IMAGING VIA CAPSULE N/A 7/25/2024    Procedure: IMAGING PROCEDURE, GI TRACT, INTRALUMINAL, VIA CAPSULE;  Surgeon: Kaitlyn Pruitt MD;  Location: [facility];  Service: Endoscopy;  Laterality: N/A;    NECK SURGERY      SMALL BOWEL  ENTEROSCOPY N/A 7/5/2024    Procedure: ENTEROSCOPY;  Surgeon: Kaitlyn Pruitt MD;  Location: [facility];  Service: Endoscopy;  Laterality: N/A;      Social History[1]    Current Medications[2]        Objective:      Vitals:    03/18/25 1529   BP: (!) 142/80   Patient Position: Sitting   Pulse: 73   Temp: 99 °F (37.2 °C)   SpO2: 96%   Weight: 89.8 kg (198 lb)      Physical Exam  Constitutional:       Appearance: Normal appearance.   HENT:      Head: Normocephalic and atraumatic.   Eyes:      Conjunctiva/sclera: Conjunctivae normal.   Cardiovascular:      Rate and Rhythm: Normal rate and regular rhythm.      Heart sounds: Normal heart sounds, S1 normal and S2 normal.   Pulmonary:      Effort: Pulmonary effort is normal. No respiratory distress.      Breath sounds: Normal breath sounds. No wheezing, rhonchi or rales.   Musculoskeletal:      Right lower leg: No edema.      Left lower leg: No edema.   Skin:     General: Skin is warm.   Neurological:      Mental Status: He is alert and oriented to person, place, and time.   Psychiatric:         Mood and Affect: Mood normal.         Behavior: Behavior normal.         Thought Content: Thought content normal.         Judgment: Judgment normal.         Assessment:       1. Major depressive disorder, single episode, moderate    2. Anxiety    3. Insomnia, unspecified type    4. Leg cramping    5. Positive depression screening        Plan:       Jerod was seen today for depression and back pain.    Diagnoses and all orders for this visit:    Major depressive disorder, single episode, moderate  -     venlafaxine (EFFEXOR-XR) 150 MG Cp24; Take 1 capsule (150 mg total) by mouth once daily.    Anxiety  -     venlafaxine (EFFEXOR-XR) 150 MG Cp24; Take 1 capsule (150 mg total) by mouth once daily.    Insomnia, unspecified type  -     zolpidem (AMBIEN) 5 MG Tab; Take 1 tablet (5 mg total) by mouth every evening.    Leg cramping  -     gabapentin (NEURONTIN) 100 MG capsule; Take 1  capsule (100 mg total) by mouth 3 (three) times daily.    Positive depression screening  Comments:  I have reviewed the positive depression score which warrants active treatment with psychotherapy and/or medications.           I have reviewed the positive depression score which warrants active treatment with psychotherapy and/or medications. Increased effexor.     F/u 1 month.          [1]   Social History  Socioeconomic History    Marital status:     Number of children: 2   [2]   Current Outpatient Medications:     amLODIPine (NORVASC) 10 MG tablet, TAKE 1 TABLET BY MOUTH EVERY DAY, Disp: 90 tablet, Rfl: 1    atorvastatin (LIPITOR) 20 MG tablet, Take 1 tablet (20 mg total) by mouth once daily., Disp: 90 tablet, Rfl: 3    multivitamin with minerals tablet, Take 1 tablet by mouth., Disp: , Rfl:     ondansetron (ZOFRAN-ODT) 4 MG TbDL, Take 1 tablet (4 mg total) by mouth every 6 (six) hours as needed (nausea)., Disp: 20 tablet, Rfl: 2    oxyCODONE-acetaminophen (PERCOCET) 7.5-325 mg per tablet, Take 1 tablet by mouth 3 (three) times daily., Disp: , Rfl:     pantoprazole (PROTONIX) 40 MG tablet, Take 1 tablet (40 mg total) by mouth 2 (two) times daily. for 10 days, Disp: 20 tablet, Rfl: 0    ALPRAZolam (XANAX) 0.5 MG tablet, Take 1 tablet (0.5 mg total) by mouth nightly as needed for Anxiety., Disp: 30 tablet, Rfl: 1    gabapentin (NEURONTIN) 100 MG capsule, Take 1 capsule (100 mg total) by mouth 3 (three) times daily., Disp: 90 capsule, Rfl: 11    venlafaxine (EFFEXOR-XR) 150 MG Cp24, Take 1 capsule (150 mg total) by mouth once daily., Disp: 90 capsule, Rfl: 3    zolpidem (AMBIEN) 5 MG Tab, Take 1 tablet (5 mg total) by mouth every evening., Disp: 30 tablet, Rfl: 2

## 2025-04-06 DIAGNOSIS — I10 ESSENTIAL HYPERTENSION: ICD-10-CM

## 2025-04-06 RX ORDER — AMLODIPINE BESYLATE 10 MG/1
10 TABLET ORAL
Qty: 90 TABLET | Refills: 0 | Status: SHIPPED | OUTPATIENT
Start: 2025-04-06

## 2025-04-06 NOTE — TELEPHONE ENCOUNTER
No care due was identified.  Health [facility] Embedded Care Due Messages. Reference number: [id number].   4/06/2025 12:21:14 AM CDT

## 2025-04-08 ENCOUNTER — HOSPITAL ENCOUNTER (EMERGENCY)
Facility: HOSPITAL | Age: 60
Discharge: PSYCHIATRIC OR SUBSTANCE USE FACILITY | End: 2025-04-08
Attending: EMERGENCY MEDICINE
Payer: COMMERCIAL

## 2025-04-08 VITALS
BODY MASS INDEX: 27.09 KG/M2 | WEIGHT: 200 LBS | HEIGHT: 72 IN | OXYGEN SATURATION: 99 % | DIASTOLIC BLOOD PRESSURE: 80 MMHG | RESPIRATION RATE: 19 BRPM | SYSTOLIC BLOOD PRESSURE: 139 MMHG | TEMPERATURE: 98 F | HEART RATE: 72 BPM

## 2025-04-08 DIAGNOSIS — R45.851 SUICIDAL IDEATION: Primary | ICD-10-CM

## 2025-04-08 LAB
ABSOLUTE EOSINOPHIL (SMH): 0.1 K/UL
ABSOLUTE MONOCYTE (SMH): 0.53 K/UL (ref 0.3–1)
ABSOLUTE NEUTROPHIL COUNT (SMH): 4.8 K/UL (ref 1.8–7.7)
ALBUMIN SERPL-MCNC: 4.4 G/DL (ref 3.5–5.2)
ALP SERPL-CCNC: 99 UNIT/L (ref 55–135)
ALT SERPL-CCNC: 15 UNIT/L (ref 10–44)
AMPHET UR QL SCN: NEGATIVE
ANION GAP (SMH): 8 MMOL/L (ref 8–16)
APAP SERPL-MCNC: 2 UG/ML (ref 10–20)
AST SERPL-CCNC: 14 UNIT/L (ref 10–40)
BARBITURATE SCN PRESENT UR: NEGATIVE
BASOPHILS # BLD AUTO: 0.02 K/UL
BASOPHILS NFR BLD AUTO: 0.3 %
BENZODIAZ UR QL SCN: NEGATIVE
BILIRUB SERPL-MCNC: 0.3 MG/DL (ref 0.1–1)
BILIRUB UR QL STRIP.AUTO: NEGATIVE
BUN SERPL-MCNC: 11 MG/DL (ref 6–20)
CALCIUM SERPL-MCNC: 9.3 MG/DL (ref 8.7–10.5)
CANNABINOIDS UR QL SCN: NEGATIVE
CHLORIDE SERPL-SCNC: 102 MMOL/L (ref 95–110)
CLARITY UR: CLEAR
CO2 SERPL-SCNC: 26 MMOL/L (ref 23–29)
COCAINE UR QL SCN: NEGATIVE
COLOR UR AUTO: YELLOW
CREAT SERPL-MCNC: 0.7 MG/DL (ref 0.5–1.4)
CREAT UR-MCNC: 32.9 MG/DL (ref 23–375)
ERYTHROCYTE [DISTWIDTH] IN BLOOD BY AUTOMATED COUNT: 12.5 % (ref 11.5–14.5)
ETHANOL SERPL-MCNC: <10 MG/DL
GFR SERPLBLD CREATININE-BSD FMLA CKD-EPI: >60 ML/MIN/1.73/M2
GLUCOSE SERPL-MCNC: 91 MG/DL (ref 70–110)
GLUCOSE UR QL STRIP: NEGATIVE
HCT VFR BLD AUTO: 42.9 % (ref 40–54)
HGB BLD-MCNC: 14.5 GM/DL (ref 14–18)
HGB UR QL STRIP: NEGATIVE
IMM GRANULOCYTES # BLD AUTO: 0.07 K/UL (ref 0–0.04)
IMM GRANULOCYTES NFR BLD AUTO: 1 % (ref 0–0.5)
KETONES UR QL STRIP: NEGATIVE
LEUKOCYTE ESTERASE UR QL STRIP: NEGATIVE
LYMPHOCYTES # BLD AUTO: 1.27 K/UL (ref 1–4.8)
MCH RBC QN AUTO: 31.2 PG (ref 27–31)
MCHC RBC AUTO-ENTMCNC: 33.8 G/DL (ref 32–36)
MCV RBC AUTO: 92 FL (ref 82–98)
NITRITE UR QL STRIP: NEGATIVE
NUCLEATED RBC (/100WBC) (SMH): 0 /100 WBC
OPIATES UR QL SCN: NEGATIVE
PCP UR QL: NEGATIVE
PH UR STRIP: 7 [PH]
PLATELET # BLD AUTO: 233 K/UL (ref 150–450)
PMV BLD AUTO: 9.4 FL (ref 9.2–12.9)
POTASSIUM SERPL-SCNC: 4.1 MMOL/L (ref 3.5–5.1)
PROT SERPL-MCNC: 7.5 GM/DL (ref 6–8.4)
PROT UR QL STRIP: NEGATIVE
RBC # BLD AUTO: 4.65 M/UL (ref 4.6–6.2)
RELATIVE EOSINOPHIL (SMH): 1.5 % (ref 0–8)
RELATIVE LYMPHOCYTE (SMH): 18.8 % (ref 18–48)
RELATIVE MONOCYTE (SMH): 7.9 % (ref 4–15)
RELATIVE NEUTROPHIL (SMH): 70.5 % (ref 38–73)
SALICYLATES SERPL-MCNC: <1.5 MG/DL (ref 15–30)
SODIUM SERPL-SCNC: 136 MMOL/L (ref 136–145)
SP GR UR STRIP: 1
UROBILINOGEN UR STRIP-ACNC: NEGATIVE EU/DL
WBC # BLD AUTO: 6.74 K/UL (ref 3.9–12.7)

## 2025-04-08 PROCEDURE — 80179 DRUG ASSAY SALICYLATE: CPT | Performed by: EMERGENCY MEDICINE

## 2025-04-08 PROCEDURE — 85025 COMPLETE CBC W/AUTO DIFF WBC: CPT | Performed by: EMERGENCY MEDICINE

## 2025-04-08 PROCEDURE — 36415 COLL VENOUS BLD VENIPUNCTURE: CPT | Performed by: EMERGENCY MEDICINE

## 2025-04-08 PROCEDURE — 99285 EMERGENCY DEPT VISIT HI MDM: CPT

## 2025-04-08 PROCEDURE — 81003 URINALYSIS AUTO W/O SCOPE: CPT | Performed by: EMERGENCY MEDICINE

## 2025-04-08 PROCEDURE — 80143 DRUG ASSAY ACETAMINOPHEN: CPT | Performed by: EMERGENCY MEDICINE

## 2025-04-08 PROCEDURE — 80307 DRUG TEST PRSMV CHEM ANLYZR: CPT | Performed by: EMERGENCY MEDICINE

## 2025-04-08 PROCEDURE — 82077 ASSAY SPEC XCP UR&BREATH IA: CPT | Performed by: EMERGENCY MEDICINE

## 2025-04-08 PROCEDURE — 86803 HEPATITIS C AB TEST: CPT | Performed by: PSYCHIATRY & NEUROLOGY

## 2025-04-08 PROCEDURE — 82040 ASSAY OF SERUM ALBUMIN: CPT | Performed by: EMERGENCY MEDICINE

## 2025-04-08 PROCEDURE — 87389 HIV-1 AG W/HIV-1&-2 AB AG IA: CPT | Performed by: PSYCHIATRY & NEUROLOGY

## 2025-04-08 NOTE — ED NOTES
Pt request friend Haily called. [phone]. she was called & informed pt wish to speak with her. He asked Hortensia not be told anything of his care.

## 2025-04-08 NOTE — ED NOTES
Pt to er with STSO c/o suicidal. Pt states he verbalized SI due to marital problems. No plan. He is calm, cooperative at assessment. Ruperto. Speech clear. Skin w/dr/pk. Rr even/unlab. Aaox4.   1.87

## 2025-04-08 NOTE — ED PROVIDER NOTES
Encounter Date: 4/8/2025       History     Chief Complaint   Patient presents with    Mental Health Problem     Pt arrived with police, after argument with family pt stated to police he wanted to kill himself. Denies plan     Patient with a history of depression.  Pain only there was some kind of argument with family members today.  Police were called.  During interview with police patient made suicidal statement.  He admits to me increasing depression with suicidal thoughts.  No plan.  Police reported to me patient has attempted suicide in the past with unclear details.  No delusions or hallucinations.      Review of patient's allergies indicates:   Allergen Reactions    Opioids - morphine analogues Other (See Comments)     Shaking      Past Medical History:   Diagnosis Date    Hypertension     Sleep apnea      Past Surgical History:   Procedure Laterality Date    COLONOSCOPY N/A 04/24/2023    Procedure: COLONOSCOPY;  Surgeon: Yunior Major MD;  Location: [facility];  Service: Endoscopy;  Laterality: N/A;  lm/ppm    ESOPHAGOGASTRODUODENOSCOPY  2012    ESOPHAGOGASTRODUODENOSCOPY N/A 7/6/2023    Procedure: EGD (ESOPHAGOGASTRODUODENOSCOPY);  Surgeon: Yunior Major MD;  Location: [facility];  Service: Endoscopy;  Laterality: N/A;    ESOPHAGOGASTRODUODENOSCOPY N/A 6/29/2024    Procedure: EGD (ESOPHAGOGASTRODUODENOSCOPY);  Surgeon: Kaitlyn Pruitt MD;  Location: [facility];  Service: Endoscopy;  Laterality: N/A;    ESOPHAGOGASTRODUODENOSCOPY N/A 7/4/2024    Procedure: EGD (ESOPHAGOGASTRODUODENOSCOPY);  Surgeon: Niles Butler Jr., MD;  Location: [facility];  Service: Endoscopy;  Laterality: N/A;    INJECTION OF ANESTHETIC AGENT AROUND MEDIAL BRANCH NERVES INNERVATING LUMBAR FACET JOINT Left 05/16/2023    Procedure: Block-nerve-medial branch-lumbar;  Surgeon: Syed Betancourt MD;  Location: [facility];  Service: Pain Management;  Laterality: Left;  L3,4,5    INTRALUMINAL GASTROINTESTINAL TRACT IMAGING VIA CAPSULE N/A  7/25/2024    Procedure: IMAGING PROCEDURE, GI TRACT, INTRALUMINAL, VIA CAPSULE;  Surgeon: Kaitlyn Pruitt MD;  Location: [facility];  Service: Endoscopy;  Laterality: N/A;    NECK SURGERY      SMALL BOWEL ENTEROSCOPY N/A 7/5/2024    Procedure: ENTEROSCOPY;  Surgeon: Kaitlyn Pruitt MD;  Location: [facility];  Service: Endoscopy;  Laterality: N/A;     Family History   Problem Relation Name Age of Onset    Cancer Mother          lung cancer    Alcohol abuse Father      Cancer Maternal Grandfather          Throat cancer     Social History[1]  Review of Systems   Constitutional:  Negative for chills and fever.   HENT:  Negative for sore throat.    Eyes:  Negative for photophobia and visual disturbance.   Respiratory:  Negative for shortness of breath.    Cardiovascular:  Negative for chest pain.   Gastrointestinal:  Negative for abdominal pain and vomiting.   Genitourinary:  Negative for dysuria.   Musculoskeletal:  Negative for joint swelling.   Skin:  Negative for rash.   Neurological:  Negative for weakness and headaches.   Psychiatric/Behavioral:  Negative for confusion.        Physical Exam     Initial Vitals [04/08/25 1200]   BP Pulse Resp Temp SpO2   (!) 154/82 76 18 98.4 °F (36.9 °C) 99 %      MAP       --         Physical Exam    Nursing note and vitals reviewed.  Constitutional: He is not diaphoretic. No distress.   HENT:   Head: Normocephalic and atraumatic.   Eyes: Conjunctivae are normal.   Neck:   Normal range of motion.  Cardiovascular:  Regular rhythm.           Pulmonary/Chest: Breath sounds normal.   Abdominal: Abdomen is soft. There is no abdominal tenderness.   Musculoskeletal:         General: Normal range of motion.      Cervical back: Normal range of motion.     Skin: No rash noted.   Psychiatric:   Calm and cooperative.  Flat affect.  Poor eye contact         ED Course   Procedures  Labs Reviewed   ACETAMINOPHEN LEVEL - Abnormal       Result Value    Acetaminophen Level 2.0 (*)     SALICYLATE LEVEL - Abnormal    Salicylate Level <1.5 (*)    CBC WITH DIFFERENTIAL - Abnormal    WBC 6.74      RBC 4.65      Hgb 14.5      Hct 42.9      MCV 92      MCH 31.2 (*)     MCHC 33.8      RDW 12.5      Platelet Count 233      MPV 9.4      Nucleated RBC 0      Neut % 70.5      Lymph % 18.8      Mono % 7.9      Eos % 1.5      Basophil % 0.3      Imm Grans % 1.0 (*)     Neut # 4.8      Lymph # 1.27      Mono # 0.53      Eos # 0.10      Baso # 0.02      Imm Grans # 0.07 (*)    COMPREHENSIVE METABOLIC PANEL - Normal    Sodium 136      Potassium 4.1      Chloride 102      CO2 26      Glucose 91      BUN 11      Creatinine 0.7      Calcium 9.3      Protein Total 7.5      Albumin 4.4      Bilirubin Total 0.3      ALP 99      AST 14      ALT 15      Anion Gap 8      eGFR >60     URINALYSIS, REFLEX TO URINE CULTURE - Normal    Color, UA Yellow      Appearance, UA Clear      Spec Grav UA 1.005      pH, UA 7.0      Protein, UA Negative      Glucose, UA Negative      Ketones, UA Negative      Blood, UA Negative      Bilirubin, UA Negative      Urobilinogen, UA Negative      Nitrites, UA Negative      Leukocyte Esterase, UA Negative     ALCOHOL,MEDICAL (ETHANOL) - Normal    Alcohol, Serum <10     CBC W/ AUTO DIFFERENTIAL    Narrative:     The following orders were created for panel order CBC auto differential.  Procedure                               Abnormality         Status                     ---------                               -----------         ------                     CBC with Differential[[id number]]       Abnormal            Final result                 Please view results for these tests on the individual orders.   DRUG SCREEN PANEL, URINE EMERGENCY   EXTRA TUBES    Narrative:     The following orders were created for panel order EXTRA TUBES.  Procedure                               Abnormality         Status                     ---------                               -----------         ------                      Light Blue Top Hold[[id number]]                             In process                 Gold Top Hold[[id number]]                                   In process                   Please view results for these tests on the individual orders.   LIGHT BLUE TOP HOLD   GOLD TOP HOLD   HEPATITIS C ANTIBODY   HIV 1 / 2 ANTIBODY          Imaging Results    None          Medications - No data to display  Medical Decision Making  Patient presents with suicidal ideation.  CBC CMP unremarkable.  Patient is medically clear for psychiatric transfer.  Patient is gravely disabled.  Physician emergency certificate completed.    Amount and/or Complexity of Data Reviewed  Labs: ordered. Decision-making details documented in ED Course.                  Medically cleared for psychiatry placement: 4/8/2025  2:03 PM                   Clinical Impression:  Final diagnoses:  [R45.851] Suicidal ideation (Primary)          ED Disposition Condition    Transfer to Psych Facility Stable          ED Prescriptions    None       Follow-up Information    None            Srinivas Adhikari MD  04/08/25 1405         [1]   Social History  Tobacco Use    Smoking status: Former     Types: Cigarettes    Smokeless tobacco: Never   Substance Use Topics    Alcohol use: Never    Drug use: Never        Srinivas Adhikari MD  04/08/25 6249

## 2025-04-09 PROBLEM — R41.840 POOR CONCENTRATION: Status: ACTIVE | Noted: 2025-04-09

## 2025-04-09 PROBLEM — Z87.19 HISTORY OF DUODENAL ULCER: Status: ACTIVE | Noted: 2025-04-09

## 2025-04-09 PROBLEM — G89.29 CHRONIC BACK PAIN: Status: ACTIVE | Noted: 2025-04-09

## 2025-04-09 PROBLEM — E78.5 HYPERLIPIDEMIA: Status: ACTIVE | Noted: 2025-03-25

## 2025-04-09 PROBLEM — M54.9 CHRONIC BACK PAIN: Status: ACTIVE | Noted: 2025-04-09

## 2025-04-09 PROBLEM — F43.21 ADJUSTMENT DISORDER WITH DEPRESSED MOOD: Status: ACTIVE | Noted: 2025-04-09

## 2025-04-09 LAB
HCV AB SERPL QL IA: NORMAL
HIV 1+2 AB+HIV1 P24 AG SERPL QL IA: NORMAL

## 2025-04-21 ENCOUNTER — OFFICE VISIT (OUTPATIENT)
Dept: FAMILY MEDICINE | Facility: CLINIC | Age: 60
End: 2025-04-21
Payer: COMMERCIAL

## 2025-04-21 VITALS
DIASTOLIC BLOOD PRESSURE: 72 MMHG | OXYGEN SATURATION: 98 % | WEIGHT: 192.44 LBS | TEMPERATURE: 98 F | BODY MASS INDEX: 26.07 KG/M2 | SYSTOLIC BLOOD PRESSURE: 128 MMHG | HEART RATE: 78 BPM | HEIGHT: 72 IN

## 2025-04-21 DIAGNOSIS — G47.00 INSOMNIA, UNSPECIFIED TYPE: ICD-10-CM

## 2025-04-21 DIAGNOSIS — G47.33 OSA (OBSTRUCTIVE SLEEP APNEA): ICD-10-CM

## 2025-04-21 DIAGNOSIS — F33.9 DEPRESSION, RECURRENT: ICD-10-CM

## 2025-04-21 DIAGNOSIS — F41.9 ANXIETY: ICD-10-CM

## 2025-04-21 DIAGNOSIS — M54.50 CHRONIC LEFT-SIDED LOW BACK PAIN WITHOUT SCIATICA: ICD-10-CM

## 2025-04-21 DIAGNOSIS — G89.29 OTHER CHRONIC PAIN: ICD-10-CM

## 2025-04-21 DIAGNOSIS — I10 HYPERTENSION, ESSENTIAL: Primary | ICD-10-CM

## 2025-04-21 DIAGNOSIS — G89.29 CHRONIC LEFT-SIDED LOW BACK PAIN WITHOUT SCIATICA: ICD-10-CM

## 2025-04-21 PROCEDURE — 3008F BODY MASS INDEX DOCD: CPT | Mod: CPTII,S$GLB,, | Performed by: FAMILY MEDICINE

## 2025-04-21 PROCEDURE — 1111F DSCHRG MED/CURRENT MED MERGE: CPT | Mod: CPTII,S$GLB,, | Performed by: FAMILY MEDICINE

## 2025-04-21 PROCEDURE — 1160F RVW MEDS BY RX/DR IN RCRD: CPT | Mod: CPTII,S$GLB,, | Performed by: FAMILY MEDICINE

## 2025-04-21 PROCEDURE — 3074F SYST BP LT 130 MM HG: CPT | Mod: CPTII,S$GLB,, | Performed by: FAMILY MEDICINE

## 2025-04-21 PROCEDURE — 3078F DIAST BP <80 MM HG: CPT | Mod: CPTII,S$GLB,, | Performed by: FAMILY MEDICINE

## 2025-04-21 PROCEDURE — 1159F MED LIST DOCD IN RCRD: CPT | Mod: CPTII,S$GLB,, | Performed by: FAMILY MEDICINE

## 2025-04-21 PROCEDURE — 99999 PR PBB SHADOW E&M-EST. PATIENT-LVL V: CPT | Mod: PBBFAC,,, | Performed by: FAMILY MEDICINE

## 2025-04-21 PROCEDURE — 99214 OFFICE O/P EST MOD 30 MIN: CPT | Mod: S$GLB,,, | Performed by: FAMILY MEDICINE

## 2025-04-21 RX ORDER — QUETIAPINE FUMARATE 50 MG/1
50 TABLET, FILM COATED ORAL NIGHTLY
COMMUNITY
End: 2025-04-21 | Stop reason: SDUPTHER

## 2025-04-21 RX ORDER — QUETIAPINE FUMARATE 50 MG/1
50 TABLET, FILM COATED ORAL NIGHTLY
Qty: 30 TABLET | Refills: 1 | Status: SHIPPED | OUTPATIENT
Start: 2025-04-21 | End: 2025-04-25 | Stop reason: SDUPTHER

## 2025-04-21 NOTE — PROGRESS NOTES
SCRIBE #1 NOTE: I, Shreyas Trejo, am scribing for, and in the presence of,  Sai Mccracken III, MD. I have scribed the entire note.     Subjective:       Patient ID: Jerod Barrientos is a 60 y.o. male.    Chief Complaint: Follow-up and Anxiety    Mr. Barrientos is here for a follow up and anxiety with his last appointment being here on March 18, 2025 with NP Aria. Reports he did smoke a few weeks ago, but it tasted terrible. No alcohol intake. States he had the flu 2x within a couple weeks of each other. BMI of 26.10. Cardiovascular good. No chest pain. No palpitations. Blood pressure is 128/72. Hypertension controlled. Anxiety. Depression. On Effexor 150mg. States it does not seem to be helping. Reports nothing in the past seemed to help. He did take Zoloft and Wellbutrin before. Reports these medications did not seem to be helping. He went to [facility] on April 8, 2025 due to depression. He was admitted for 3 days by his wife and son. No medication changes there. States he, his wife, and their son had an argument. States his wife and son make him feel as though he cannot do anything right. His mood can vary depending on how much his family gets onto him. Reports it hard to stay focused and motivated on what he needs to do. Reports he is supposed to do a walk in for psychiatry on [place], but he never did. Reports he is not suicidal. Insomnia. On Ambien 5mg nightly. Sleeping is difficult because he is supposed to take the medication on an empty stomach, but he never can. He goes to his shed and works on tables and chairs when he cannot sleep or needs a distraction. BERNARD. States hs wife told him he stops breathing 4-5x a night. His sleep study was 3 years ago. This showed AHI of 5.5/hr. He is not using the CPAP. Chronic left-sided low back pain. He is on chronic pain medication. He did have injections before. Sees Dr. Muse.  check done. B12 is 426.     Review of Systems   Constitutional:  Negative  for chills and fever.   HENT:  Negative for congestion and sore throat.    Eyes:  Negative for pain and visual disturbance.   Respiratory:  Negative for chest tightness and shortness of breath.    Cardiovascular:  Negative for chest pain.   Gastrointestinal:  Negative for nausea.   Endocrine: Negative for polydipsia and polyuria.   Genitourinary:  Negative for dysuria and flank pain.   Musculoskeletal:  Negative for back pain, neck pain and neck stiffness.   Skin:  Negative for rash.   Allergic/Immunologic: Negative for immunocompromised state.   Neurological:  Negative for dizziness and weakness.   Hematological:  Does not bruise/bleed easily.   Psychiatric/Behavioral:  Positive for decreased concentration, dysphoric mood and sleep disturbance. Negative for agitation, behavioral problems and suicidal ideas.    All other systems reviewed and are negative.      Objective:      Physical examination: Vital signs noted. No acute distress. No carotid bruit. Regular heart rate and rhythm. Lungs clear to auscultation bilaterally. Abdomen bowel sounds positive soft and nontender. Extremities without edema. 2+ pedal pulses. Mood is okay.       Assessment:       1. Hypertension, essential    2. Anxiety    3. Depression, recurrent    4. Insomnia, unspecified type    5. Chronic left-sided low back pain without sciatica    6. Other chronic pain    7. BERNARD (obstructive sleep apnea)        Plan:       Hypertension, essential    Anxiety  -     TSH; Future; Expected date: 04/21/2025  -     Ambulatory referral/consult to Psychiatry; Future; Expected date: 04/28/2025    Depression, recurrent  -     TSH; Future; Expected date: 04/21/2025  -     Ambulatory referral/consult to Psychiatry; Future; Expected date: 04/28/2025    Insomnia, unspecified type    Chronic left-sided low back pain without sciatica    Other chronic pain    BERNARD (obstructive sleep apnea)    Other orders  -     QUEtiapine (SEROQUEL) 50 MG tablet; Take 1 tablet (50 mg  total) by mouth nightly.  Dispense: 30 tablet; Refill: 1    Denies any suicidal intent.  To Psychiatry for follow-up.  Check TSH.  Seroquel 50 mg HS 30 with a refill.  Continue the Effexor  daily.  May want to consider Provigil.  But would prefer to leave this to Psychiatry.  Blood pressure well-controlled.  All care gaps addressed or discussed.     I, Sai Mccracken III, MD, personally performed the services described in this documentation. All medical record entries made by the scribe were at my direction and in my presence. I have reviewed the chart and agree that the record reflects my personal performance and is accurate and complete.

## 2025-04-22 PROBLEM — G47.00 INSOMNIA: Status: ACTIVE | Noted: 2025-04-22

## 2025-04-25 RX ORDER — QUETIAPINE FUMARATE 50 MG/1
50 TABLET, FILM COATED ORAL NIGHTLY
Qty: 30 TABLET | Refills: 1 | Status: SHIPPED | OUTPATIENT
Start: 2025-04-25

## 2025-04-25 NOTE — TELEPHONE ENCOUNTER
No care due was identified.  [facility] Embedded Care Due Messages. Reference number: [id number].   4/25/2025 2:02:44 PM CDT

## 2025-05-06 DIAGNOSIS — Z63.8 STRESS DUE TO FAMILY TENSION: ICD-10-CM

## 2025-05-06 RX ORDER — BUPROPION HYDROCHLORIDE 150 MG/1
TABLET ORAL
Qty: 30 TABLET | Refills: 2 | OUTPATIENT
Start: 2025-05-06

## 2025-05-06 SDOH — SOCIAL DETERMINANTS OF HEALTH (SDOH): OTHER SPECIFIED PROBLEMS RELATED TO PRIMARY SUPPORT GROUP: Z63.8

## 2025-05-06 NOTE — TELEPHONE ENCOUNTER
No care due was identified.  Health [facility] Embedded Care Due Messages. Reference number: [id number].   5/06/2025 10:56:07 AM CDT

## 2025-05-06 NOTE — TELEPHONE ENCOUNTER
Refill Decision Note   Jerod Barrientos  is requesting a refill authorization.  Brief Assessment and Rationale for Refill:  Quick Discontinue     Medication Therapy Plan: Discontinued by: Aria Lloyd FNP-C on 2/18/2025 15:47 Reason: Therapy not effective      Comments:     Note composed:12:48 PM 05/06/2025

## 2025-05-08 ENCOUNTER — LAB VISIT (OUTPATIENT)
Dept: LAB | Facility: HOSPITAL | Age: 60
End: 2025-05-08
Attending: FAMILY MEDICINE
Payer: COMMERCIAL

## 2025-05-08 DIAGNOSIS — F41.9 ANXIETY: ICD-10-CM

## 2025-05-08 DIAGNOSIS — F33.9 DEPRESSION, RECURRENT: ICD-10-CM

## 2025-05-08 LAB — TSH SERPL-ACNC: 1.42 UIU/ML (ref 0.34–5.6)

## 2025-05-08 PROCEDURE — 84443 ASSAY THYROID STIM HORMONE: CPT

## 2025-05-08 PROCEDURE — 36415 COLL VENOUS BLD VENIPUNCTURE: CPT

## 2025-05-11 ENCOUNTER — RESULTS FOLLOW-UP (OUTPATIENT)
Dept: FAMILY MEDICINE | Facility: CLINIC | Age: 60
End: 2025-05-11

## 2025-05-28 ENCOUNTER — PATIENT MESSAGE (OUTPATIENT)
Dept: FAMILY MEDICINE | Facility: CLINIC | Age: 60
End: 2025-05-28
Payer: COMMERCIAL

## 2025-06-06 ENCOUNTER — TELEPHONE (OUTPATIENT)
Dept: PSYCHIATRY | Facility: CLINIC | Age: 60
End: 2025-06-06
Payer: COMMERCIAL

## 2025-06-23 ENCOUNTER — OFFICE VISIT (OUTPATIENT)
Dept: FAMILY MEDICINE | Facility: CLINIC | Age: 60
End: 2025-06-23
Payer: COMMERCIAL

## 2025-06-23 VITALS
SYSTOLIC BLOOD PRESSURE: 126 MMHG | OXYGEN SATURATION: 93 % | HEART RATE: 58 BPM | TEMPERATURE: 99 F | DIASTOLIC BLOOD PRESSURE: 64 MMHG | HEIGHT: 72 IN | BODY MASS INDEX: 25.12 KG/M2 | WEIGHT: 185.5 LBS

## 2025-06-23 DIAGNOSIS — I10 HYPERTENSION, ESSENTIAL: Primary | ICD-10-CM

## 2025-06-23 DIAGNOSIS — F41.9 ANXIETY: ICD-10-CM

## 2025-06-23 DIAGNOSIS — F33.9 DEPRESSION, RECURRENT: ICD-10-CM

## 2025-06-23 DIAGNOSIS — K21.9 GASTROESOPHAGEAL REFLUX DISEASE, UNSPECIFIED WHETHER ESOPHAGITIS PRESENT: ICD-10-CM

## 2025-06-23 DIAGNOSIS — E53.8 VITAMIN B 12 DEFICIENCY: ICD-10-CM

## 2025-06-23 DIAGNOSIS — G47.00 INSOMNIA, UNSPECIFIED TYPE: ICD-10-CM

## 2025-06-23 DIAGNOSIS — E78.5 HYPERLIPIDEMIA, UNSPECIFIED HYPERLIPIDEMIA TYPE: ICD-10-CM

## 2025-06-23 PROCEDURE — 3074F SYST BP LT 130 MM HG: CPT | Mod: CPTII,S$GLB,, | Performed by: FAMILY MEDICINE

## 2025-06-23 PROCEDURE — 99214 OFFICE O/P EST MOD 30 MIN: CPT | Mod: S$GLB,,, | Performed by: FAMILY MEDICINE

## 2025-06-23 PROCEDURE — 1159F MED LIST DOCD IN RCRD: CPT | Mod: CPTII,S$GLB,, | Performed by: FAMILY MEDICINE

## 2025-06-23 PROCEDURE — 3008F BODY MASS INDEX DOCD: CPT | Mod: CPTII,S$GLB,, | Performed by: FAMILY MEDICINE

## 2025-06-23 PROCEDURE — 99999 PR PBB SHADOW E&M-EST. PATIENT-LVL IV: CPT | Mod: PBBFAC,,, | Performed by: FAMILY MEDICINE

## 2025-06-23 PROCEDURE — 3078F DIAST BP <80 MM HG: CPT | Mod: CPTII,S$GLB,, | Performed by: FAMILY MEDICINE

## 2025-06-23 PROCEDURE — 1160F RVW MEDS BY RX/DR IN RCRD: CPT | Mod: CPTII,S$GLB,, | Performed by: FAMILY MEDICINE

## 2025-06-23 RX ORDER — PANTOPRAZOLE SODIUM 40 MG/1
40 TABLET, DELAYED RELEASE ORAL DAILY
Qty: 90 TABLET | Refills: 1 | Status: SHIPPED | OUTPATIENT
Start: 2025-06-23

## 2025-06-23 RX ORDER — ARIPIPRAZOLE 5 MG/1
5 TABLET ORAL DAILY
COMMUNITY
End: 2025-06-23 | Stop reason: SDUPTHER

## 2025-06-23 RX ORDER — ARIPIPRAZOLE 5 MG/1
5 TABLET ORAL DAILY
Qty: 30 TABLET | Refills: 5 | Status: SHIPPED | OUTPATIENT
Start: 2025-06-23

## 2025-06-23 RX ORDER — ALPRAZOLAM 0.5 MG/1
0.5 TABLET ORAL NIGHTLY PRN
Qty: 30 TABLET | Refills: 0 | Status: SHIPPED | OUTPATIENT
Start: 2025-06-23 | End: 2025-07-23

## 2025-06-23 NOTE — PROGRESS NOTES
SCRIBE #1 NOTE: I, Shreyas Trejo, am scribing for, and in the presence of,  Sai Mccracken III, MD. I have scribed the entire note.     Subjective:       Patient ID: Jerod Benavides is a 60 y.o. male.    Chief Complaint: Follow-up (2 month)    Mr. Benavides is here for a follow up with his last appointment being here on April 21, 2025. BMI of 25.16. Cardiovascular good. No chest pain. No palpitations. Blood pressure is 126/64. Hypertension controlled. Hyperlipidemia. Depression. On Effexor. This is doing well. He got to have a sleep over with his granddaughter. States it will take 7-8 months to see psychiatry. 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 Anxiety. States he is not sure when he had Xanax. He last had it in August 2024. Reports he is stressed. He has to call  every other week due to people around the area. Insomnia. On Seroquel at night. Reports it leaves him feeling groggy around 10-11AM, but he takes it around 8:30PM. He will go to sleep right after taking the medication. He wakes up at 5AM at times. He is taking Ambien. Gastroesophageal reflux disease. States he has been taking Pantoprazole daily for a long time. Vitamin B12 deficiency. States he is using B12 drops under the tongue with sometimes 2x a day.  check done. TSH is 1.422.     Review of Systems   Constitutional:  Negative for chills and fever.   HENT:  Negative for congestion and sore throat.    Eyes:  Negative for pain and visual disturbance.   Respiratory:  Negative for chest tightness and shortness of breath.    Cardiovascular:  Negative for chest pain.   Gastrointestinal:  Negative for nausea.   Endocrine: Negative for polydipsia and polyuria.   Genitourinary:  Negative for dysuria and flank pain.   Musculoskeletal:  Negative for back pain, neck pain and neck stiffness.   Skin:  Negative for rash.   Allergic/Immunologic: Negative for immunocompromised state.   Neurological:  Negative for dizziness and weakness.   Hematological:  Does  not bruise/bleed easily.   Psychiatric/Behavioral:  Negative for agitation and behavioral problems.    All other systems reviewed and are negative.      Objective:      Physical examination: Vital signs noted. No acute distress. No carotid bruit. Regular heart rate and rhythm. Lungs clear to auscultation bilaterally. Abdomen bowel sounds positive soft and nontender. Extremities without edema. 2+ pedal pulses. Tongue is okay. Mood and affect is okay.       Assessment:       1. Hypertension, essential    2. Gastroesophageal reflux disease, unspecified whether esophagitis present    3. Hyperlipidemia, unspecified hyperlipidemia type    4. Anxiety    5. Depression, recurrent    6. BMI 25.0-25.9,adult    7. Insomnia, unspecified type    8. Vitamin B 12 deficiency        Plan:       Hypertension, essential    Gastroesophageal reflux disease, unspecified whether esophagitis present    Hyperlipidemia, unspecified hyperlipidemia type    Anxiety  -     ALPRAZolam (XANAX) 0.5 MG tablet; Take 1 tablet (0.5 mg total) by mouth nightly as needed for Anxiety.  Dispense: 30 tablet; Refill: 0    Depression, recurrent    BMI 25.0-25.9,adult    Insomnia, unspecified type    Vitamin B 12 deficiency  -     Vitamin B12; Future; Expected date: 06/23/2025    Other orders  -     ARIPiprazole (ABILIFY) 5 MG Tab; Take 1 tablet (5 mg total) by mouth once daily.  Dispense: 30 tablet; Refill: 5  -     pantoprazole (PROTONIX) 40 MG tablet; Take 1 tablet (40 mg total) by mouth once daily.  Dispense: 90 tablet; Refill: 1    Hypertension under good control.  Still good bit of anxiety.  Depression.  Not suicidal.  Refill Xanax 0.5 use this sparingly.  Discontinue the Seroquel.  Try Abilify 5 mg daily 30 with 5 refills.  Use Psychiatry as recommended previously.  Check B12 level.  Protonix 40 mg daily 90 with 1 refill.  All care gaps addressed or discussed.     ISai III, MD, personally performed the services described in this  documentation. All medical record entries made by the scribe were at my direction and in my presence. I have reviewed the chart and agree that the record reflects my personal performance and is accurate and complete.

## 2025-07-05 DIAGNOSIS — I10 ESSENTIAL HYPERTENSION: ICD-10-CM

## 2025-07-05 NOTE — TELEPHONE ENCOUNTER
No care due was identified.  [facility] Embedded Care Due Messages. Reference number: [id number].   7/05/2025 12:25:20 AM CDT

## 2025-07-06 RX ORDER — AMLODIPINE BESYLATE 10 MG/1
10 TABLET ORAL
Qty: 90 TABLET | Refills: 3 | Status: SHIPPED | OUTPATIENT
Start: 2025-07-06

## 2025-07-07 NOTE — TELEPHONE ENCOUNTER
Refill Decision Note   Jerod Floyd  is requesting a refill authorization.  Brief Assessment and Rationale for Refill:  Approve     Medication Therapy Plan:         Comments:     Note composed:8:48 PM 07/06/2025

## (undated) DEVICE — SYR DISP LL 5CC

## (undated) DEVICE — GLOVE SURG ULTRA TOUCH 7.5

## (undated) DEVICE — CHLORAPREP 10.5 ML APPLICATOR

## (undated) DEVICE — SKIN MARKER STER DUAL TIP

## (undated) DEVICE — NDL SPINAL 25GX3.5 SPINOCAN

## (undated) DEVICE — NDL BLUNT W/O FILTER 18GX1.5IN